# Patient Record
Sex: MALE | Race: WHITE | Employment: OTHER | URBAN - METROPOLITAN AREA
[De-identification: names, ages, dates, MRNs, and addresses within clinical notes are randomized per-mention and may not be internally consistent; named-entity substitution may affect disease eponyms.]

---

## 2017-04-03 ENCOUNTER — HOSPITAL ENCOUNTER (OUTPATIENT)
Dept: PREADMISSION TESTING | Age: 72
Discharge: HOME | End: 2017-04-03
Attending: UROLOGY | Admitting: UROLOGY

## 2017-04-03 VITALS
WEIGHT: 133 LBS | BODY MASS INDEX: 20.16 KG/M2 | OXYGEN SATURATION: 99 % | SYSTOLIC BLOOD PRESSURE: 178 MMHG | DIASTOLIC BLOOD PRESSURE: 84 MMHG | TEMPERATURE: 97.6 F | HEART RATE: 70 BPM | HEIGHT: 68 IN | RESPIRATION RATE: 16 BRPM

## 2017-04-03 LAB
ANION GAP SERPL CALCULATED.3IONS-SCNC: 18 MMOL/L (ref 4–16)
BUN BLDV-MCNC: 70 MG/DL (ref 6–23)
CALCIUM SERPL-MCNC: 9 MG/DL (ref 8.3–10.6)
CHLORIDE BLD-SCNC: 99 MMOL/L (ref 99–110)
CO2: 21 MMOL/L (ref 21–32)
CREAT SERPL-MCNC: 7.3 MG/DL (ref 0.9–1.3)
EKG ATRIAL RATE: 63 BPM
EKG DIAGNOSIS: NORMAL
EKG P AXIS: 68 DEGREES
EKG P-R INTERVAL: 164 MS
EKG Q-T INTERVAL: 458 MS
EKG QRS DURATION: 128 MS
EKG QTC CALCULATION (BAZETT): 468 MS
EKG R AXIS: -58 DEGREES
EKG T AXIS: 56 DEGREES
EKG VENTRICULAR RATE: 63 BPM
GFR AFRICAN AMERICAN: 9 ML/MIN/1.73M2
GFR NON-AFRICAN AMERICAN: 7 ML/MIN/1.73M2
GLUCOSE BLD-MCNC: 92 MG/DL (ref 70–140)
HCT VFR BLD CALC: 35.4 % (ref 42–52)
HEMOGLOBIN: 11.5 GM/DL (ref 13.5–18)
MCH RBC QN AUTO: 31.2 PG (ref 27–31)
MCHC RBC AUTO-ENTMCNC: 32.5 % (ref 32–36)
MCV RBC AUTO: 95.9 FL (ref 78–100)
PDW BLD-RTO: 13.4 % (ref 11.7–14.9)
PLATELET # BLD: 220 K/CU MM (ref 140–440)
PMV BLD AUTO: 9.4 FL (ref 7.5–11.1)
POTASSIUM SERPL-SCNC: 5.2 MMOL/L (ref 3.5–5.1)
RBC # BLD: 3.69 M/CU MM (ref 4.6–6.2)
SODIUM BLD-SCNC: 138 MMOL/L (ref 135–145)
WBC # BLD: 10.1 K/CU MM (ref 4–10.5)

## 2017-04-03 RX ORDER — FOLIC ACID/VIT B COMPLEX AND C 0.8 MG
1 TABLET ORAL DAILY
COMMUNITY

## 2017-04-03 RX ORDER — CIPROFLOXACIN 2 MG/ML
400 INJECTION, SOLUTION INTRAVENOUS ONCE
Status: CANCELLED | OUTPATIENT
Start: 2017-04-13

## 2017-04-03 ASSESSMENT — PAIN SCALES - GENERAL: PAINLEVEL_OUTOF10: 0

## 2017-04-07 LAB
CULTURE: NORMAL
ORGANISM: NORMAL
ORGANISM: NORMAL
REPORT STATUS: NORMAL
REQUEST PROBLEM: NORMAL
SPECIMEN: NORMAL
TOTAL COLONY COUNT: NORMAL

## 2017-04-13 ENCOUNTER — HOSPITAL ENCOUNTER (OUTPATIENT)
Dept: SURGERY | Age: 72
Discharge: HOME | End: 2017-05-12
Attending: UROLOGY | Admitting: UROLOGY

## 2017-04-24 VITALS — BODY MASS INDEX: 20.16 KG/M2 | WEIGHT: 133 LBS | HEIGHT: 68 IN

## 2017-04-24 RX ORDER — CIPROFLOXACIN 2 MG/ML
400 INJECTION, SOLUTION INTRAVENOUS ONCE
Status: CANCELLED | OUTPATIENT
Start: 2017-04-25

## 2017-04-25 ENCOUNTER — HOSPITAL ENCOUNTER (OUTPATIENT)
Dept: SURGERY | Age: 72
Discharge: HOME | End: 2017-05-24
Attending: UROLOGY | Admitting: UROLOGY

## 2017-05-22 ENCOUNTER — HOSPITAL ENCOUNTER (OUTPATIENT)
Dept: OTHER | Age: 72
Discharge: HOME | End: 2017-05-22
Attending: ANESTHESIOLOGY | Admitting: ANESTHESIOLOGY

## 2017-05-22 LAB
ANION GAP SERPL CALCULATED.3IONS-SCNC: 16 MMOL/L (ref 4–16)
BUN BLDV-MCNC: 13 MG/DL (ref 6–23)
CALCIUM SERPL-MCNC: 8.4 MG/DL (ref 8.3–10.6)
CHLORIDE BLD-SCNC: 92 MMOL/L (ref 99–110)
CO2: 28 MMOL/L (ref 21–32)
CREAT SERPL-MCNC: 1.7 MG/DL (ref 0.9–1.3)
GFR AFRICAN AMERICAN: 49 ML/MIN/1.73M2
GFR NON-AFRICAN AMERICAN: 40 ML/MIN/1.73M2
GLUCOSE BLD-MCNC: 162 MG/DL (ref 70–140)
HEMOGLOBIN: 10.6 GM/DL (ref 13.5–18)
POTASSIUM SERPL-SCNC: 2.9 MMOL/L (ref 3.5–5.1)
SODIUM BLD-SCNC: 136 MMOL/L (ref 135–145)

## 2017-10-09 ENCOUNTER — HOSPITAL ENCOUNTER (OUTPATIENT)
Dept: OTHER | Age: 72
Discharge: HOME | End: 2017-10-09
Attending: UROLOGY | Admitting: UROLOGY

## 2017-10-09 LAB — PSA ULTRASENSITIVE: 2.33 NG/ML (ref 0–4)

## 2018-09-14 ENCOUNTER — HOSPITAL ENCOUNTER (OUTPATIENT)
Dept: OTHER | Age: 73
Discharge: HOME | End: 2018-09-14
Attending: UROLOGY | Admitting: UROLOGY

## 2018-09-14 LAB — PROSTATE SPECIFIC ANTIGEN: 6.16 NG/ML (ref 0–4)

## 2019-02-05 ENCOUNTER — HOSPITAL ENCOUNTER (OUTPATIENT)
Dept: GENERAL RADIOLOGY | Age: 74
Discharge: HOME | End: 2019-02-05
Payer: MEDICARE

## 2019-02-05 DIAGNOSIS — R10.9 STOMACH ACHE: ICD-10-CM

## 2019-02-05 DIAGNOSIS — K57.31 DIVERTICULAR HEMORRHAGE: ICD-10-CM

## 2019-02-05 PROCEDURE — 74280 X-RAY XM COLON 2CNTRST STD: CPT

## 2019-02-15 ENCOUNTER — HOSPITAL ENCOUNTER (OUTPATIENT)
Age: 74
Setting detail: SPECIMEN
Discharge: HOME | End: 2019-02-15
Payer: MEDICARE

## 2019-02-15 PROCEDURE — 84153 ASSAY OF PSA TOTAL: CPT

## 2019-02-15 PROCEDURE — 84154 ASSAY OF PSA FREE: CPT

## 2019-02-18 LAB
PROSTATE SPECIFIC ANTIGEN FREE: 2.2
PROSTATE SPECIFIC ANTIGEN PERCENT FREE: 34
PROSTATE SPECIFIC ANTIGEN: 6.5

## 2019-05-01 ENCOUNTER — HOSPITAL ENCOUNTER (OUTPATIENT)
Age: 74
Setting detail: SPECIMEN
Discharge: HOME | End: 2019-05-01
Payer: MEDICARE

## 2019-05-01 LAB
ANION GAP SERPL CALCULATED.3IONS-SCNC: 15 MMOL/L (ref 4–16)
BUN BLDV-MCNC: 12 MG/DL (ref 6–23)
CALCIUM SERPL-MCNC: 8.4 MG/DL (ref 8.3–10.6)
CHLORIDE BLD-SCNC: 94 MMOL/L (ref 99–110)
CO2: 28 MMOL/L (ref 21–32)
CREAT SERPL-MCNC: 1.7 MG/DL (ref 0.9–1.3)
GFR AFRICAN AMERICAN: 48 ML/MIN/1.73M2
GFR NON-AFRICAN AMERICAN: 40 ML/MIN/1.73M2
GLUCOSE BLD-MCNC: 48 MG/DL (ref 70–99)
HEMOGLOBIN: 12.3 GM/DL (ref 13.5–18)
POTASSIUM SERPL-SCNC: 3.6 MMOL/L (ref 3.5–5.1)
SODIUM BLD-SCNC: 137 MMOL/L (ref 135–145)

## 2019-05-01 PROCEDURE — 85018 HEMOGLOBIN: CPT

## 2019-05-01 PROCEDURE — 80048 BASIC METABOLIC PNL TOTAL CA: CPT

## 2019-05-15 ENCOUNTER — HOSPITAL ENCOUNTER (OUTPATIENT)
Age: 74
Setting detail: SPECIMEN
Discharge: HOME | End: 2019-05-15

## 2019-05-15 LAB
ANION GAP SERPL CALCULATED.3IONS-SCNC: 13 MMOL/L (ref 4–16)
BUN BLDV-MCNC: 9 MG/DL (ref 6–23)
CALCIUM SERPL-MCNC: 8.2 MG/DL (ref 8.3–10.6)
CHLORIDE BLD-SCNC: 97 MMOL/L (ref 99–110)
CO2: 29 MMOL/L (ref 21–32)
CREAT SERPL-MCNC: 1.7 MG/DL (ref 0.9–1.3)
GFR AFRICAN AMERICAN: 48 ML/MIN/1.73M2
GFR NON-AFRICAN AMERICAN: 40 ML/MIN/1.73M2
GLUCOSE BLD-MCNC: 80 MG/DL (ref 70–99)
HEMOGLOBIN: 11.3 GM/DL (ref 13.5–18)
POTASSIUM SERPL-SCNC: 3.8 MMOL/L (ref 3.5–5.1)
SODIUM BLD-SCNC: 139 MMOL/L (ref 135–145)

## 2019-05-15 PROCEDURE — 85018 HEMOGLOBIN: CPT

## 2019-05-15 PROCEDURE — 80048 BASIC METABOLIC PNL TOTAL CA: CPT

## 2019-06-05 ENCOUNTER — HOSPITAL ENCOUNTER (OUTPATIENT)
Age: 74
Setting detail: SPECIMEN
Discharge: HOME | End: 2019-06-05
Payer: MEDICARE

## 2019-06-05 LAB
ANION GAP SERPL CALCULATED.3IONS-SCNC: 10 MMOL/L (ref 4–16)
BUN BLDV-MCNC: 9 MG/DL (ref 6–23)
CALCIUM SERPL-MCNC: 8.3 MG/DL (ref 8.3–10.6)
CHLORIDE BLD-SCNC: 99 MMOL/L (ref 99–110)
CO2: 31 MMOL/L (ref 21–32)
CREAT SERPL-MCNC: 1.6 MG/DL (ref 0.9–1.3)
GFR AFRICAN AMERICAN: 52 ML/MIN/1.73M2
GFR NON-AFRICAN AMERICAN: 43 ML/MIN/1.73M2
GLUCOSE BLD-MCNC: 68 MG/DL (ref 70–99)
HEMOGLOBIN: 11.8 GM/DL (ref 13.5–18)
POTASSIUM SERPL-SCNC: 4.3 MMOL/L (ref 3.5–5.1)
SODIUM BLD-SCNC: 140 MMOL/L (ref 135–145)

## 2019-06-05 PROCEDURE — 85018 HEMOGLOBIN: CPT

## 2019-06-05 PROCEDURE — 80048 BASIC METABOLIC PNL TOTAL CA: CPT

## 2019-07-03 ENCOUNTER — OFFICE VISIT (OUTPATIENT)
Dept: INFECTIOUS DISEASES | Age: 74
End: 2019-07-03
Payer: MEDICARE

## 2019-07-03 VITALS
BODY MASS INDEX: 21.01 KG/M2 | DIASTOLIC BLOOD PRESSURE: 67 MMHG | SYSTOLIC BLOOD PRESSURE: 137 MMHG | TEMPERATURE: 98.3 F | WEIGHT: 138.2 LBS | RESPIRATION RATE: 14 BRPM | HEART RATE: 79 BPM

## 2019-07-03 DIAGNOSIS — B96.29 URINARY TRACT INFECTION DUE TO EXTENDED-SPECTRUM BETA LACTAMASE (ESBL) PRODUCING ESCHERICHIA COLI: ICD-10-CM

## 2019-07-03 DIAGNOSIS — N39.0 URINARY TRACT INFECTION DUE TO EXTENDED-SPECTRUM BETA LACTAMASE (ESBL) PRODUCING ESCHERICHIA COLI: ICD-10-CM

## 2019-07-03 DIAGNOSIS — Z99.2 HEMODIALYSIS ACCESS SITE WITH MATURE FISTULA (HCC): ICD-10-CM

## 2019-07-03 DIAGNOSIS — Z16.12 URINARY TRACT INFECTION DUE TO EXTENDED-SPECTRUM BETA LACTAMASE (ESBL) PRODUCING ESCHERICHIA COLI: ICD-10-CM

## 2019-07-03 PROBLEM — N18.6 ESRD ON HEMODIALYSIS (HCC): Status: ACTIVE | Noted: 2019-07-03

## 2019-07-03 PROCEDURE — 99205 OFFICE O/P NEW HI 60 MIN: CPT | Performed by: INTERNAL MEDICINE

## 2019-07-03 RX ORDER — GRANULES FOR ORAL 3 G/1
3 POWDER ORAL
Qty: 3 EACH | Refills: 0 | Status: SHIPPED | OUTPATIENT
Start: 2019-07-03 | End: 2019-07-10

## 2019-07-03 RX ORDER — GRANULES FOR ORAL 3 G/1
3 POWDER ORAL
Qty: 3 EACH | Refills: 0 | Status: SHIPPED | OUTPATIENT
Start: 2019-07-03 | End: 2019-07-03

## 2019-07-07 PROBLEM — Z99.2 ESRD ON HEMODIALYSIS (HCC): Status: RESOLVED | Noted: 2019-07-03 | Resolved: 2019-07-07

## 2019-07-07 PROBLEM — N18.6 ESRD ON HEMODIALYSIS (HCC): Status: RESOLVED | Noted: 2019-07-03 | Resolved: 2019-07-07

## 2019-07-07 PROBLEM — Z99.2 HEMODIALYSIS ACCESS SITE WITH MATURE FISTULA (HCC): Status: ACTIVE | Noted: 2019-07-07

## 2019-07-07 ASSESSMENT — ENCOUNTER SYMPTOMS
ALLERGIC/IMMUNOLOGIC NEGATIVE: 1
RESPIRATORY NEGATIVE: 1
GASTROINTESTINAL NEGATIVE: 1
EYES NEGATIVE: 1

## 2019-07-07 NOTE — PROGRESS NOTES
soft without significant tenderness, masses, organomegaly or guarding. EXT:left upper arm AV fistula peripheral pulses normal, no pedal edema, no clubbing or cyanosis  NEURO: alert, oriented, normal speech, no focal findings or movement disorder noted  Skin: well hydrated, no lesions, surgical site examined  Wounds: none  Labs:   WBC   Date Value Ref Range Status   08/31/2017 9.1 4.0 - 10.5 K/CU MM Final   04/03/2017 10.1 4.0 - 10.5 K/CU MM Final   02/14/2017 8.6 4.0 - 10.5 K/CU MM Final     CREATININE   Date Value Ref Range Status   06/05/2019 1.6 (H) 0.9 - 1.3 MG/DL Final   05/15/2019 1.7 (H) 0.9 - 1.3 MG/DL Final   05/01/2019 1.7 (H) 0.9 - 1.3 MG/DL Final       Cultures:  Culture   Date Value Ref Range Status   04/03/2017 PROVIDENCIA (PROTEUS) RETTGERI HEAVY GROWTH  Final   04/03/2017       ESCHERICHIA COLI (ESBL) HEAVY GROWTH, RESIST DUE TO EXTENDED SPECTRUM BETA-LACTAMASE (ESBL), THIS ISOLATE DEMONSTRATES MULTI DRUG RESISTANCE TO AT LEAST 3 DRUG CLASSES.   04/03/2017 (NOTE)     04/03/2017       CALLED TO  OFFICE-[facility] ON 04/07/2017 @1039 BY   04/03/2017 D. RA MLT         Imaging Studies:   none      Electronicallysigned by Zahida Patterson MD on 7/7/19 at 7:31 PM

## 2019-07-12 ENCOUNTER — TELEPHONE (OUTPATIENT)
Dept: INFECTIOUS DISEASES | Age: 74
End: 2019-07-12

## 2019-07-12 DIAGNOSIS — N39.0 URINARY TRACT INFECTION DUE TO EXTENDED-SPECTRUM BETA LACTAMASE (ESBL) PRODUCING ESCHERICHIA COLI: Primary | ICD-10-CM

## 2019-07-12 DIAGNOSIS — B96.29 URINARY TRACT INFECTION DUE TO EXTENDED-SPECTRUM BETA LACTAMASE (ESBL) PRODUCING ESCHERICHIA COLI: Primary | ICD-10-CM

## 2019-07-12 DIAGNOSIS — Z16.12 URINARY TRACT INFECTION DUE TO EXTENDED-SPECTRUM BETA LACTAMASE (ESBL) PRODUCING ESCHERICHIA COLI: Primary | ICD-10-CM

## 2019-07-12 NOTE — TELEPHONE ENCOUNTER
He could use Augmentin orally for 2 weeks if this fails then he will need a tunneled PICC line for IV ertapenem once a day for 2 weeks. I put in a prescription. Unclear if [facility] preferred [facility]. Could you find out?

## 2019-07-16 RX ORDER — AMOXICILLIN AND CLAVULANATE POTASSIUM 875; 125 MG/1; MG/1
1 TABLET, FILM COATED ORAL 2 TIMES DAILY
Qty: 28 TABLET | Refills: 0 | Status: SHIPPED | OUTPATIENT
Start: 2019-07-16 | End: 2019-07-30

## 2019-08-07 ENCOUNTER — OFFICE VISIT (OUTPATIENT)
Dept: INFECTIOUS DISEASES | Age: 74
End: 2019-08-07
Payer: MEDICARE

## 2019-08-07 VITALS
OXYGEN SATURATION: 99 % | BODY MASS INDEX: 20.83 KG/M2 | HEART RATE: 71 BPM | DIASTOLIC BLOOD PRESSURE: 70 MMHG | SYSTOLIC BLOOD PRESSURE: 128 MMHG | WEIGHT: 137 LBS

## 2019-08-07 DIAGNOSIS — B96.29 URINARY TRACT INFECTION DUE TO EXTENDED-SPECTRUM BETA LACTAMASE (ESBL) PRODUCING ESCHERICHIA COLI: Primary | ICD-10-CM

## 2019-08-07 DIAGNOSIS — N39.0 URINARY TRACT INFECTION DUE TO EXTENDED-SPECTRUM BETA LACTAMASE (ESBL) PRODUCING ESCHERICHIA COLI: Primary | ICD-10-CM

## 2019-08-07 DIAGNOSIS — Z16.12 URINARY TRACT INFECTION DUE TO EXTENDED-SPECTRUM BETA LACTAMASE (ESBL) PRODUCING ESCHERICHIA COLI: Primary | ICD-10-CM

## 2019-08-07 DIAGNOSIS — N18.4 CHRONIC KIDNEY DISEASE (CKD), STAGE IV (SEVERE) (HCC): ICD-10-CM

## 2019-08-07 PROCEDURE — 99214 OFFICE O/P EST MOD 30 MIN: CPT | Performed by: INTERNAL MEDICINE

## 2019-08-13 ASSESSMENT — ENCOUNTER SYMPTOMS
RESPIRATORY NEGATIVE: 1
EYES NEGATIVE: 1
GASTROINTESTINAL NEGATIVE: 1
ALLERGIC/IMMUNOLOGIC NEGATIVE: 1

## 2019-08-13 NOTE — PROGRESS NOTES
Last attempt to quit: 4/3/1992     Years since quittin.3    Smokeless tobacco: Never Used   Substance and Sexual Activity    Alcohol use: No     Comment: quit 2.5yrs ago--used to drink 3-4 beers daily    Drug use: No    Sexual activity: Not on file   Lifestyle    Physical activity:     Days per week: Not on file     Minutes per session: Not on file    Stress: Not on file   Relationships    Social connections:     Talks on phone: Not on file     Gets together: Not on file     Attends [employer] service: Not on file     Active member of club or organization: Not on file     Attends meetings of clubs or organizations: Not on file     Relationship status: Not on file    Intimate partner violence:     Fear of current or ex partner: Not on file     Emotionally abused: Not on file     Physically abused: Not on file     Forced sexual activity: Not on file   Other Topics Concern    Not on file   Social History Narrative    Not on file       Family History   Problem Relation Age of Onset    Diabetes Mother     Diabetes Brother     Heart Disease Brother        Vital Signs:  Vitals:    19 1429   BP: 128/70   Site: Right Upper Arm   Position: Sitting   Cuff Size: Large Adult   Pulse: 71   SpO2: 99%   Weight: 137 lb (62.1 kg)        Wt Readings from Last 3 Encounters:   19 137 lb (62.1 kg)   19 138 lb 3.2 oz (62.7 kg)   17 133 lb (60.3 kg)        Physical Exam:   Gen: alert and NAD  HEENT: sclera clear, pupils equal and reactive, extra ocular muscles intact, oropharynx clear, mucus membranes moist, tympanic membranes clear bilaterally, no cervical lymphadenopathy noted and neck supple  Neck: supple, no significant adenopathy  Chest: clear to auscultation, no wheezes, rales or rhonchi, symmetric air entry  Heart: regular rate and rhythm, no murmurs  ABD: abdomen is soft without significant tenderness, masses, organomegaly or guarding.   EXT:left upper arm AV fistula peripheral pulses normal,

## 2019-08-19 ENCOUNTER — TELEPHONE (OUTPATIENT)
Dept: INFECTIOUS DISEASES | Age: 74
End: 2019-08-19

## 2019-08-21 ENCOUNTER — OFFICE VISIT (OUTPATIENT)
Dept: INFECTIOUS DISEASES | Age: 74
End: 2019-08-21
Payer: MEDICARE

## 2019-08-21 VITALS
OXYGEN SATURATION: 99 % | DIASTOLIC BLOOD PRESSURE: 68 MMHG | HEART RATE: 76 BPM | WEIGHT: 136 LBS | BODY MASS INDEX: 20.68 KG/M2 | SYSTOLIC BLOOD PRESSURE: 140 MMHG

## 2019-08-21 DIAGNOSIS — B96.29 URINARY TRACT INFECTION DUE TO EXTENDED-SPECTRUM BETA LACTAMASE (ESBL) PRODUCING ESCHERICHIA COLI: Primary | ICD-10-CM

## 2019-08-21 DIAGNOSIS — Z16.12 URINARY TRACT INFECTION DUE TO EXTENDED-SPECTRUM BETA LACTAMASE (ESBL) PRODUCING ESCHERICHIA COLI: Primary | ICD-10-CM

## 2019-08-21 DIAGNOSIS — Z79.2 RECEIVING INTRAVENOUS ANTIBIOTIC TREATMENT AS OUTPATIENT: ICD-10-CM

## 2019-08-21 DIAGNOSIS — Z22.39 ESBL E. COLI CARRIER: ICD-10-CM

## 2019-08-21 DIAGNOSIS — N39.0 URINARY TRACT INFECTION DUE TO EXTENDED-SPECTRUM BETA LACTAMASE (ESBL) PRODUCING ESCHERICHIA COLI: Primary | ICD-10-CM

## 2019-08-21 PROCEDURE — 99214 OFFICE O/P EST MOD 30 MIN: CPT | Performed by: INTERNAL MEDICINE

## 2019-08-21 RX ORDER — NITROFURANTOIN 25; 75 MG/1; MG/1
CAPSULE ORAL
Refills: 0 | COMMUNITY
Start: 2019-08-09 | End: 2021-06-17

## 2019-08-21 ASSESSMENT — ENCOUNTER SYMPTOMS
GASTROINTESTINAL NEGATIVE: 1
EYES NEGATIVE: 1
RESPIRATORY NEGATIVE: 1
ALLERGIC/IMMUNOLOGIC NEGATIVE: 1

## 2019-08-21 NOTE — PROGRESS NOTES
8/21/2019         Referring Physician: No ref. provider found  Primary Care Physician: Austin Martinez MD    Impression/Plan:   Diagnosis Orders   1. Urinary tract infection due to extended-spectrum beta lactamase (ESBL) producing Escherichia coli  ertapenem (INVANZ) infusion   2. ESBL E. coli carrier  ertapenem (INVANZ) infusion    URINALYSIS WITH MICROSCOPIC    URINE CULTURE   3. Receiving intravenous antibiotic treatment as outpatient  ertapenem (INVANZ) infusion    CBC Auto Differential    COMPREHENSIVE METABOLIC PANEL       Discussion:  Urinary tract infection due to extended-spectrum beta lactamase (ESBL) producing Escherichia coli  Ertapenem for 10 days. UA and urine culture at the completion of treatment   See in 2 weeks      No follow-ups on file. History: Jay López is a 72 y.o.  male with CKD 4 on HD presenting today for follow-up asymptomatic ESBL E coli bacteriuria. He is being planned for TURP and the urology service would like this urine to test negative prior to the procedure. Cost of fosfomycin was prohibitive hence he was treated with coamoxiclav. He denies dysuria, abdominal pain, fever, chills, or flank pain. Repeat urine culture remains positive for ESBL E coli. Review of Systems   Constitutional: Negative. HENT: Negative. Eyes: Negative. Respiratory: Negative. Cardiovascular: Negative. Gastrointestinal: Negative. Endocrine: Negative. Musculoskeletal: Negative. Skin: Negative. Allergic/Immunologic: Negative. Neurological: Negative. Hematological: Negative. Psychiatric/Behavioral: Negative.         No Known Allergies    Patient Active Problem List   Diagnosis    Chronic kidney disease (CKD), stage IV (severe) ([facility] [address]    Right inguinal hernia    Urinary tract infection due to extended-spectrum beta lactamase (ESBL) producing Escherichia coli    Hemodialysis access site with mature fistula [facility])       Current Outpatient Medications Medication Sig Dispense Refill    nitrofurantoin, macrocrystal-monohydrate, (MACROBID) 100 MG capsule TK 1 C PO Q 12 H WF  0    ertapenem (INVANZ) infusion Infuse 500 mg intravenously every 24 hours for 10 days Compound per protocol. 5000 mg 0    B Complex-C-Folic Acid (SHEA-ABHISHEK) TABS Take 1 tablet by mouth daily      RENVELA 800 MG tablet 2 Tabs  PO TID WITH MEALS  2    carvedilol (COREG) 3.125 MG tablet Take 3.125 mg by mouth daily   0    finasteride (PROSCAR) 5 MG tablet Take 5 mg by mouth daily   0    nitroGLYCERIN (NITRODUR) 0.2 MG/HR Place 1 patch onto the skin every other day   0    isosorbide mononitrate (IMDUR) 30 MG extended release tablet 30 mg daily   0     No current facility-administered medications for this visit.         Past Medical History:   Diagnosis Date    Abnormal urine     abn urine culture 4/3/2017- OR for 4/13/2017 cancelled-rescheduled for 4/25/2017- ( on 4/24/2017 pt states finishes antibiotic today and had repeat urine culture at office last week)    Chronic kidney disease, stage 4, severely decreased GFR ([facility] [address] 09/2016    Dialysis on Mon-Wed-Fri    follows with Dr Dottie Pro Nunez catheter in place     Has had in place since 9/29/16    Hearing deficit     both ears --no hearing aids    Hemodialysis patient [facility])     Sees Dr Chen Setting    Dialysis on Mon-Wed-Fri    History of blood transfusion     Hypertension     Inguinal hernia     Missing teeth, acquired     upper and lower  some broken off now    Wears glasses     for reading       Past Surgical History:   Procedure Laterality Date    DIALYSIS FISTULA CREATION Left 11/29/2016    Left upper arm    HERNIA REPAIR Right 11/29/2016    INGUINAL HERNIA    PROSTATE SURGERY  04/2017       Social History     Socioeconomic History    Marital status: Single     Spouse name: Not on file    Number of children: Not on file    Years of education: Not on file    Highest education level: Not on file   Occupational History    Not

## 2019-08-21 NOTE — ASSESSMENT & PLAN NOTE
Ertapenem for 10 days.  UA and urine culture at the completion of treatment   See in 2 weeks   Discussed with Dr. Jaye Wilburn, doesn't want PICC rather wants IJ CVC

## 2019-08-26 ENCOUNTER — TELEPHONE (OUTPATIENT)
Dept: INFECTIOUS DISEASES | Age: 74
End: 2019-08-26

## 2019-08-26 DIAGNOSIS — Z79.2 RECEIVING INTRAVENOUS ANTIBIOTIC TREATMENT AT HOME: Primary | ICD-10-CM

## 2019-09-05 ENCOUNTER — HOSPITAL ENCOUNTER (OUTPATIENT)
Age: 74
Setting detail: SPECIMEN
Discharge: HOME | End: 2019-09-05
Payer: MEDICARE

## 2019-09-05 LAB
ALBUMIN SERPL-MCNC: 4.2 GM/DL (ref 3.4–5)
ALP BLD-CCNC: 102 IU/L (ref 40–128)
ALT SERPL-CCNC: 10 U/L (ref 10–40)
ANION GAP SERPL CALCULATED.3IONS-SCNC: 14 MMOL/L (ref 4–16)
AST SERPL-CCNC: 17 IU/L (ref 15–37)
BACTERIA: NEGATIVE /HPF
BASOPHILS ABSOLUTE: 0.1 K/CU MM
BASOPHILS RELATIVE PERCENT: 0.9 % (ref 0–1)
BILIRUB SERPL-MCNC: 0.3 MG/DL (ref 0–1)
BILIRUBIN URINE: NEGATIVE MG/DL
BLOOD, URINE: NEGATIVE
BUN BLDV-MCNC: 32 MG/DL (ref 6–23)
CALCIUM SERPL-MCNC: 9.8 MG/DL (ref 8.3–10.6)
CHLORIDE BLD-SCNC: 95 MMOL/L (ref 99–110)
CLARITY: CLEAR
CO2: 31 MMOL/L (ref 21–32)
COLOR: YELLOW
CREAT SERPL-MCNC: 5 MG/DL (ref 0.9–1.3)
DIFFERENTIAL TYPE: ABNORMAL
EOSINOPHILS ABSOLUTE: 0.3 K/CU MM
EOSINOPHILS RELATIVE PERCENT: 3.1 % (ref 0–3)
GFR AFRICAN AMERICAN: 14 ML/MIN/1.73M2
GFR NON-AFRICAN AMERICAN: 11 ML/MIN/1.73M2
GLUCOSE BLD-MCNC: 98 MG/DL (ref 70–99)
GLUCOSE, URINE: NEGATIVE MG/DL
HCT VFR BLD CALC: 37 % (ref 42–52)
HEMOGLOBIN: 12.1 GM/DL (ref 13.5–18)
IMMATURE NEUTROPHIL %: 0.3 % (ref 0–0.43)
KETONES, URINE: NEGATIVE MG/DL
LEUKOCYTE ESTERASE, URINE: NEGATIVE
LYMPHOCYTES ABSOLUTE: 2.3 K/CU MM
LYMPHOCYTES RELATIVE PERCENT: 26.9 % (ref 24–44)
MCH RBC QN AUTO: 30.3 PG (ref 27–31)
MCHC RBC AUTO-ENTMCNC: 32.7 % (ref 32–36)
MCV RBC AUTO: 92.5 FL (ref 78–100)
MONOCYTES ABSOLUTE: 0.6 K/CU MM
MONOCYTES RELATIVE PERCENT: 7.2 % (ref 0–4)
NITRITE URINE, QUANTITATIVE: NEGATIVE
NUCLEATED RBC %: 0 %
PDW BLD-RTO: 13.2 % (ref 11.7–14.9)
PH, URINE: 9 (ref 5–8)
PLATELET # BLD: 240 K/CU MM (ref 140–440)
PMV BLD AUTO: 10.2 FL (ref 7.5–11.1)
POTASSIUM SERPL-SCNC: 5.1 MMOL/L (ref 3.5–5.1)
PROTEIN UA: 100 MG/DL
RBC # BLD: 4 M/CU MM (ref 4.6–6.2)
RBC URINE: <1 /HPF (ref 0–3)
SEGMENTED NEUTROPHILS ABSOLUTE COUNT: 5.3 K/CU MM
SEGMENTED NEUTROPHILS RELATIVE PERCENT: 61.6 % (ref 36–66)
SODIUM BLD-SCNC: 140 MMOL/L (ref 135–145)
SPECIFIC GRAVITY UA: 1.01 (ref 1–1.03)
SQUAMOUS EPITHELIAL: 1 /HPF
TOTAL IMMATURE NEUTOROPHIL: 0.03 K/CU MM
TOTAL NUCLEATED RBC: 0 K/CU MM
TOTAL PROTEIN: 6.8 GM/DL (ref 6.4–8.2)
TRICHOMONAS: ABNORMAL /HPF
UROBILINOGEN, URINE: NORMAL MG/DL (ref 0.2–1)
WBC # BLD: 8.7 K/CU MM (ref 4–10.5)
WBC UA: 1 /HPF (ref 0–2)

## 2019-09-05 PROCEDURE — 80053 COMPREHEN METABOLIC PANEL: CPT

## 2019-09-05 PROCEDURE — 81001 URINALYSIS AUTO W/SCOPE: CPT

## 2019-09-05 PROCEDURE — 85025 COMPLETE CBC W/AUTO DIFF WBC: CPT

## 2019-09-10 ENCOUNTER — HOSPITAL ENCOUNTER (OUTPATIENT)
Dept: INTERVENTIONAL RADIOLOGY/VASCULAR | Age: 74
Discharge: HOME | End: 2019-09-10

## 2019-09-11 ENCOUNTER — OFFICE VISIT (OUTPATIENT)
Dept: INFECTIOUS DISEASES | Age: 74
End: 2019-09-11
Payer: MEDICARE

## 2019-09-11 VITALS
HEART RATE: 73 BPM | DIASTOLIC BLOOD PRESSURE: 66 MMHG | TEMPERATURE: 98.3 F | SYSTOLIC BLOOD PRESSURE: 152 MMHG | BODY MASS INDEX: 20.92 KG/M2 | RESPIRATION RATE: 12 BRPM | WEIGHT: 137.6 LBS

## 2019-09-11 DIAGNOSIS — B96.29 URINARY TRACT INFECTION DUE TO EXTENDED-SPECTRUM BETA LACTAMASE (ESBL) PRODUCING ESCHERICHIA COLI: ICD-10-CM

## 2019-09-11 DIAGNOSIS — N39.0 URINARY TRACT INFECTION DUE TO EXTENDED-SPECTRUM BETA LACTAMASE (ESBL) PRODUCING ESCHERICHIA COLI: ICD-10-CM

## 2019-09-11 DIAGNOSIS — Z22.39 ESBL E. COLI CARRIER: Primary | ICD-10-CM

## 2019-09-11 DIAGNOSIS — Z16.12 URINARY TRACT INFECTION DUE TO EXTENDED-SPECTRUM BETA LACTAMASE (ESBL) PRODUCING ESCHERICHIA COLI: ICD-10-CM

## 2019-09-11 PROCEDURE — 1036F TOBACCO NON-USER: CPT | Performed by: INTERNAL MEDICINE

## 2019-09-11 PROCEDURE — 1123F ACP DISCUSS/DSCN MKR DOCD: CPT | Performed by: INTERNAL MEDICINE

## 2019-09-11 PROCEDURE — G8427 DOCREV CUR MEDS BY ELIG CLIN: HCPCS | Performed by: INTERNAL MEDICINE

## 2019-09-11 PROCEDURE — 99214 OFFICE O/P EST MOD 30 MIN: CPT | Performed by: INTERNAL MEDICINE

## 2019-09-11 PROCEDURE — G8420 CALC BMI NORM PARAMETERS: HCPCS | Performed by: INTERNAL MEDICINE

## 2019-09-11 PROCEDURE — 3017F COLORECTAL CA SCREEN DOC REV: CPT | Performed by: INTERNAL MEDICINE

## 2019-09-11 PROCEDURE — 4040F PNEUMOC VAC/ADMIN/RCVD: CPT | Performed by: INTERNAL MEDICINE

## 2019-09-11 ASSESSMENT — ENCOUNTER SYMPTOMS
GASTROINTESTINAL NEGATIVE: 1
ALLERGIC/IMMUNOLOGIC NEGATIVE: 1
RESPIRATORY NEGATIVE: 1
EYES NEGATIVE: 1

## 2019-09-11 NOTE — PROGRESS NOTES
 carvedilol (COREG) 3.125 MG tablet Take 3.125 mg by mouth daily   0    finasteride (PROSCAR) 5 MG tablet Take 5 mg by mouth daily   0    nitroGLYCERIN (NITRODUR) 0.2 MG/HR Place 1 patch onto the skin every other day   0    isosorbide mononitrate (IMDUR) 30 MG extended release tablet 30 mg daily   0     No current facility-administered medications for this visit.         Past Medical History:   Diagnosis Date    Abnormal urine     abn urine culture 4/3/2017- OR for 2017 cancelled-rescheduled for 2017- ( on 2017 pt states finishes antibiotic today and had repeat urine culture at office last week)    Chronic kidney disease, stage 4, severely decreased GFR ([facility] [address] 2016    Dialysis on Mon-    follows with Dr Georgina Navarro Nunez catheter in place     Has had in place since 16    Hearing deficit     both ears --no hearing aids    Hemodialysis patient [facility])     Sees Dr Jaime Stewart    Dialysis on     History of blood transfusion     Hypertension     Inguinal hernia     Missing teeth, acquired     upper and lower  some broken off now    Wears glasses     for reading       Past Surgical History:   Procedure Laterality Date    DIALYSIS FISTULA CREATION Left 2016    Left upper arm    HERNIA REPAIR Right 2016    INGUINAL HERNIA    PROSTATE SURGERY  2017       Social History     Socioeconomic History    Marital status: Single     Spouse name: Not on file    Number of children: Not on file    Years of education: Not on file    Highest education level: Not on file   Occupational History    Not on file   Social Needs    Financial resource strain: Not on file    Food insecurity:     Worry: Not on file     Inability: Not on file    Transportation needs:     Medical: Not on file     Non-medical: Not on file   Tobacco Use    Smoking status: Former Smoker     Years: 30.00     Types: Cigarettes     Last attempt to quit: 4/3/1992     Years since quittin.4   

## 2021-05-27 NOTE — PROGRESS NOTES
Patient Name:  Puma Fowler  Patient :  1945  Patient MRN:  [id number]     Primary Oncologist: Trenton Morel MD  Referring Provider: Caesar Fine MD     Date of Service: 2021      Reason for Consult:  Melanoma     Chief Complaint:    Chief Complaint   Patient presents with    New Patient      Patient Active Problem List:     Chronic kidney disease (CKD), stage IV (severe)      Right inguinal hernia     Urinary tract infection due to extended-spectrum beta lactamase (ESBL) producing Escherichia coli     Hemodialysis access site with mature fistula      HPI:   Davida Park is a pleasant 73-year-old male patient who was referred for evaluation of melanoma. He went to Dr Rebecca Ortega for evaluation of mole to right back and had biopsy on May 19, 2021       He has red hair. He was referred to Dr Mikael Diego for wide excision and sentinel lymph node biopsy. We went over NCCN guideline. He has pathological stage IIA. We discussed about observation vs clinical trial. I will discuss with clinical research nurse. I discussed about surveillance. No routine labs or imaging study were recommended. I recommend to follow up with me after surgery. We discussed about using of sunscreen. He has ESRD and is on HD TIW.     Past Medical History:  Past Medical History:   Diagnosis Date    Abnormal urine     abn urine culture 4/3/2017- OR for 2017 cancelled-rescheduled for 2017- ( on 2017 pt states finishes antibiotic today and had repeat urine culture at office last week)    Chronic kidney disease, stage 4, severely decreased GFR ([address] 2016    Dialysis on Mon-Wed-Fri    follows with Dr Issa Flores Nunez catheter in place     Has had in place since 16    Hearing deficit     both ears --no hearing aids    Hemodialysis patient [facility])     Sees Dr Mu Matos    Dialysis on Mon-Wed-Fri    History of blood transfusion     Hypertension     Inguinal hernia     Missing teeth, acquired     upper and lower some broken off now    Wears glasses     for reading      Past Surgery History:     DIALYSIS FISTULA CREATION 11/29/2016 Left upper arm   HERNIA REPAIR Right 11/29/2016 INGUINAL HERNIA   PROSTATE SURGERY 04/2017 for BPH  Colonoscopy in 2019. Social History:  He smoked 2 PPD for 19 years and quit in 1995. Denied EtOH or illicit drug use. He has 3 children. Family History  No cancer in the family. No Known Allergies    Current Outpatient Medications on File Prior to Visit   Medication Sig Dispense Refill    tamsulosin (FLOMAX) 0.4 MG capsule TAKE 1 CAPSULE BY MOUTH EVERY DAY      polyethylene glycol (GLYCOLAX) 17 GM/SCOOP powder MIX 1 CAPFUL IN 8 OZ OF LIQUID AND DRINK BY MOUTH ONCE DAILY AS DIRECTED      pantoprazole (PROTONIX) 40 MG tablet TAKE 1 TABLET BY MOUTH EVERY DAY      carvedilol (COREG) 3.125 MG tablet Take 1 tablet by mouth daily 180 tablet 3    nitrofurantoin, macrocrystal-monohydrate, (MACROBID) 100 MG capsule TK 1 C PO Q 12 H WF  0    B Complex-C-Folic Acid (SHEA-ABHISHEK) TABS Take 1 tablet by mouth daily      RENVELA 800 MG tablet 2 Tabs  PO TID WITH MEALS  2    finasteride (PROSCAR) 5 MG tablet Take 5 mg by mouth daily   0    nitroGLYCERIN (NITRODUR) 0.2 MG/HR Place 1 patch onto the skin every other day   0    isosorbide mononitrate (IMDUR) 30 MG extended release tablet 30 mg daily   0     No current facility-administered medications on file prior to visit. Review of Systems:    Constitutional:  No weight loss, No fever, No chills, No night sweats. Energy level good. Eyes:  No diplopia, No transient or permanent loss of vision, No scotomata. ENT / Mouth:  No epistaxis, No dysphagia, No hoarseness, No oral ulcers, No gingival bleeding. No sore throat, No postnasal drip, No nasal drip, No mouth pain, No sinus pain, No tinnitus, Normal hearing.   Cardiovascular:  No chest pain, No palpitations, No syncope, No upper extremity edema, No lower extremity edema, No calf discomfort. Respiratory:  No cough. No hemoptysis, No pleurisy, No wheezing, No dyspnea. Gastrointestinal:  No abdominal pain, No abdominal cramping, No nausea, No vomiting, No constipation, No diarrhea, No hematochezia, No melena, No jaundice, No dyspepsia, No dysphagia. Urinary:  No dysuria, No hematuria, No urinary incontinence. Musculoskeletal:  No muscle pain, No swollen joints, No joint redness, No bone pain, No spine tenderness. Skin:  No rash, No nodules, No pruritus, s/p biopsy of skin of right back. Neurologic:  No confusion, No seizures, No syncope, No tremor, No speech change, No headache, No hiccups, No abnormal gait, No sensory changes, No weakness. Psychiatric:  No depression, No anxiety, Concentration normal.  Endocrine:  No polyuria, No polydipsia, No hot flashes, No thyroid symptoms. Hematologic:  No epistaxis, No gingival bleeding, No petechiae, No ecchymosis. Lymphatic:  No lymphadenopathy, No lymphedema. Allergy / Immunologic:  No eczema, No frequent mucous infections, No frequent respiratory infections, No recurrent urticarial, No frequent skin infections.      Vital Signs: BP (!) 152/73 (Site: Right Upper Arm, Position: Sitting, Cuff Size: Large Adult)   Pulse 72   Temp 97.1 °F (36.2 °C) (Infrared)   Resp 16   Ht 5' 8\" (1.727 m)   Wt 140 lb 3.2 oz (63.6 kg)   SpO2 97%   BMI 21.32 kg/m²      Physical Exam:  CONSTITUTIONAL: awake, alert, cooperative, no apparent distress   EYES: pupils equal, round and reactive to light, sclera clear and conjunctiva normal  ENT: Normocephalic, without obvious abnormality, atraumatic  NECK: supple, symmetrical, no jugular venous distension and no carotid bruits   HEMATOLOGIC/LYMPHATIC: no cervical, supraclavicular or axillary lymphadenopathy   LUNGS: no increased work of breathing and clear to auscultation   CARDIOVASCULAR: regular rate and rhythm, normal S1 and S2, no murmur noted  ABDOMEN: normal bowel sounds x 4, soft, non-distended, non-tender, no masses palpated, no hepatosplenomegaly   MUSCULOSKELETAL: full range of motion noted, tone is normal  NEUROLOGIC: awake, alert, oriented to name, place and time. Motor skills grossly intact. SKIN: No jaundice. Healing scar from recent biopsy to right back  EXTREMITIES: no LE edema     Labs:  Hematology:  Lab Results   Component Value Date    WBC 8.7 09/05/2019    RBC 4.00 (L) 09/05/2019    HGB 12.1 (L) 09/05/2019    HCT 37.0 (L) 09/05/2019    MCV 92.5 09/05/2019    MCH 30.3 09/05/2019    MCHC 32.7 09/05/2019    RDW 13.2 09/05/2019     09/05/2019    MPV 10.2 09/05/2019    SEGSPCT 61.6 09/05/2019    EOSRELPCT 3.1 (H) 09/05/2019    BASOPCT 0.9 09/05/2019    LYMPHOPCT 26.9 09/05/2019    MONOPCT 7.2 (H) 09/05/2019    SEGSABS 5.3 09/05/2019    EOSABS 0.3 09/05/2019    BASOSABS 0.1 09/05/2019    LYMPHSABS 2.3 09/05/2019    MONOSABS 0.6 09/05/2019    DIFFTYPE AUTOMATED DIFFERENTIAL 09/05/2019     Chemistry:  Lab Results   Component Value Date     09/05/2019    K 5.1 09/05/2019    CL 95 (L) 09/05/2019    CO2 31 09/05/2019    BUN 32 (H) 09/05/2019    CREATININE 5.0 (H) 09/05/2019    GLUCOSE 98 09/05/2019    CALCIUM 9.8 09/05/2019    PROT 6.8 09/05/2019    LABALBU 4.2 09/05/2019    BILITOT 0.3 09/05/2019    ALKPHOS 102 09/05/2019    AST 17 09/05/2019    ALT 10 09/05/2019    LABGLOM 11 (L) 09/05/2019    GFRAA 14 (L) 09/05/2019     Immunology:  Lab Results   Component Value Date    PROT 6.8 09/05/2019     Coagulation Panel:  Lab Results   Component Value Date    PROTIME 10.8 08/31/2017    INR 0.95 08/31/2017    APTT 29.5 08/31/2017    DDIMER 223 04/15/2011     Tumor Markers:  Lab Results   Component Value Date    PSA 6.5 (H) 02/15/2019        Observations:  PHQ-9 Total Score: 0 (6/4/2021  1:53 PM)       Assessment & Plan:  1. 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 He is scheduled for wide excision and sentinel lymph node sampling.   We discussed about

## 2021-06-04 ENCOUNTER — HOSPITAL ENCOUNTER (OUTPATIENT)
Dept: INFUSION THERAPY | Age: 76
Discharge: HOME | End: 2021-06-04
Payer: MEDICARE

## 2021-06-04 ENCOUNTER — INITIAL CONSULT (OUTPATIENT)
Dept: ONCOLOGY | Age: 76
End: 2021-06-04
Payer: MEDICARE

## 2021-06-04 VITALS
HEART RATE: 72 BPM | OXYGEN SATURATION: 97 % | BODY MASS INDEX: 21.25 KG/M2 | RESPIRATION RATE: 16 BRPM | HEIGHT: 68 IN | TEMPERATURE: 97.1 F | DIASTOLIC BLOOD PRESSURE: 73 MMHG | WEIGHT: 140.2 LBS | SYSTOLIC BLOOD PRESSURE: 152 MMHG

## 2021-06-04 DIAGNOSIS — C43.59 MALIGNANT MELANOMA OF TORSO EXCLUDING BREAST (HCC): Primary | ICD-10-CM

## 2021-06-04 PROCEDURE — 99205 OFFICE O/P NEW HI 60 MIN: CPT | Performed by: INTERNAL MEDICINE

## 2021-06-04 PROCEDURE — 1123F ACP DISCUSS/DSCN MKR DOCD: CPT | Performed by: INTERNAL MEDICINE

## 2021-06-04 PROCEDURE — 99202 OFFICE O/P NEW SF 15 MIN: CPT

## 2021-06-04 PROCEDURE — 1036F TOBACCO NON-USER: CPT | Performed by: INTERNAL MEDICINE

## 2021-06-04 PROCEDURE — 3017F COLORECTAL CA SCREEN DOC REV: CPT | Performed by: INTERNAL MEDICINE

## 2021-06-04 PROCEDURE — 4040F PNEUMOC VAC/ADMIN/RCVD: CPT | Performed by: INTERNAL MEDICINE

## 2021-06-04 PROCEDURE — G8420 CALC BMI NORM PARAMETERS: HCPCS | Performed by: INTERNAL MEDICINE

## 2021-06-04 PROCEDURE — G8427 DOCREV CUR MEDS BY ELIG CLIN: HCPCS | Performed by: INTERNAL MEDICINE

## 2021-06-04 ASSESSMENT — PATIENT HEALTH QUESTIONNAIRE - PHQ9
SUM OF ALL RESPONSES TO PHQ QUESTIONS 1-9: 0
2. FEELING DOWN, DEPRESSED OR HOPELESS: 0
1. LITTLE INTEREST OR PLEASURE IN DOING THINGS: 0
SUM OF ALL RESPONSES TO PHQ9 QUESTIONS 1 & 2: 0

## 2021-06-04 NOTE — PROGRESS NOTES
MA Rooming Questions  Patient: Serafin Stewart  MRN: [id number]    Date: 6/4/2021        New patient        PHQ-9 Total Score: 0 (6/4/2021  1:53 PM)       PHQ-9 Given to (if applicable):               PHQ-9 Score (if applicable):                     [] Positive     []  Negative              Does question #9 need addressed (if applicable)                     [] Yes    []  No               Shirin Chimera, CMA

## 2021-06-06 NOTE — PROGRESS NOTES
Patient Name:  José Orellana  Patient :  1945  Patient MRN:  [id number]     Primary Oncologist: Sonam Villanueva MD  Referring Provider: Wilian Holloway MD     Date of Service: 2021         Chief Complaint:    No chief complaint on file. He came in for follow up visit. Patient Active Problem List:     Chronic kidney disease (CKD), stage IV (severe)      Right inguinal hernia     Urinary tract infection due to extended-spectrum beta lactamase (ESBL) producing Escherichia coli     Hemodialysis access site with mature fistula      HPI:   Cammy Bonilla is a pleasant 51-year-old male patient who was referred for evaluation of melanoma. He went to Dr Alyson Bay for evaluation of mole to right back and had biopsy on May 19, 2021       He has red hair. He was referred to Dr Brianne Smith for wide excision and sentinel lymph node biopsy. We went over NCCN guideline. He has pathological stage IIA. We discussed about observation vs clinical trial. I will discuss with clinical research nurse. I discussed about surveillance. No routine labs or imaging study were recommended. I recommend to follow up with me after surgery. We discussed about using of sunscreen. He has ESRD and is on HD TIW.     Past Medical History:  Past Medical History:   Diagnosis Date    Abnormal urine     abn urine culture 4/3/2017- OR for 2017 cancelled-rescheduled for 2017- ( on 2017 pt states finishes antibiotic today and had repeat urine culture at office last week)    Chronic kidney disease, stage 4, severely decreased GFR ([address] 2016    Dialysis on Mon-Wed-Fri    follows with Dr Lesa Campo Nunez catheter in place     Has had in place since 16    Hearing deficit     both ears --no hearing aids    Hemod[facility] patient [facility])     Sees Dr Derek Avelar    Dialysis on Mon-Wed-Fri    History of blood transfusion     Hypertension     Inguinal hernia     Missing teeth, acquired     upper and lower  some broken off now   Ifeanyi Loser Wears glasses     for reading      Past Surgery History:     DIALYSIS FISTULA CREATION 11/29/2016 Left upper arm   HERNIA REPAIR Right 11/29/2016 INGUINAL HERNIA   PROSTATE SURGERY 04/2017 for BPH  Colonoscopy in 2019. Social History:  He smoked 2 PPD for 19 years and quit in 1995. Denied EtOH or illicit drug use. He has 3 children. Family History  No cancer in the family. Review of Systems: The remainder of ROS is unremarkable. Vital Signs: There were no vitals taken for this visit. Physical Exam:  CONSTITUTIONAL: awake, alert, cooperative, no apparent distress   EYES: pupils equal, round and reactive to light, sclera clear and conjunctiva normal  ENT: Normocephalic, without obvious abnormality, atraumatic  NECK: supple, symmetrical, no jugular venous distension and no carotid bruits   HEMATOLOGIC/LYMPHATIC: no cervical, supraclavicular or axillary lymphadenopathy   LUNGS: no increased work of breathing and clear to auscultation   CARDIOVASCULAR: regular rate and rhythm, normal S1 and S2, no murmur noted  ABDOMEN: normal bowel sounds x 4, soft, non-distended, non-tender, no masses palpated, no hepatosplenomegaly   MUSCULOSKELETAL: full range of motion noted, tone is normal  NEUROLOGIC: awake, alert, oriented to name, place and time. Motor skills grossly intact. SKIN: No jaundice. Healing scar from recent biopsy to right back  EXTREMITIES: no LE edema     Labs:  Hematology:  Lab Results   Component Value Date    WBC 8.7 09/05/2019    RBC 4.00 (L) 09/05/2019    HGB 12.1 (L) 09/05/2019    HCT 37.0 (L) 09/05/2019    MCV 92.5 09/05/2019    MCH 30.3 09/05/2019    MCHC 32.7 09/05/2019    RDW 13.2 09/05/2019     09/05/2019    MPV 10.2 09/05/2019    SEGSPCT 61.6 09/05/2019    EOSRELPCT 3.1 (H) 09/05/2019    BASOPCT 0.9 09/05/2019    LYMPHOPCT 26.9 09/05/2019    MONOPCT 7.2 (H) 09/05/2019    SEGSABS 5.3 09/05/2019    EOSABS 0.3 09/05/2019    BASOSABS 0.1 09/05/2019    LYMPHSABS 2.3 09/05/2019 MONOSABS 0.6 09/05/2019    DIFFTYPE AUTOMATED DIFFERENTIAL 09/05/2019     Chemistry:  Lab Results   Component Value Date     09/05/2019    K 5.1 09/05/2019    CL 95 (L) 09/05/2019    CO2 31 09/05/2019    BUN 32 (H) 09/05/2019    CREATININE 5.0 (H) 09/05/2019    GLUCOSE 98 09/05/2019    CALCIUM 9.8 09/05/2019    PROT 6.8 09/05/2019    LABALBU 4.2 09/05/2019    BILITOT 0.3 09/05/2019    ALKPHOS 102 09/05/2019    AST 17 09/05/2019    ALT 10 09/05/2019    LABGLOM 11 (L) 09/05/2019    GFRAA 14 (L) 09/05/2019     Immunology:  Lab Results   Component Value Date    PROT 6.8 09/05/2019     Coagulation Panel:  Lab Results   Component Value Date    PROTIME 10.8 08/31/2017    INR 0.95 08/31/2017    APTT 29.5 08/31/2017    DDIMER 223 04/15/2011     Tumor Markers:  Lab Results   Component Value Date    PSA 6.5 (H) 02/15/2019        Observations:  PHQ-9 Total Score: 0 (6/4/2021  1:53 PM)       Assessment & Plan:  1. 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 He is scheduled for wide excision and sentinel lymph node sampling. We discussed about observation and clinical trial.  We went over NCCN guideline and discuss about surveillance. 2. I recommend to use sunscreen. I advise to keep age appropriate cancer screening up-to-date. RTC after surgery. All of his questions have been answered for today.     Electronically signed by Morgan Garza MD on 5/27/21 at 7:28 AM EDT

## 2021-06-17 NOTE — PROGRESS NOTES
.Surgery 6/22/21 you will be called 6/21/21 with times               1. Do not eat or drink anything after midnight - unless instructed by your doctor prior to surgery. This includes                   no water, chewing gum or mints. 2. Follow your directions as prescribed by the doctor for your procedure and medications. Take Carvedilol, Isosorbide                  And Pantoprazole in am with a sip. 3. Check with your Doctor regarding stopping Plavix, Coumadin, Lovenox,Effient,Pradaxa,Xarelto, Fragmin or other blood thinners and                   follow their instructions. 4. Do not smoke, and do not drink any alcoholic beverages 24 hours prior to surgery. This includes NA Beer. 5. You may brush your teeth and gargle the morning of surgery. DO NOT SWALLOW WATER   6. You MUST make arrangements for a responsible adult to take you home after your surgery and be able to check on you every couple                   hours for the day. You will not be allowed to leave alone or drive yourself home. It is strongly suggested someone stay with you the first 24                   hrs. Your surgery will be cancelled if you do not have a ride home. 7. Please wear simple, loose fitting clothing to the hospital.  Ricke Cheese not bring valuables (money, credit cards, checkbooks, etc.) Do not wear any                   makeup (including no eye makeup) or nail polish on your fingers or toes. 8. DO NOT wear any jewelry or piercings on day of surgery. All body piercing jewelry must be removed. 9. If you have dentures, they will be removed before going to the OR; we will provide you a container. If you wear contact lenses or glasses,                  they will be removed; please bring a case for them. 10. If you  have a Living Will and Durable Power of  for Healthcare, please bring in a copy.            11. Please bring picture ID,  insurance card, paperwork from the doctors office    (H & P, Consent, & card for implantable devices). 12. Take a shower the night before or morning of your procedure, do not apply any lotion, oil or powder. 13. Wear a mask covering your nose & mouth when entering the hospital. Vaccinated as of 4/2021 - to bring in vaccine card. Quarantine yourself after the test until after your surgery.

## 2021-06-20 ENCOUNTER — ANESTHESIA EVENT (OUTPATIENT)
Dept: OPERATING ROOM | Age: 76
End: 2021-06-20
Payer: MEDICARE

## 2021-06-21 NOTE — ANESTHESIA PRE PROCEDURE
Department of Anesthesiology  Preprocedure Note       Name:  Geovani Martinez   Age:  70 y.o.  :  1945                                          MRN:  [id number]         Date:  2021      Surgeon: Inocente Peraza):  MD Kenia Mueller MD    Procedure: Procedure(s):  SENTINEL LYMPHNODE BIOPSY POSS AXILLA RIGHT OR LEFT POSS GROIN RIGHT OR LEFT  RIGHT BACK EXCISION 5.8 CM MELANOMA (3.1 MM DEPTH) WITH LOCAL FLAP    Medications prior to admission:   Prior to Admission medications    Medication Sig Start Date End Date Taking? Authorizing Provider   tamsulosin (FLOMAX) 0.4 MG capsule TAKE 1 CAPSULE BY MOUTH EVERY DAY 21   Historical Provider, MD   polyethylene glycol (GLYCOLAX) 17 GM/SCOOP powder MIX 1 CAPFUL IN 8 OZ OF LIQUID AND DRINK BY MOUTH ONCE DAILY AS DIRECTED 21   Historical Provider, MD   pantoprazole (PROTONIX) 40 MG tablet TAKE 1 TABLET BY MOUTH EVERY DAY 21   Historical Provider, MD   carvedilol (COREG) 3.125 MG tablet Take 1 tablet by mouth daily 21   Esteban Garcia MD   B Complex-C-Folic Acid (SHEA-ABHISHEK) TABS Take 1 tablet by mouth daily    Historical Provider, MD   RENVELA 800 MG tablet 2 Tabs  PO TID WITH MEALS 17   Historical Provider, MD   finasteride (PROSCAR) 5 MG tablet Take 5 mg by mouth daily  16   Historical Provider, MD   nitroGLYCERIN (NITRODUR) 0.2 MG/HR Place 1 patch onto the skin every other day  16   Historical Provider, MD   isosorbide mononitrate (IMDUR) 30 MG extended release tablet 30 mg daily  16   Historical Provider, MD       Current medications:    No current facility-administered medications for this encounter.      Current Outpatient Medications   Medication Sig Dispense Refill    tamsulosin (FLOMAX) 0.4 MG capsule TAKE 1 CAPSULE BY MOUTH EVERY DAY      polyethylene glycol (GLYCOLAX) 17 GM/SCOOP powder MIX 1 CAPFUL IN 8 OZ OF LIQUID AND DRINK BY MOUTH ONCE DAILY AS DIRECTED      pantoprazole (PROTONIX) 40 MG tablet TAKE 1 TABLET BY MOUTH EVERY DAY      carvedilol (COREG) 3.125 MG tablet Take 1 tablet by mouth daily 180 tablet 3    B Complex-C-Folic Acid (SHEA-ABHISHEK) TABS Take 1 tablet by mouth daily      RENVELA 800 MG tablet 2 Tabs  PO TID WITH MEALS  2    finasteride (PROSCAR) 5 MG tablet Take 5 mg by mouth daily   0    nitroGLYCERIN (NITRODUR) 0.2 MG/HR Place 1 patch onto the skin every other day   0    isosorbide mononitrate (IMDUR) 30 MG extended release tablet 30 mg daily   0       Allergies:  No Known Allergies    Problem List:    Patient Active Problem List   Diagnosis Code    Chronic kidney disease (CKD), stage IV (severe) ([facility]) N18.4    Right inguinal hernia K40.90    Urinary tract infection due to extended-spectrum beta lactamase (ESBL) producing Escherichia coli N39.0, B96.29, Z16.12    Hemodialysis access site with mature fistula ([facility]) Z99.2       Past Medical History:        Diagnosis Date    Abnormal urine     abn urine culture 4/3/2017- OR for 4/13/2017 cancelled-rescheduled for 4/25/2017- ( on 4/24/2017 pt states finishes antibiotic today and had repeat urine culture at office last week)    Chronic kidney disease, stage 4, severely decreased GFR ([facility] [address] 09/2016    Dialysis on Mon-Wed-Fri    follows with Dr Sanju Montejo Nunez catheter in place     Has had in place since 9/29/16    Hearing deficit     both ears --no hearing aids    Hemodialysis patient [facility])     Sees Dr Peters [facility]    Dialysis on Mon-Wed-Fri    History of blood transfusion     Hypertension     Follows with Dr. Sanju Montejo Inguinal hernia     Missing teeth, acquired     upper and lower  some broken off now    Wears glasses     for reading       Past Surgical History:        Procedure Laterality Date    DIALYSIS FISTULA CREATION Left 11/29/2016    Left upper arm    HERNIA REPAIR Right 11/29/2016    INGUINAL HERNIA    PROSTATE SURGERY  04/2017       Social History:    Social History     Tobacco Use    Smoking status: Former Smoker Packs/day: 2.00     Years: 30.00     Pack years: 60.00     Types: Cigarettes     Start date: 48     Quit date: 4/3/1992     Years since quittin.2    Smokeless tobacco: Never Used   Substance Use Topics    Alcohol use: No     Comment: quit 2.5yrs ago--used to drink 3-4 beers daily                                Counseling given: Not Answered      Vital Signs (Current): There were no vitals filed for this visit. BP Readings from Last 3 Encounters:   21 (!) 152/73   19 (!) 152/66   19 (!) 140/68       NPO Status:                                                                                 BMI:   Wt Readings from Last 3 Encounters:   21 140 lb 3.2 oz (63.6 kg)   21 140 lb (63.5 kg)   19 137 lb 9.6 oz (62.4 kg)     There is no height or weight on file to calculate BMI.    CBC:   Lab Results   Component Value Date    WBC 8.7 2019    RBC 4.00 2019    HGB 12.1 2019    HCT 37.0 2019    MCV 92.5 2019    RDW 13.2 2019     2019       CMP:   Lab Results   Component Value Date     2019    K 5.1 2019    CL 95 2019    CO2 31 2019    BUN 32 2019    CREATININE 5.0 2019    GFRAA 14 2019    LABGLOM 11 2019    GLUCOSE 98 2019    PROT 6.8 2019    PROT 7.7 04/15/2011    CALCIUM 9.8 2019    BILITOT 0.3 2019    ALKPHOS 102 2019    AST 17 2019    ALT 10 2019       POC Tests: No results for input(s): POCGLU, POCNA, POCK, POCCL, POCBUN, POCHEMO, POCHCT in the last 72 hours.     Coags:   Lab Results   Component Value Date    PROTIME 10.8 2017    INR 0.95 2017    APTT 29.5 2017       HCG (If Applicable): No results found for: PREGTESTUR, PREGSERUM, HCG, HCGQUANT     ABGs: No results found for: PHART, PO2ART, TXV8TDP, ZNE5XNH, BEART, Q6AGHHOP     Type & Screen (If Applicable):  No results found for:

## 2021-06-21 NOTE — PROGRESS NOTES
6/21/21 - Notified surgery @ [facility], [place] @ 0800, arrival 0600. You may bring 1 person with you for surgery. Understanding verbalized.

## 2021-06-22 ENCOUNTER — HOSPITAL ENCOUNTER (OUTPATIENT)
Dept: NUCLEAR MEDICINE | Age: 76
Discharge: HOME | End: 2021-06-22
Payer: MEDICARE

## 2021-06-22 ENCOUNTER — ANESTHESIA (OUTPATIENT)
Dept: OPERATING ROOM | Age: 76
End: 2021-06-22
Payer: MEDICARE

## 2021-06-22 ENCOUNTER — HOSPITAL ENCOUNTER (OUTPATIENT)
Age: 76
Setting detail: OUTPATIENT SURGERY
Discharge: HOME | End: 2021-06-22
Attending: SURGERY | Admitting: SURGERY
Payer: MEDICARE

## 2021-06-22 VITALS
SYSTOLIC BLOOD PRESSURE: 145 MMHG | HEIGHT: 68 IN | BODY MASS INDEX: 21.22 KG/M2 | WEIGHT: 140 LBS | HEART RATE: 63 BPM | DIASTOLIC BLOOD PRESSURE: 71 MMHG | RESPIRATION RATE: 16 BRPM | OXYGEN SATURATION: 99 % | TEMPERATURE: 97.6 F

## 2021-06-22 VITALS
RESPIRATION RATE: 7 BRPM | SYSTOLIC BLOOD PRESSURE: 139 MMHG | DIASTOLIC BLOOD PRESSURE: 71 MMHG | OXYGEN SATURATION: 96 % | TEMPERATURE: 98.6 F

## 2021-06-22 DIAGNOSIS — C43.59 MALIGNANT MELANOMA OF BACK (HCC): ICD-10-CM

## 2021-06-22 DIAGNOSIS — G89.18 POST-OP PAIN: Primary | ICD-10-CM

## 2021-06-22 PROBLEM — C43.61 MALIGNANT MELANOMA OF RIGHT UPPER EXTREMITY INCLUDING SHOULDER (HCC): Status: ACTIVE | Noted: 2021-06-22

## 2021-06-22 LAB
ANION GAP SERPL CALCULATED.3IONS-SCNC: 11 MMOL/L (ref 4–16)
BASOPHILS ABSOLUTE: 0.1 K/CU MM
BASOPHILS RELATIVE PERCENT: 0.5 % (ref 0–1)
BUN BLDV-MCNC: 42 MG/DL (ref 6–23)
CALCIUM SERPL-MCNC: 9.3 MG/DL (ref 8.3–10.6)
CHLORIDE BLD-SCNC: 100 MMOL/L (ref 99–110)
CO2: 28 MMOL/L (ref 21–32)
CREAT SERPL-MCNC: 5.5 MG/DL (ref 0.9–1.3)
DIFFERENTIAL TYPE: ABNORMAL
EOSINOPHILS ABSOLUTE: 0.2 K/CU MM
EOSINOPHILS RELATIVE PERCENT: 1.8 % (ref 0–3)
GFR AFRICAN AMERICAN: 12 ML/MIN/1.73M2
GFR NON-AFRICAN AMERICAN: 10 ML/MIN/1.73M2
GLUCOSE BLD-MCNC: 90 MG/DL (ref 70–99)
HCT VFR BLD CALC: 39 % (ref 42–52)
HEMOGLOBIN: 12.5 GM/DL (ref 13.5–18)
IMMATURE NEUTROPHIL %: 0.3 % (ref 0–0.43)
LYMPHOCYTES ABSOLUTE: 2 K/CU MM
LYMPHOCYTES RELATIVE PERCENT: 21.6 % (ref 24–44)
MCH RBC QN AUTO: 29.2 PG (ref 27–31)
MCHC RBC AUTO-ENTMCNC: 32.1 % (ref 32–36)
MCV RBC AUTO: 91.1 FL (ref 78–100)
MONOCYTES ABSOLUTE: 0.7 K/CU MM
MONOCYTES RELATIVE PERCENT: 6.9 % (ref 0–4)
NUCLEATED RBC %: 0 %
PDW BLD-RTO: 13 % (ref 11.7–14.9)
PLATELET # BLD: 187 K/CU MM (ref 140–440)
PMV BLD AUTO: 10 FL (ref 7.5–11.1)
POTASSIUM SERPL-SCNC: 4.7 MMOL/L (ref 3.5–5.1)
RBC # BLD: 4.28 M/CU MM (ref 4.6–6.2)
SEGMENTED NEUTROPHILS ABSOLUTE COUNT: 6.5 K/CU MM
SEGMENTED NEUTROPHILS RELATIVE PERCENT: 68.9 % (ref 36–66)
SODIUM BLD-SCNC: 139 MMOL/L (ref 135–145)
TOTAL IMMATURE NEUTOROPHIL: 0.03 K/CU MM
TOTAL NUCLEATED RBC: 0 K/CU MM
WBC # BLD: 9.5 K/CU MM (ref 4–10.5)

## 2021-06-22 PROCEDURE — 2580000003 HC RX 258: Performed by: ANESTHESIOLOGY

## 2021-06-22 PROCEDURE — 2500000003 HC RX 250 WO HCPCS: Performed by: SURGERY

## 2021-06-22 PROCEDURE — 2580000003 HC RX 258: Performed by: SURGERY

## 2021-06-22 PROCEDURE — 88341 IMHCHEM/IMCYTCHM EA ADD ANTB: CPT | Performed by: PATHOLOGY

## 2021-06-22 PROCEDURE — 6360000002 HC RX W HCPCS: Performed by: NURSE ANESTHETIST, CERTIFIED REGISTERED

## 2021-06-22 PROCEDURE — 7100000000 HC PACU RECOVERY - FIRST 15 MIN: Performed by: SURGERY

## 2021-06-22 PROCEDURE — A9520 TC99 TILMANOCEPT DIAG 0.5MCI: HCPCS | Performed by: SURGERY

## 2021-06-22 PROCEDURE — 2500000003 HC RX 250 WO HCPCS: Performed by: PLASTIC SURGERY

## 2021-06-22 PROCEDURE — 3600000013 HC SURGERY LEVEL 3 ADDTL 15MIN: Performed by: SURGERY

## 2021-06-22 PROCEDURE — 6360000002 HC RX W HCPCS: Performed by: SURGERY

## 2021-06-22 PROCEDURE — 88305 TISSUE EXAM BY PATHOLOGIST: CPT | Performed by: PATHOLOGY

## 2021-06-22 PROCEDURE — 3700000001 HC ADD 15 MINUTES (ANESTHESIA): Performed by: SURGERY

## 2021-06-22 PROCEDURE — 80048 BASIC METABOLIC PNL TOTAL CA: CPT

## 2021-06-22 PROCEDURE — 7100000011 HC PHASE II RECOVERY - ADDTL 15 MIN: Performed by: SURGERY

## 2021-06-22 PROCEDURE — 78195 LYMPH SYSTEM IMAGING: CPT

## 2021-06-22 PROCEDURE — 85025 COMPLETE CBC W/AUTO DIFF WBC: CPT

## 2021-06-22 PROCEDURE — 7100000010 HC PHASE II RECOVERY - FIRST 15 MIN: Performed by: SURGERY

## 2021-06-22 PROCEDURE — 2500000003 HC RX 250 WO HCPCS: Performed by: NURSE ANESTHETIST, CERTIFIED REGISTERED

## 2021-06-22 PROCEDURE — 3430000000 HC RX DIAGNOSTIC RADIOPHARMACEUTICAL: Performed by: SURGERY

## 2021-06-22 PROCEDURE — 2709999900 HC NON-CHARGEABLE SUPPLY: Performed by: SURGERY

## 2021-06-22 PROCEDURE — 88342 IMHCHEM/IMCYTCHM 1ST ANTB: CPT | Performed by: PATHOLOGY

## 2021-06-22 PROCEDURE — 3600000003 HC SURGERY LEVEL 3 BASE: Performed by: SURGERY

## 2021-06-22 PROCEDURE — 3700000000 HC ANESTHESIA ATTENDED CARE: Performed by: SURGERY

## 2021-06-22 PROCEDURE — 3430000000 HC RX DIAGNOSTIC RADIOPHARMACEUTICAL

## 2021-06-22 PROCEDURE — A9520 TC99 TILMANOCEPT DIAG 0.5MCI: HCPCS

## 2021-06-22 PROCEDURE — 7100000001 HC PACU RECOVERY - ADDTL 15 MIN: Performed by: SURGERY

## 2021-06-22 RX ORDER — HYDROMORPHONE HCL 110MG/55ML
0.5 PATIENT CONTROLLED ANALGESIA SYRINGE INTRAVENOUS EVERY 5 MIN PRN
Status: DISCONTINUED | OUTPATIENT
Start: 2021-06-22 | End: 2021-06-22 | Stop reason: HOSPADM

## 2021-06-22 RX ORDER — HYDRALAZINE HYDROCHLORIDE 20 MG/ML
5 INJECTION INTRAMUSCULAR; INTRAVENOUS EVERY 10 MIN PRN
Status: DISCONTINUED | OUTPATIENT
Start: 2021-06-22 | End: 2021-06-22 | Stop reason: HOSPADM

## 2021-06-22 RX ORDER — HYDROCODONE BITARTRATE AND ACETAMINOPHEN 5; 325 MG/1; MG/1
2 TABLET ORAL PRN
Status: DISCONTINUED | OUTPATIENT
Start: 2021-06-22 | End: 2021-06-22 | Stop reason: HOSPADM

## 2021-06-22 RX ORDER — DIPHENHYDRAMINE HYDROCHLORIDE 50 MG/ML
12.5 INJECTION INTRAMUSCULAR; INTRAVENOUS
Status: DISCONTINUED | OUTPATIENT
Start: 2021-06-22 | End: 2021-06-22 | Stop reason: HOSPADM

## 2021-06-22 RX ORDER — FENTANYL CITRATE 50 UG/ML
INJECTION, SOLUTION INTRAMUSCULAR; INTRAVENOUS PRN
Status: DISCONTINUED | OUTPATIENT
Start: 2021-06-22 | End: 2021-06-22 | Stop reason: SDUPTHER

## 2021-06-22 RX ORDER — MEPERIDINE HYDROCHLORIDE 25 MG/ML
12.5 INJECTION INTRAMUSCULAR; INTRAVENOUS; SUBCUTANEOUS EVERY 5 MIN PRN
Status: DISCONTINUED | OUTPATIENT
Start: 2021-06-22 | End: 2021-06-22 | Stop reason: HOSPADM

## 2021-06-22 RX ORDER — ONDANSETRON 2 MG/ML
INJECTION INTRAMUSCULAR; INTRAVENOUS PRN
Status: DISCONTINUED | OUTPATIENT
Start: 2021-06-22 | End: 2021-06-22 | Stop reason: SDUPTHER

## 2021-06-22 RX ORDER — PROPOFOL 10 MG/ML
INJECTION, EMULSION INTRAVENOUS PRN
Status: DISCONTINUED | OUTPATIENT
Start: 2021-06-22 | End: 2021-06-22 | Stop reason: SDUPTHER

## 2021-06-22 RX ORDER — ROCURONIUM BROMIDE 10 MG/ML
INJECTION, SOLUTION INTRAVENOUS PRN
Status: DISCONTINUED | OUTPATIENT
Start: 2021-06-22 | End: 2021-06-22 | Stop reason: SDUPTHER

## 2021-06-22 RX ORDER — DEXAMETHASONE SODIUM PHOSPHATE 4 MG/ML
INJECTION, SOLUTION INTRA-ARTICULAR; INTRALESIONAL; INTRAMUSCULAR; INTRAVENOUS; SOFT TISSUE PRN
Status: DISCONTINUED | OUTPATIENT
Start: 2021-06-22 | End: 2021-06-22 | Stop reason: SDUPTHER

## 2021-06-22 RX ORDER — FENTANYL CITRATE 50 UG/ML
50 INJECTION, SOLUTION INTRAMUSCULAR; INTRAVENOUS EVERY 5 MIN PRN
Status: DISCONTINUED | OUTPATIENT
Start: 2021-06-22 | End: 2021-06-22 | Stop reason: HOSPADM

## 2021-06-22 RX ORDER — LABETALOL HYDROCHLORIDE 5 MG/ML
5 INJECTION, SOLUTION INTRAVENOUS EVERY 10 MIN PRN
Status: DISCONTINUED | OUTPATIENT
Start: 2021-06-22 | End: 2021-06-22 | Stop reason: HOSPADM

## 2021-06-22 RX ORDER — PROMETHAZINE HYDROCHLORIDE 25 MG/ML
6.25 INJECTION, SOLUTION INTRAMUSCULAR; INTRAVENOUS
Status: DISCONTINUED | OUTPATIENT
Start: 2021-06-22 | End: 2021-06-22 | Stop reason: HOSPADM

## 2021-06-22 RX ORDER — OXYCODONE HYDROCHLORIDE AND ACETAMINOPHEN 5; 325 MG/1; MG/1
1 TABLET ORAL EVERY 6 HOURS PRN
Qty: 28 TABLET | Refills: 0 | Status: SHIPPED | OUTPATIENT
Start: 2021-06-22 | End: 2021-06-29

## 2021-06-22 RX ORDER — BUPIVACAINE HYDROCHLORIDE AND EPINEPHRINE 2.5; 5 MG/ML; UG/ML
INJECTION, SOLUTION EPIDURAL; INFILTRATION; INTRACAUDAL; PERINEURAL
Status: COMPLETED | OUTPATIENT
Start: 2021-06-22 | End: 2021-06-22

## 2021-06-22 RX ORDER — BUPIVACAINE HYDROCHLORIDE 5 MG/ML
INJECTION, SOLUTION EPIDURAL; INTRACAUDAL
Status: COMPLETED | OUTPATIENT
Start: 2021-06-22 | End: 2021-06-22

## 2021-06-22 RX ORDER — LIDOCAINE HYDROCHLORIDE 20 MG/ML
INJECTION, SOLUTION INTRAVENOUS PRN
Status: DISCONTINUED | OUTPATIENT
Start: 2021-06-22 | End: 2021-06-22 | Stop reason: SDUPTHER

## 2021-06-22 RX ORDER — SODIUM CHLORIDE, SODIUM LACTATE, POTASSIUM CHLORIDE, CALCIUM CHLORIDE 600; 310; 30; 20 MG/100ML; MG/100ML; MG/100ML; MG/100ML
INJECTION, SOLUTION INTRAVENOUS CONTINUOUS
Status: DISCONTINUED | OUTPATIENT
Start: 2021-06-22 | End: 2021-06-22 | Stop reason: HOSPADM

## 2021-06-22 RX ORDER — HYDROCODONE BITARTRATE AND ACETAMINOPHEN 5; 325 MG/1; MG/1
1 TABLET ORAL PRN
Status: DISCONTINUED | OUTPATIENT
Start: 2021-06-22 | End: 2021-06-22 | Stop reason: HOSPADM

## 2021-06-22 RX ORDER — METOCLOPRAMIDE HYDROCHLORIDE 5 MG/ML
10 INJECTION INTRAMUSCULAR; INTRAVENOUS
Status: DISCONTINUED | OUTPATIENT
Start: 2021-06-22 | End: 2021-06-22 | Stop reason: HOSPADM

## 2021-06-22 RX ORDER — ISOSULFAN BLUE 50 MG/5ML
INJECTION, SOLUTION SUBCUTANEOUS
Status: COMPLETED | OUTPATIENT
Start: 2021-06-22 | End: 2021-06-22

## 2021-06-22 RX ORDER — METOPROLOL TARTRATE 5 MG/5ML
INJECTION INTRAVENOUS PRN
Status: DISCONTINUED | OUTPATIENT
Start: 2021-06-22 | End: 2021-06-22 | Stop reason: SDUPTHER

## 2021-06-22 RX ADMIN — METOPROLOL TARTRATE 2 MG: 5 INJECTION, SOLUTION INTRAVENOUS at 14:13

## 2021-06-22 RX ADMIN — ONDANSETRON 4 MG: 2 INJECTION INTRAMUSCULAR; INTRAVENOUS at 14:52

## 2021-06-22 RX ADMIN — DEXAMETHASONE SODIUM PHOSPHATE 4 MG: 4 INJECTION, SOLUTION INTRAMUSCULAR; INTRAVENOUS at 13:36

## 2021-06-22 RX ADMIN — SUGAMMADEX 200 MG: 100 INJECTION, SOLUTION INTRAVENOUS at 14:55

## 2021-06-22 RX ADMIN — ROCURONIUM BROMIDE 50 MG: 10 INJECTION INTRAVENOUS at 13:22

## 2021-06-22 RX ADMIN — LIDOCAINE HYDROCHLORIDE 80 MG: 20 INJECTION, SOLUTION INTRAVENOUS at 13:22

## 2021-06-22 RX ADMIN — TILMANOCEPT 0.55 MILLICURIE: KIT at 09:16

## 2021-06-22 RX ADMIN — METOPROLOL TARTRATE 2 MG: 5 INJECTION, SOLUTION INTRAVENOUS at 14:04

## 2021-06-22 RX ADMIN — PROPOFOL 100 MG: 10 INJECTION, EMULSION INTRAVENOUS at 13:22

## 2021-06-22 RX ADMIN — CEFAZOLIN 1000 MG: 1 INJECTION, POWDER, FOR SOLUTION INTRAMUSCULAR; INTRAVENOUS; PARENTERAL at 13:15

## 2021-06-22 RX ADMIN — SODIUM CHLORIDE, POTASSIUM CHLORIDE, SODIUM LACTATE AND CALCIUM CHLORIDE: 600; 310; 30; 20 INJECTION, SOLUTION INTRAVENOUS at 10:25

## 2021-06-22 RX ADMIN — FENTANYL CITRATE 100 MCG: 50 INJECTION, SOLUTION INTRAMUSCULAR; INTRAVENOUS at 13:22

## 2021-06-22 ASSESSMENT — PULMONARY FUNCTION TESTS
PIF_VALUE: 14
PIF_VALUE: 24
PIF_VALUE: 3
PIF_VALUE: 20
PIF_VALUE: 19
PIF_VALUE: 11
PIF_VALUE: 20
PIF_VALUE: 20
PIF_VALUE: 18
PIF_VALUE: 20
PIF_VALUE: 18
PIF_VALUE: 19
PIF_VALUE: 20
PIF_VALUE: 1
PIF_VALUE: 20
PIF_VALUE: 20
PIF_VALUE: 19
PIF_VALUE: 20
PIF_VALUE: 18
PIF_VALUE: 17
PIF_VALUE: 21
PIF_VALUE: 20
PIF_VALUE: 20
PIF_VALUE: 21
PIF_VALUE: 20
PIF_VALUE: 4
PIF_VALUE: 16
PIF_VALUE: 20
PIF_VALUE: 19
PIF_VALUE: 19
PIF_VALUE: 3
PIF_VALUE: 18
PIF_VALUE: 15
PIF_VALUE: 20
PIF_VALUE: 21
PIF_VALUE: 18
PIF_VALUE: 22
PIF_VALUE: 20
PIF_VALUE: 21
PIF_VALUE: 20
PIF_VALUE: 10
PIF_VALUE: 19
PIF_VALUE: 21
PIF_VALUE: 18
PIF_VALUE: 21
PIF_VALUE: 19
PIF_VALUE: 21
PIF_VALUE: 11
PIF_VALUE: 21
PIF_VALUE: 19
PIF_VALUE: 21
PIF_VALUE: 19
PIF_VALUE: 20
PIF_VALUE: 20
PIF_VALUE: 8
PIF_VALUE: 20
PIF_VALUE: 18
PIF_VALUE: 19
PIF_VALUE: 8
PIF_VALUE: 1
PIF_VALUE: 20
PIF_VALUE: 1
PIF_VALUE: 18
PIF_VALUE: 18
PIF_VALUE: 21
PIF_VALUE: 19
PIF_VALUE: 18
PIF_VALUE: 19
PIF_VALUE: 1
PIF_VALUE: 17
PIF_VALUE: 19
PIF_VALUE: 18
PIF_VALUE: 0
PIF_VALUE: 14
PIF_VALUE: 17
PIF_VALUE: 20
PIF_VALUE: 16
PIF_VALUE: 20
PIF_VALUE: 11
PIF_VALUE: 19
PIF_VALUE: 19
PIF_VALUE: 14
PIF_VALUE: 17
PIF_VALUE: 18
PIF_VALUE: 19
PIF_VALUE: 20
PIF_VALUE: 19
PIF_VALUE: 20

## 2021-06-22 ASSESSMENT — PAIN SCALES - GENERAL
PAINLEVEL_OUTOF10: 0
PAINLEVEL_OUTOF10: 2
PAINLEVEL_OUTOF10: 3
PAINLEVEL_OUTOF10: 1

## 2021-06-22 ASSESSMENT — PAIN DESCRIPTION - DESCRIPTORS
DESCRIPTORS: ACHING

## 2021-06-22 ASSESSMENT — PAIN DESCRIPTION - LOCATION
LOCATION: BACK

## 2021-06-22 ASSESSMENT — PAIN DESCRIPTION - ORIENTATION
ORIENTATION: UPPER;RIGHT
ORIENTATION: UPPER
ORIENTATION: RIGHT;UPPER

## 2021-06-22 ASSESSMENT — PAIN DESCRIPTION - PAIN TYPE
TYPE: SURGICAL PAIN

## 2021-06-22 ASSESSMENT — PAIN - FUNCTIONAL ASSESSMENT: PAIN_FUNCTIONAL_ASSESSMENT: 0-10

## 2021-06-22 ASSESSMENT — PAIN DESCRIPTION - FREQUENCY
FREQUENCY: INTERMITTENT
FREQUENCY: CONTINUOUS
FREQUENCY: CONTINUOUS

## 2021-06-22 NOTE — BRIEF OP NOTE
Brief Postoperative Note      Patient: Puma Fowler  YOB: 1945  MRN: [id number]    Date of Procedure: 6/22/2021    Pre-Op Diagnosis: Mk Pap ON BACK    Post-Op Diagnosis: Same       Procedure:  [place] lymph node biopsy right axilla      Injection lymphazurin blue    Surgeon:  Tyshawn Alvarez MD    Assistant:  * No surgical staff found *    Anesthesia: General with 0.5 % marcaine plain    Estimated Blood Loss (mL): Minimal    Complications: None    Specimens:   ID Type Source Tests Collected by Time Destination   A : Melanoma Right Upper Back-stitch @ 10 o'clock Specimen Back SURGICAL PATHOLOGY Demarco Elkins MD 6/22/2021 1407    B : sentinel lymph node #1 Tissue Lymph Node SURGICAL PATHOLOGY Raquel Dacosta MD 6/22/2021 1503    C : sentinel lymph node #2 Tissue Lymph Node SURGICAL PATHOLOGY Raquel Dacosta MD 6/22/2021 1504        Implants:  * No implants in log *      Drains: * No LDAs found *    Findings: 2 sentinel lymph nodes were identified    Electronically signed by Raquel Dacosta MD on 6/22/2021 at 3:29 PM   Dictated #[id number]

## 2021-06-22 NOTE — PROGRESS NOTES
1503: Arrived to PACU from OR. Monitors applied, alarms on. Report obtained from Jose Carranza and SRIDHAR Moreira CRNA. 1520: Turned and repositioned in bed, warm blankets on. Denies need for pain med at present. Ice chips given. 1548: Transported to [facility].

## 2021-06-22 NOTE — H&P
History and Physical    Patient Name: Johann Wilburn                              YOB: 1945  Exam Date: 6/2/2021     PCP:  Dinah Murillo MD                       Referring Physician:  Dr. Ras Bernal, plastic surgery     Chief Complaint:  New diagnosis of melanoma     History of Present Illness: The patient is a 70 y.o. male who states he had a skin lesion on his back for decades. Recently however this started to change and he noticed some bleeding after he scratched the area. He has been seen by Dr. Ras Bernal from plastic surgery and has had a biopsy which is consistent with the melanoma. The patient is planning to have a wide local excision of the area and is in need of a sentinel lymph node biopsy. His past medical history is significant for chronic renal failure and he does receive dialysis every Monday, Wednesday and Friday.  He states he does get dialysis at 5:30 in the morning each of those days.     Past Medical History        Past Medical History:   Diagnosis Date    Abnormal urine       abn urine culture 4/3/2017- OR for 4/13/2017 cancelled-rescheduled for 4/25/2017- ( on 4/24/2017 pt states finishes antibiotic today and had repeat urine culture at office last week)    Chronic kidney disease, stage 4, severely decreased GFR ([facility] [address] 09/2016     Dialysis on Mon-Wed-Fri    follows with Dr Pamela Mota Nunez catheter in place       Has had in place since 9/29/16    Hearing deficit       both ears --no hearing aids    Hemodialysis patient [facility])       Sees Dr Kj Parker    Dialysis on Mon-Wed-Fri    History of blood transfusion      Hypertension      Inguinal hernia      Missing teeth, acquired       upper and lower  some broken off now    Wears glasses       for reading         Past Surgical History         Past Surgical History:   Procedure Laterality Date    DIALYSIS FISTULA CREATION Left 11/29/2016     Left upper arm    HERNIA REPAIR Right 11/29/2016     INGUINAL HERNIA    PROSTATE SURGERY   2017         Home Medications           Prior to Admission medications    Medication Sig Start Date End Date Taking? Authorizing Provider   tamsulosin (FLOMAX) 0.4 MG capsule TAKE 1 CAPSULE BY MOUTH EVERY DAY 21     Historical Provider, MD   polyethylene glycol (GLYCOLAX) 17 GM/SCOOP powder MIX 1 CAPFUL IN 8 OZ OF LIQUID AND DRINK BY MOUTH ONCE DAILY AS DIRECTED 21     Historical Provider, MD   pantoprazole (PROTONIX) 40 MG tablet TAKE 1 TABLET BY MOUTH EVERY DAY 21     Historical Provider, MD   carvedilol (COREG) 3.125 MG tablet Take 1 tablet by mouth daily 21     Hira Sierra MD   nitrofurantoin, macrocrystal-monohydrate, (MACROBID) 100 MG capsule TK 1 C PO Q 12 H WF 19     Historical Provider, MD SOSA Complex-C-Folic Acid (SHEA-ABHISHEK) TABS Take 1 tablet by mouth daily       Historical Provider, MD   RENVELA 800 MG tablet 2 Tabs  PO TID WITH MEALS 17     Historical Provider, MD   finasteride (PROSCAR) 5 MG tablet Take 5 mg by mouth daily  16     Historical Provider, MD   nitroGLYCERIN (NITRODUR) 0.2 MG/HR Place 1 patch onto the skin every other day  16     Historical Provider, MD   isosorbide mononitrate (IMDUR) 30 MG extended release tablet 30 mg daily  16     Historical Provider, MD         No Known Allergies      Social History            Tobacco Use    Smoking status: Former Smoker       Packs/day: 2.00       Years: 30.00       Pack years: 60.00       Types: Cigarettes       Start date: 48       Quit date: 4/3/1992       Years since quittin.1    Smokeless tobacco: Never Used   Substance Use Topics    Alcohol use:  No       Comment: quit 2.5yrs ago--used to drink 3-4 beers daily      Family History         Family History   Problem Relation Age of Onset    Diabetes Mother      Diabetes Brother      Heart Disease Brother              REVIEW OF SYSTEMS: (POSITIVES WILL BE UNDERLINED)  CONSTITUTIONAL:    Weight change,fatigue, fever, chills  EYES:  Diplopia, change in vision  EARS:  hearing loss, tinnitus, vertigo  NOSE:   epistaxis  MOUTH/THROAT:     hoarseness, sore throat. RESPIRATORY:  SOB,  cough, sputum, hemoptysis  CARDIOVASCULAR : chest pain,palpitations, dyspnea exertion, orthopnea, paroxysmal nocturnal dyspnea, pedal edema. GASTROINTESTINAL:  See HPI  GENITOURINARY:   dysuria, hematuria, . HEMATOLOGIC/LYMPHATIC:   Anemia, bleeding tendencies. MUSCULOSKELETAL:    myalgias,  joint pain  NEUROLOGICAL:   Loss of Consciousness, paresthesias, anesthesias, focal weakness  SKIN :   History of dermatitis, rashes  PSYCHIATRIC:  depression, , anxiety past psychosis  ENDOCRINE:  History of diabetes, thyroid disease  ALL/IMM : allergies, rashes     Physical Exam:  Temp 97.2 °F (36.2 °C)   Ht 5' 8\" (1.727 m)   Wt 140 lb (63.5 kg)   BMI 21.29 kg/m²   Pt is awake, alert, oriented to person, place and time, appropriate with exam  HEENT:  Has non-icteric sclerae, no JVD  CTA bilaterally, with good excursion, new supraclavicular adenopathy palpable  RRR, no murmurs appreciated  ABDOMEN:  Soft, liver edge and spleen edge not palpable, NT/ND  Back: right of midline lower half of back, scabbed area that is dry, non-pigmented, no palpable nodularity  Ext:  Warm, no axillary adenopathy palpable     Biopsy:  [facility], dermatopathology report.  5/19/2021  Skin of the right back  Melanoma, the lesion extends to the peripheral and deep tissue edges  Nodular type  3.1 mm thickness, Breslow  Benjamin level IV  Ulceration: absent  Dermal mitotic rate:  4  microsatellittosis:  Not identified  Vertical growth phase:  Present  Regression:  Absent  Angiolymphatic invasion: not identified  Neurotropism:  Not identified  Tumor infiltrating lymphocytes:  Absent  Precursor lesion:  Not identified  Pathologic stage:  PT3a, pNx, pMx     -I have personally reviewed the patients pathology lab results and discussed pertinent findings with the patient.     Assessment and Plan:  The patient presents with signs and symptoms consistent with a nodular melanoma of his back. This is a 3.1 mm thickness a Benjamin level IV. The natural history, prognosis and treatment of melanoma were discussed. The discussion included description of Benjamin's levels, Breslow thickness, melanoma staging and the difference between staging and Benjamin's levels. The discussion of the treatment included the techniques necessary for resection or wide local excision and sentinel node biopsy as well as the appropriate applications. I discussed the plan for injection of a radio nucleotide done by the radiologist and then a subsequent lymphoscintigraphy imaging to identify the gene basin for surgery. I discussed the possible use of a blue dye to help with localization as well. I discussed that the area for the sentinel lymph node biopsy will be determined prior to entering the operating room and the patient will be informed of the surgical site for the sentinel lymph node biopsy. Given the location of the melanoma it is possible that the sentinel lymph node may be in either the right axilla, left axilla, right inguinal region/groin or left inguinal region/groin or any combination of those four locations. We discussed that the sentinel lymph node will be sent to pathology and will likely take approximately one week to receive those results. I discussed that this may lead to further surgery and a possible dissection of that lymph node basin. We will coordinate our surgery with Dr. Pelayo Bring and around the patient's dialysis schedule. I have discussed with the patient the risks and benefits of surgery including but not limited to risk of bleeding, risk of infection, risk of injury to surrounding tissue and the possibility of failure of treatment. I discussed with the patient that they may need future procedures or surgeries. I discussed the limitations and restrictions in the post-operative period and the pre-operative preparation.  The patient's questions were answered and He is agreeable to proceeding with surgery. Consent form was signed and surgery will be scheduled in the near future.     Statement of medical necessity. Surgical intervention required to alleviate patients symptoms/disease as described above. Addendum:  6/22/2021, 1300  The H&P was reviewed, the patient was examined and No Change has occurred in the patient's condition since the H&P was completed. I did review the lymphoscintigraphy report and it shows the right axilla has update consistent with a sentinel node. I discussed this with the patient and discussed the risks and benefits of surgery and he signed the consent form. His right axilla was marked with an ink marker and plan fr surgery this pm. He states he would like me to call his daughter Bryon Savage after surgery.

## 2021-06-23 NOTE — PROGRESS NOTES
1550 Pt received from PACU and report received from [facility]. Pt denies c/o. Dressing right axilla dry and intact. Large Tegaderm to mid upper back small red drainage and Celi Hernandez RN stated no change. Pt given apple juice. Call light in reach. 1620 Pt up to sie of bed with assist. Pt stated that he fels a little dizzy and would like to lie back down for a while. Daughter at bedside. Call light in reach. 1710 Pt up to side of bed again with assist and states that he feels better. Assisted pt to dress. 1725 Pt up to restroom with assist. Pt voided without difficulty and back to room. Daughter to get car. Call light in reach. Dressing right axilla dry and intact. Large Tegaderm to mid upper back small red drainage, no change. [phone] Pt discharged to home per wheelchair to [employer] vehicle. Ice bags given to pt.

## 2021-06-23 NOTE — OP NOTE
[address], [address] [place] [address]                                OPERATIVE REPORT    PATIENT NAME: [place] H[place]                   :        1945  MED REC NO:   [id number]                          ROOM:  ACCOUNT NO:   [de-identified]                           ADMIT DATE: 2021  PROVIDER:     Atul Rudolph MD    DATE OF PROCEDURE:  2021    PRE-PROCEDURE DIAGNOSIS:  Malignant melanoma of the right upper back. POSTOPERATIVE DIAGNOSIS:  Malignant melanoma of the right upper back. PROCEDURE PERFORMED:  1. Injection of isosulfan blue to the right back. 2.  [place] lymph node biopsy x2, right axilla, deep lymph nodes. ESTIMATED BLOOD LOSS:  Minimal.    DRAINS/LINES:  None. COMPLICATIONS:  None. ANESTHESIA:  General endotracheal anesthesia with 0.5% Marcaine plain. FINDINGS:  The patient had two lymph nodes that were consistent with  sentinel nodes that were removed and sent permanent to Pathology. PERTINENT HISTORY:  This is a 26-year-old gentleman who presented with a  lesion on his back. He noticed it is starting to change and he had some  bleeding from the area. He saw Dr. Waldemar Vásquez from Plastic Surgery and had a  biopsy which was consistent with a malignant melanoma, nodular type  3.1-mm thickness, Benjamin level IV, pathologic stage T3A. The patient was  then referred for evaluation for sentinel lymph node biopsy. I  discussed with him and his daughter, the risks and benefits of surgery  including but not limited to risk of bleeding, risk of infection, risk  of scarring, risk of seroma fluid collection, risk of failure to  treatment, risk of needing to have additional surgeries or procedures. I discussed with him the injection of blue medication around the area of  the melanoma and then an incision for the sentinel node.   He did have  injection of radionuclide in the morning of surgery and  lymphoscintigraphy showed that the sentinel node was located in the  right axilla. I did discuss that with the patient preoperatively and  consent was signed. I did discuss with him that if the sentinel lymph  node was positive, he may need to return to the operating room for an  axillary dissection. He stated understanding. DESCRIPTION OF PROCEDURE:  The patient was seen in the preoperative  holding area. His right axilla was marked with an ink marker, he was  then brought back to the operating suite. Unilateral lower extremity  compression boot was placed and then general endotracheal anesthesia was  provided per Anesthesiology. The patient was then placed prone on the  operating table. His melena was located to the right of the midline in  the upper back and this area was prepped with alcohol solution and 5 mL  of isosulfan blue were injected in a deep dermal injection. The area  was massaged for 5 minutes. Next, Dr. Elvira Oviedo did his portion of the  surgery which will be dictated separately. Once he was done with his  portion of the surgery, the patient was then placed supine on the  [facility] and admitted back on to the operating table in a supine  position. His right upper extremity and chest wall were prepped and  draped in a sterile fashion. A 0.5% Marcaine plain was infiltrated into  the skin and subcutaneous tissue of the right axilla. Using the probe,  an area of radioactivity was identified. A 3-cm incision was made and  carried into subcutaneous tissue. One lymph node was readily identified  and consistent with sentinel node, and this was excised. Surgical clips  were placed on the lymphatic channels surrounding this and this was  completely removed. Posterior to this was a second lymph node that had  higher count consistent with sentinel node and this was also removed  with lymphatic channels being clipped with surgical clips.   This node  was then sent and labeled as sentinel lymph node #1 and the other lymph  node was sentinel lymph node #2. These were sent for permanent,  hemostasis was achieved using Bovie electrocautery as well as surgical  clips. A 3-0 Vicryl suture running stitch was used in the deep  subcutaneous tissues, 3-0 Vicryl suture in a running stitch was used in  superficial subcutaneous tissue, and a 4-0 Monocryl suture in  subcuticular stitch was used to approximate the skin edges. Steri-Strips and sterile dressing were applied. The patient was then  extubated and transferred to post anesthesia care unit in stable  condition with plans for discharge home. I did speak with his daughter,  Emmanuel Espitia at [phone]. I discussed with her the intraoperative  findings, the postoperative wound care and the followup plan.         Abiodun Anaya MD    D: 06/22/2021 15:04:15       T: 06/22/2021 15:09:14     [email or web address]  Job#: [id number]     Doc#: [id number]    CC:  MD Allen Mazariegos MD

## 2021-06-23 NOTE — OP NOTE
[address] [place] [address], [address] [place] [address]                                OPERATIVE REPORT    PATIENT NAME: Gaby Molina                   :        1945  MED REC NO:   [id number]                          ROOM:  ACCOUNT NO:   [de-identified]                           ADMIT DATE: 2021  PROVIDER:     Daniela Acosta MD    DATE OF PROCEDURE:  2021    SURGEON:  Daniela Acosta MD    ANESTHESIA:  General endotracheal.    INDICATION FOR SURGERY:  A 79-year-old white male with a biopsy-proven  malignant melanoma of the right upper back. The patient is now here for  wide excision and reconstruction of the melanoma of the back in  conjunction with a sentinel node biopsy per Dr. Ronald Cole. PREOPERATIVE DIAGNOSIS:  Malignant melanoma, right upper back. POSTOPERATIVE DIAGNOSIS:  Malignant melanoma, right upper back. PROCEDURE:  Excision of malignant melanoma, right upper back, 5.8 cm  with local flap reconstruction 40 cm2. TECHNIQUE:  The 2 cm margins were drawn around the borders of the  biopsy. The area was then converted into a rhomboid shape with an  adjacent Limberg flap. The patient was then brought to the operating  room and placed on the operative table in the prone position. The area  was then infiltrated with 0.25% Marcaine with epinephrine. The right  upper back area was then prepped and draped in usual sterile fashion. The lesion was excised in the rhomboid shape incorporating the minimum  2-cm margins and down to including the muscle fascia. This specimen was  carefully identified and suture placed for orientation. This specimen  was sent for permanent section pathology. The adjacent Limberg flap was  likewise incised and raised at the level of the muscle fascia and  rotated and transposed into the defect. Meticulous hemostasis was  achieved using the cautery.   The subcutaneous and dermal edges were  reapproximated using 2-0 Vicryl sutures in an interrupted buried  fashion. The skin edges were approximated using skin staples. The  wound was then cleaned and dried and Dermabond advanced, tissue adhesive  applied followed by an occlusive dressing. The patient was then  transferred into the prone position for Dr. Vic Valero to complete the  sentinel node biopsy. SPECIMENS REMOVED:  Melanoma, right back. APPLIANCES LEFT IN PLACE:  None. ESTIMATED BLOOD LOSS:  Minimal.    COMPLICATIONS:  None. FOLLOWUP:  In my office in one week.       Marlen Colbert MD    D: 06/22/2021 14:25:20       T: 06/22/2021 14:30:17     [email or web address]  Job#: [id number]     Doc#: [id number]    CC:   _____

## 2021-06-28 ENCOUNTER — OFFICE VISIT (OUTPATIENT)
Dept: ONCOLOGY | Age: 76
End: 2021-06-28

## 2021-06-28 ENCOUNTER — HOSPITAL ENCOUNTER (OUTPATIENT)
Dept: INFUSION THERAPY | Age: 76
End: 2021-06-28
Payer: MEDICARE

## 2021-06-28 VITALS
HEART RATE: 106 BPM | OXYGEN SATURATION: 94 % | BODY MASS INDEX: 21.37 KG/M2 | HEIGHT: 68 IN | WEIGHT: 141 LBS | RESPIRATION RATE: 16 BRPM | DIASTOLIC BLOOD PRESSURE: 65 MMHG | SYSTOLIC BLOOD PRESSURE: 111 MMHG | TEMPERATURE: 97.4 F

## 2021-06-28 DIAGNOSIS — C43.59 MALIGNANT MELANOMA OF TORSO EXCLUDING BREAST (HCC): Primary | ICD-10-CM

## 2021-06-28 PROCEDURE — 99999 PR OFFICE/OUTPT VISIT,PROCEDURE ONLY: CPT | Performed by: INTERNAL MEDICINE

## 2021-06-28 ASSESSMENT — PATIENT HEALTH QUESTIONNAIRE - PHQ9
SUM OF ALL RESPONSES TO PHQ QUESTIONS 1-9: 0
SUM OF ALL RESPONSES TO PHQ QUESTIONS 1-9: 0
1. LITTLE INTEREST OR PLEASURE IN DOING THINGS: 0
2. FEELING DOWN, DEPRESSED OR HOPELESS: 0
SUM OF ALL RESPONSES TO PHQ9 QUESTIONS 1 & 2: 0
SUM OF ALL RESPONSES TO PHQ QUESTIONS 1-9: 0

## 2021-06-28 NOTE — PROGRESS NOTES
Patient Name:  Puma Fowler  Patient :  1945  Patient MRN:  [id number]     Primary Oncologist: Trenton Morel MD  Referring Provider: Caesar Fine MD     Date of Service: 2021         Chief Complaint:    No chief complaint on file. He came in for follow up visit. Patient Active Problem List:     Chronic kidney disease (CKD), stage IV (severe)      Right inguinal hernia     Urinary tract infection due to extended-spectrum beta lactamase (ESBL) producing Escherichia coli     Hemodialysis access site with mature fistula      HPI:   Davida Park is a pleasant 60-year-old male patient who was referred for evaluation of melanoma. He went to Dr Rebecca Ortega for evaluation of mole to right back and had biopsy on May 19, 2021       He has red hair. He was referred to Dr Mikael Diego for wide excision and sentinel lymph node biopsy. We went over NCCN guideline. He has pathological stage IIA. We discussed about observation vs clinical trial. I will discuss with clinical research nurse. I discussed about surveillance. No routine labs or imaging study were recommended. I recommend to follow up with me after surgery. We discussed about using of sunscreen. He has ESRD and is on HD TIW.     Past Medical History:  Past Medical History:   Diagnosis Date    Abnormal urine     abn urine culture 4/3/2017- OR for 2017 cancelled-rescheduled for 2017- ( on 2017 pt states finishes antibiotic today and had repeat urine culture at office last week)    Chronic kidney disease, stage 4, severely decreased GFR ([address] 2016    Dialysis on Mon-Wed-Fri    follows with Dr Issa Flores Nunez catheter in place     Has had in place since 16    Hearing deficit     both ears --no hearing aids    Hemod[facility] patient [facility])     Sees Dr Mu Matos    Dialysis on Mon-Wed-Fri    History of blood transfusion     Hypertension     Follows with Dr. Issa Flores Inguinal hernia     Missing teeth, acquired     upper and lower some broken off now    Wears glasses     for reading      Past Surgery History:     DIALYSIS FISTULA CREATION 11/29/2016 Left upper arm   HERNIA REPAIR Right 11/29/2016 INGUINAL HERNIA   PROSTATE SURGERY 04/2017 for BPH  Colonoscopy in 2019. Social History:  He smoked 2 PPD for 19 years and quit in 1995. Denied EtOH or illicit drug use. He has 3 children. Family History  No cancer in the family. Review of Systems: The remainder of ROS is unremarkable. Vital Signs: There were no vitals taken for this visit. Physical Exam:  CONSTITUTIONAL: awake, alert, cooperative, no apparent distress   EYES: pupils equal, round and reactive to light, sclera clear and conjunctiva normal  ENT: Normocephalic, without obvious abnormality, atraumatic  NECK: supple, symmetrical, no jugular venous distension and no carotid bruits   HEMATOLOGIC/LYMPHATIC: no cervical, supraclavicular or axillary lymphadenopathy   LUNGS: no increased work of breathing and clear to auscultation   CARDIOVASCULAR: regular rate and rhythm, normal S1 and S2, no murmur noted  ABDOMEN: normal bowel sounds x 4, soft, non-distended, non-tender, no masses palpated, no hepatosplenomegaly   MUSCULOSKELETAL: full range of motion noted, tone is normal  NEUROLOGIC: awake, alert, oriented to name, place and time. Motor skills grossly intact. SKIN: No jaundice. Healing scar from recent biopsy to right back  EXTREMITIES: no LE edema     Labs:  Hematology:  Lab Results   Component Value Date    WBC 9.5 06/22/2021    RBC 4.28 (L) 06/22/2021    HGB 12.5 (L) 06/22/2021    HCT 39.0 (L) 06/22/2021    MCV 91.1 06/22/2021    MCH 29.2 06/22/2021    MCHC 32.1 06/22/2021    RDW 13.0 06/22/2021     06/22/2021    MPV 10.0 06/22/2021    SEGSPCT 68.9 (H) 06/22/2021    EOSRELPCT 1.8 06/22/2021    BASOPCT 0.5 06/22/2021    LYMPHOPCT 21.6 (L) 06/22/2021    MONOPCT 6.9 (H) 06/22/2021    SEGSABS 6.5 06/22/2021    EOSABS 0.2 06/22/2021    BASOSABS 0.1 06/22/2021 LYMPHSABS 2.0 06/22/2021    MONOSABS 0.7 06/22/2021    DIFFTYPE AUTOMATED DIFFERENTIAL 06/22/2021     Chemistry:  Lab Results   Component Value Date     06/22/2021    K 4.7 06/22/2021     06/22/2021    CO2 28 06/22/2021    BUN 42 (H) 06/22/2021    CREATININE 5.5 (H) 06/22/2021    GLUCOSE 90 06/22/2021    CALCIUM 9.3 06/22/2021    PROT 6.8 09/05/2019    LABALBU 4.2 09/05/2019    BILITOT 0.3 09/05/2019    ALKPHOS 102 09/05/2019    AST 17 09/05/2019    ALT 10 09/05/2019    LABGLOM 10 (L) 06/22/2021    GFRAA 12 (L) 06/22/2021     Immunology:  Lab Results   Component Value Date    PROT 6.8 09/05/2019     Coagulation Panel:  Lab Results   Component Value Date    PROTIME 10.8 08/31/2017    INR 0.95 08/31/2017    APTT 29.5 08/31/2017    DDIMER 223 04/15/2011     Tumor Markers:  Lab Results   Component Value Date    PSA 6.5 (H) 02/15/2019        Observations:  PHQ-9 Total Score: 0 (6/28/2021 11:14 AM)       Assessment & Plan:  1. 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 He is scheduled for wide excision and sentinel lymph node sampling. We discussed about observation and clinical trial.  We went over NCCN guideline and discuss about surveillance. 2. I recommend to use sunscreen. I advise to keep age appropriate cancer screening up-to-date. RTC after surgery. All of his questions have been answered for today.     Electronically signed by Abdifatah Arias MD on 5/27/21 at 7:28 AM EDT

## 2021-06-28 NOTE — PROGRESS NOTES
MA Rooming Questions  Patient: Author Preston  MRN: [id number]    Date: 6/28/2021        1. Do you have any new issues?   no         2. Do you need any refills on medications?    no    3. Have you had any imaging done since your last visit?   no    4. Have you been hospitalized or seen in the emergency room since your last visit here?   no    5. Did the patient have a depression screening completed today?  Yes    PHQ-9 Total Score: 0 (6/28/2021 11:14 AM)       PHQ-9 Given to (if applicable):               PHQ-9 Score (if applicable):                     [] Positive     []  Negative              Does question #9 need addressed (if applicable)                     [] Yes    []  No               Rashard Fatima CMA

## 2021-06-30 ENCOUNTER — HOSPITAL ENCOUNTER (OUTPATIENT)
Dept: INFUSION THERAPY | Age: 76
Discharge: HOME | End: 2021-06-30
Payer: MEDICARE

## 2021-06-30 ENCOUNTER — OFFICE VISIT (OUTPATIENT)
Dept: ONCOLOGY | Age: 76
End: 2021-06-30
Payer: MEDICARE

## 2021-06-30 VITALS
SYSTOLIC BLOOD PRESSURE: 130 MMHG | DIASTOLIC BLOOD PRESSURE: 69 MMHG | HEIGHT: 68 IN | HEART RATE: 74 BPM | BODY MASS INDEX: 21.64 KG/M2 | WEIGHT: 142.8 LBS | RESPIRATION RATE: 16 BRPM | OXYGEN SATURATION: 97 % | TEMPERATURE: 97.2 F

## 2021-06-30 DIAGNOSIS — C43.59 MALIGNANT MELANOMA OF TORSO EXCLUDING BREAST (HCC): Primary | ICD-10-CM

## 2021-06-30 DIAGNOSIS — N18.4 CHRONIC KIDNEY DISEASE (CKD), STAGE IV (SEVERE) (HCC): ICD-10-CM

## 2021-06-30 DIAGNOSIS — Z99.2 HEMODIALYSIS ACCESS SITE WITH MATURE FISTULA (HCC): ICD-10-CM

## 2021-06-30 PROCEDURE — 1036F TOBACCO NON-USER: CPT | Performed by: NURSE PRACTITIONER

## 2021-06-30 PROCEDURE — G8427 DOCREV CUR MEDS BY ELIG CLIN: HCPCS | Performed by: NURSE PRACTITIONER

## 2021-06-30 PROCEDURE — 1123F ACP DISCUSS/DSCN MKR DOCD: CPT | Performed by: NURSE PRACTITIONER

## 2021-06-30 PROCEDURE — 3017F COLORECTAL CA SCREEN DOC REV: CPT | Performed by: NURSE PRACTITIONER

## 2021-06-30 PROCEDURE — 99214 OFFICE O/P EST MOD 30 MIN: CPT | Performed by: NURSE PRACTITIONER

## 2021-06-30 PROCEDURE — 99211 OFF/OP EST MAY X REQ PHY/QHP: CPT

## 2021-06-30 PROCEDURE — 4040F PNEUMOC VAC/ADMIN/RCVD: CPT | Performed by: NURSE PRACTITIONER

## 2021-06-30 PROCEDURE — G8420 CALC BMI NORM PARAMETERS: HCPCS | Performed by: NURSE PRACTITIONER

## 2021-06-30 ASSESSMENT — PATIENT HEALTH QUESTIONNAIRE - PHQ9
SUM OF ALL RESPONSES TO PHQ9 QUESTIONS 1 & 2: 0
SUM OF ALL RESPONSES TO PHQ QUESTIONS 1-9: 0
SUM OF ALL RESPONSES TO PHQ QUESTIONS 1-9: 0
2. FEELING DOWN, DEPRESSED OR HOPELESS: 0
1. LITTLE INTEREST OR PLEASURE IN DOING THINGS: 0
SUM OF ALL RESPONSES TO PHQ QUESTIONS 1-9: 0

## 2021-06-30 NOTE — PROGRESS NOTES
MA Rooming Questions  Patient: Keren Altamirano  MRN: [id number]    Date: 6/30/2021        1. Do you have any new issues?   no         2. Do you need any refills on medications?    no    3. Have you had any imaging done since your last visit?   no    4. Have you been hospitalized or seen in the emergency room since your last visit here?   no    5. Did the patient have a depression screening completed today?  Yes    PHQ-9 Total Score: 0 (6/30/2021 11:53 AM)       PHQ-9 Given to (if applicable):               PHQ-9 Score (if applicable):                     [] Positive     []  Negative              Does question #9 need addressed (if applicable)                     [] Yes    []  No               Erling Sicard, CMA

## 2021-06-30 NOTE — PROGRESS NOTES
Patient Name:  Lior Weldon  Patient :  1945  Patient MRN:  [id number]     Primary Oncologist: Asiya Guerrero MD  Referring Provider: Landy Cabrales MD     Date of Service: 2021         Chief Complaint:    Chief Complaint   Patient presents with   [address]      He came in for follow up visit. Patient Active Problem List:     Chronic kidney disease (CKD), stage IV (severe)      Right inguinal hernia     Urinary tract infection due to extended-spectrum beta lactamase (ESBL) producing Escherichia coli     Hemodialysis access site with mature fistula      HPI:   Luz Tee is a pleasant 32-year-old male patient who was referred for evaluation of melanoma. He went to Dr Ming Serna for evaluation of mole to right back and had biopsy on May 19, 2021       He has red hair. He was referred to Dr Alexandra Tay for wide excision and sentinel lymph node biopsy. We went over NCCN guideline. He has pathological stage IIA. Presented on 2021 for scheduled follow up. He had wide excision 21 by Dr. Ming Serna site has staples in place and appears to be healing well. Sentinal lymph node biopsy by Dr. Alexandra Tay. He has follow up with Dr. Ming Serna this Friday and Dr. Alexandra Tay next Wednesday. We reviewed pathology-    Reviewed guidelines for follow-up including   H&P every 6 to 12 months for 5 years including emphasis on nodes and skin, then annually as indicated   no routine blood tests  routine imaging on screen for asymptomatic recurrence or metastatic disease is not recommended  imaging is indicated to investigate specific signs and symptoms    Encouraged to be diligent about sunscreen, we reviewed signs and symptoms of recurrence. Continues on hemodialysis 3 times a week.         Past Medical History:  Past Medical History:   Diagnosis Date    Abnormal urine     abn urine culture 4/3/2017- OR for 2017 cancelled-rescheduled for 2017- ( on 2017 pt states finishes antibiotic today and had repeat urine culture at office last week)    Chronic kidney disease, stage 4, severely decreased GFR ([address] 09/2016    Dialysis on Mon-Wed-Fri    follows with Dr Yosef Lopez Nunez catheter in place     Has had in place since 9/29/16    Hearing deficit     both ears --no hearing aids    [facility]od[facility])     Sees Dr Lacy Alva    Dialysis on Mon-Wed-Fri    History of blood transfusion     Hypertension     Follows with Dr. Yosef Lopez Inguinal hernia     Missing teeth, acquired     upper and lower  some broken off now    Wears glasses     for reading      Past Surgery History:     DIALYSIS FISTULA CREATION 11/29/2016 Left upper arm   HERNIA REPAIR Right 11/29/2016 INGUINAL HERNIA   PROSTATE SURGERY 04/2017 for BPH  Colonoscopy in 2019. Social History:  He smoked 2 PPD for 19 years and quit in 1995. Denied EtOH or illicit drug use. He has 3 children. Family History  No cancer in the family. Review of Systems: The remainder of ROS is unremarkable.      Vital Signs: /69 (Site: Right Upper Arm, Position: Sitting, Cuff Size: Medium Adult)   Pulse 74   Temp 97.2 °F (36.2 °C) (Infrared)   Resp 16   Ht 5' 8\" (1.727 m)   Wt 142 lb 12.8 oz (64.8 kg)   SpO2 97%   BMI 21.71 kg/m²      Physical Exam:  CONSTITUTIONAL: awake, alert, cooperative, no apparent distress   EYES: pupils equal, round and reactive to light, sclera clear and conjunctiva normal  ENT: Normocephalic, without obvious abnormality, atraumatic  NECK: supple, symmetrical, no jugular venous distension and no carotid bruits   HEMATOLOGIC/LYMPHATIC: no cervical, supraclavicular or axillary lymphadenopathy   LUNGS: no increased work of breathing and clear to auscultation   CARDIOVASCULAR: regular rate and rhythm, normal S1 and S2, + murmur noted  ABDOMEN: normal bowel sounds x 4, soft, non-distended, non-tender, no masses palpated, no hepatosplenomegaly   MUSCULOSKELETAL: full range of motion noted, tone is normal  NEUROLOGIC: awake, alert, oriented to name, place and time. Motor skills grossly intact. SKIN: No jaundice. Healing scar from recent biopsy to right back  EXTREMITIES: no LE edema; LUE fistula w/bruit/thrill    Labs:  Hematology:  Lab Results   Component Value Date    WBC 9.5 06/22/2021    RBC 4.28 (L) 06/22/2021    HGB 12.5 (L) 06/22/2021    HCT 39.0 (L) 06/22/2021    MCV 91.1 06/22/2021    MCH 29.2 06/22/2021    MCHC 32.1 06/22/2021    RDW 13.0 06/22/2021     06/22/2021    MPV 10.0 06/22/2021    SEGSPCT 68.9 (H) 06/22/2021    EOSRELPCT 1.8 06/22/2021    BASOPCT 0.5 06/22/2021    LYMPHOPCT 21.6 (L) 06/22/2021    MONOPCT 6.9 (H) 06/22/2021    SEGSABS 6.5 06/22/2021    EOSABS 0.2 06/22/2021    BASOSABS 0.1 06/22/2021    LYMPHSABS 2.0 06/22/2021    MONOSABS 0.7 06/22/2021    DIFFTYPE AUTOMATED DIFFERENTIAL 06/22/2021     Chemistry:  Lab Results   Component Value Date     06/22/2021    K 4.7 06/22/2021     06/22/2021    CO2 28 06/22/2021    BUN 42 (H) 06/22/2021    CREATININE 5.5 (H) 06/22/2021    GLUCOSE 90 06/22/2021    CALCIUM 9.3 06/22/2021    PROT 6.8 09/05/2019    LABALBU 4.2 09/05/2019    BILITOT 0.3 09/05/2019    ALKPHOS 102 09/05/2019    AST 17 09/05/2019    ALT 10 09/05/2019    LABGLOM 10 (L) 06/22/2021    GFRAA 12 (L) 06/22/2021     Immunology:  Lab Results   Component Value Date    PROT 6.8 09/05/2019     Coagulation Panel:  Lab Results   Component Value Date    PROTIME 10.8 08/31/2017    INR 0.95 08/31/2017    APTT 29.5 08/31/2017    DDIMER 223 04/15/2011     Tumor Markers:  Lab Results   Component Value Date    PSA 6.5 (H) 02/15/2019        Observations:  PHQ-9 Total Score: 0 (6/30/2021 11:53 AM)       Assessment & Plan:  1. 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 He is scheduled for wide excision and sentinel lymph node sampling. We discussed about observation and clinical trial.  We went over NCCN guideline and discuss about surveillance.     2. I recommend to use sunscreen. I advise to keep age appropriate cancer screening up-to-date. RTC after 6 months. All of his questions have been answered for today.

## 2021-11-30 NOTE — PROGRESS NOTES
Patient Name:  Dayan Ayoub  Patient :  1945  Patient MRN:  [id number]     Primary Oncologist: Marco Antonio Laird MD  Referring Provider: Liz Bustamante MD     Date of Service: 2021         Chief Complaint:    Chief Complaint   Patient presents with    Follow-up      He came in for follow up visit. Patient Active Problem List:     Chronic kidney disease (CKD), stage IV (severe)      Right inguinal hernia     Urinary tract infection due to extended-spectrum beta lactamase (ESBL) producing Escherichia coli     Hemodialysis access site with mature fistula      HPI:   Patrica Estrella is a pleasant 68-year-old male patient who was referred for evaluation of melanoma. He went to Dr Faby Hawk for evaluation of mole to right back and had biopsy on May 19, 2021       He has red hair. He was referred to Dr Cleo Duenas for wide excision and sentinel lymph node biopsy. We went over NCCN guideline. He has pathological stage IIA. He had wide excision 21 by Dr. Faby Hawk site has staples in place and appears to be healing well. Sentinal lymph node biopsy by Dr. Cleo Duenas. Reviewed guidelines for follow-up including :  H&P every 6 to 12 months for 5 years including emphasis on nodes and skin, then annually as indicated no routine blood tests. routine imaging on screen for asymptomatic recurrence or metastatic disease is not recommended  imaging is indicated to investigate specific signs and symptoms    On 2021 she came in for follow-up visit. On hemodialysis 3 times a week. He has history of elevated PSA and prostate biopsy by Dr. Supriay Pham. I encouraged him to follow-up with urologist.  He continues to follow-up with Dr. Faby Hawk. No acute pain. Denied any nausea, vomiting or diarrhea. No fever or chills. No chest pain, shortness of breath or palpitation. No headache or dizzy spell. No specific bone pain. No melena or hematochezia. Denied any dysuria or hematuria.     Past Medical History:  Past Medical History:   Diagnosis Date    Abnormal urine     abn urine culture 4/3/2017- OR for 4/13/2017 cancelled-rescheduled for 4/25/2017- ( on 4/24/2017 pt states finishes antibiotic today and had repeat urine culture at office last week)    Chronic kidney disease, stage 4, severely decreased GFR ([address] 09/2016    Dialysis on Mon-Wed-Fri    follows with Dr Wesly Bertrand Nunez catheter in place     Has had in place since 9/29/16    Hearing deficit     both ears --no hearing aids    [facility]od[facility])     Sees Dr Gibson Conley    Dialysis on Mon-Wed-Fri    History of blood transfusion     Hypertension     Follows with Dr. Wesly Bertrand Inguinal hernia     Missing teeth, acquired     upper and lower  some broken off now    Wears glasses     for reading      Past Surgery History:     DIALYSIS FISTULA CREATION 11/29/2016 Left upper arm   HERNIA REPAIR Right 11/29/2016 INGUINAL HERNIA   PROSTATE SURGERY 04/2017 for BPH  Colonoscopy in 2019. Social History:  He smoked 2 PPD for 19 years and quit in 1995. Denied EtOH or illicit drug use. He has 3 children. Family History  No cancer in the family. Review of Systems: The remainder of ROS is unremarkable.      Vital Signs: BP (!) 166/75 (Site: Right Upper Arm, Position: Sitting, Cuff Size: Medium Adult)   Pulse 78   Temp 97.5 °F (36.4 °C) (Temporal)   Resp 16   Ht 5' 8\" (1.727 m)   Wt 141 lb 6.4 oz (64.1 kg)   SpO2 97%   BMI 21.50 kg/m²      Physical Exam:  CONSTITUTIONAL: awake, alert, cooperative, no apparent distress   EYES: pupils equal, round and reactive to light, sclera clear and conjunctiva normal  ENT: Normocephalic, without obvious abnormality, atraumatic  NECK: supple, symmetrical, no jugular venous distension and no carotid bruits   HEMATOLOGIC/LYMPHATIC: no cervical, supraclavicular or axillary lymphadenopathy   LUNGS: no increased work of breathing and clear to auscultation   CARDIOVASCULAR: regular rate and rhythm, normal S1 and S2, + murmur  ABDOMEN: normal bowel sound, soft, non-distended, non-tender, no masses palpated, no hepatosplenomegaly   MUSCULOSKELETAL: full range of motion noted, tone is normal  NEUROLOGIC: awake, alert, oriented to name, place and time. Motor is grossly intact. CN II through XII are grossly intact. SKIN: No jaundice. Healing scar from biopsy to right back  EXTREMITIES: no LE edema. LUE fistula w/bruit/thrill    Labs:  Hematology:  Lab Results   Component Value Date    WBC 9.5 06/22/2021    RBC 4.28 (L) 06/22/2021    HGB 12.5 (L) 06/22/2021    HCT 39.0 (L) 06/22/2021    MCV 91.1 06/22/2021    MCH 29.2 06/22/2021    MCHC 32.1 06/22/2021    RDW 13.0 06/22/2021     06/22/2021    MPV 10.0 06/22/2021    SEGSPCT 68.9 (H) 06/22/2021    EOSRELPCT 1.8 06/22/2021    BASOPCT 0.5 06/22/2021    LYMPHOPCT 21.6 (L) 06/22/2021    MONOPCT 6.9 (H) 06/22/2021    SEGSABS 6.5 06/22/2021    EOSABS 0.2 06/22/2021    BASOSABS 0.1 06/22/2021    LYMPHSABS 2.0 06/22/2021    MONOSABS 0.7 06/22/2021    DIFFTYPE AUTOMATED DIFFERENTIAL 06/22/2021     Chemistry:  Lab Results   Component Value Date     06/22/2021    K 4.7 06/22/2021     06/22/2021    CO2 28 06/22/2021    BUN 42 (H) 06/22/2021    CREATININE 5.5 (H) 06/22/2021    GLUCOSE 90 06/22/2021    CALCIUM 9.3 06/22/2021    PROT 6.8 09/05/2019    LABALBU 4.2 09/05/2019    BILITOT 0.3 09/05/2019    ALKPHOS 102 09/05/2019    AST 17 09/05/2019    ALT 10 09/05/2019    LABGLOM 10 (L) 06/22/2021    GFRAA 12 (L) 06/22/2021     Immunology:  Lab Results   Component Value Date    PROT 6.8 09/05/2019     Coagulation Panel:  Lab Results   Component Value Date    PROTIME 10.8 08/31/2017    INR 0.95 08/31/2017    APTT 29.5 08/31/2017    DDIMER 223 04/15/2011     Tumor Markers:  Lab Results   Component Value Date    PSA 6.5 (H) 02/15/2019      Observations:  No data recorded    Assessment & Plan:  1. 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   He had wide excision with sentinel lymph node biopsy in June 2021. He will continue with active surveillance. He continues to follow-up with dermatologist.    2. I recommend to use sunscreen. 3.  Encouraged him to follow-up with urologist for history of elevated PSA and prostate biopsy. Reportedly there was no prostate cancer. He denied any symptoms to suggest prostate cancer. I advise to keep age appropriate cancer screening up-to-date. RTC after 6 months. All of his questions have been answered for today.

## 2021-12-30 ENCOUNTER — OFFICE VISIT (OUTPATIENT)
Dept: ONCOLOGY | Age: 76
End: 2021-12-30
Payer: MEDICARE

## 2021-12-30 ENCOUNTER — HOSPITAL ENCOUNTER (OUTPATIENT)
Dept: INFUSION THERAPY | Age: 76
Discharge: HOME | End: 2021-12-30
Payer: MEDICARE

## 2021-12-30 VITALS
HEART RATE: 78 BPM | BODY MASS INDEX: 21.43 KG/M2 | DIASTOLIC BLOOD PRESSURE: 75 MMHG | WEIGHT: 141.4 LBS | SYSTOLIC BLOOD PRESSURE: 166 MMHG | RESPIRATION RATE: 16 BRPM | OXYGEN SATURATION: 97 % | HEIGHT: 68 IN | TEMPERATURE: 97.5 F

## 2021-12-30 DIAGNOSIS — C43.59 MALIGNANT MELANOMA OF TORSO EXCLUDING BREAST (HCC): Primary | ICD-10-CM

## 2021-12-30 PROCEDURE — 99213 OFFICE O/P EST LOW 20 MIN: CPT | Performed by: INTERNAL MEDICINE

## 2021-12-30 PROCEDURE — 1123F ACP DISCUSS/DSCN MKR DOCD: CPT | Performed by: INTERNAL MEDICINE

## 2021-12-30 PROCEDURE — 4040F PNEUMOC VAC/ADMIN/RCVD: CPT | Performed by: INTERNAL MEDICINE

## 2021-12-30 PROCEDURE — 99211 OFF/OP EST MAY X REQ PHY/QHP: CPT

## 2021-12-30 PROCEDURE — 3017F COLORECTAL CA SCREEN DOC REV: CPT | Performed by: INTERNAL MEDICINE

## 2021-12-30 PROCEDURE — G8420 CALC BMI NORM PARAMETERS: HCPCS | Performed by: INTERNAL MEDICINE

## 2021-12-30 PROCEDURE — G8427 DOCREV CUR MEDS BY ELIG CLIN: HCPCS | Performed by: INTERNAL MEDICINE

## 2021-12-30 PROCEDURE — 1036F TOBACCO NON-USER: CPT | Performed by: INTERNAL MEDICINE

## 2021-12-30 PROCEDURE — G8484 FLU IMMUNIZE NO ADMIN: HCPCS | Performed by: INTERNAL MEDICINE

## 2021-12-30 NOTE — PROGRESS NOTES
MA Rooming Questions  Patient: Rosario James  MRN: [id number]    Date: 12/30/2021        1. Do you have any new issues?   no         2. Do you need any refills on medications?    no    3. Have you had any imaging done since your last visit?   no    4. Have you been hospitalized or seen in the emergency room since your last visit here?   no    5. Did the patient have a depression screening completed today?  No    No data recorded     PHQ-9 Given to (if applicable):               PHQ-9 Score (if applicable):                     [] Positive     []  Negative              Does question #9 need addressed (if applicable)                     [] Yes    []  No               [place] YOSELYN Paiz

## 2022-07-14 NOTE — PROGRESS NOTES
.IR Procedure at [facility]:  Left patient a message to arrive at 0930 on 7/19/2022 for his procedure at 1130. Also went over below instructions and for patient to check with his Dr with any questions concerning medications. NPO at [address] [place] [address] Avenue       2. Follow your directions as prescribed by the doctor for your procedure and medications. 3.   Consult your provider as to when to stop blood thinner  4. Do not take any pain medication within 6 hours of your procedure  5. Do not drink any alcoholic beverages or use any street drugs 24 hours before procedure. 6.   Please wear simple, loose fitting clothing to the hospital.  Do not bring valuables (money,             credit cards, checkbooks, etc.)     7. If you  have a Living Will and Durable Power of  for Healthcare, please bring in a copy. 8.   Please bring picture ID,  insurance card, paperwork from the doctors office            (H & P, Consent,  & card for implantable devices). 9.   Report to the information desk on the ground floor. 10. Take a shower the night before or morning of your procedure, do not apply any lotion, oil or powder. 11. If you are going to be sedated for the procedure, you will need a responsible adult to drive you home. Spoke with patient and he will arrive at 0930 at [facility] on 7/19/2022 for his procedure at 1130, also went over instructions and patient voiced understanding.

## 2022-07-19 ENCOUNTER — HOSPITAL ENCOUNTER (OUTPATIENT)
Dept: INTERVENTIONAL RADIOLOGY/VASCULAR | Age: 77
Discharge: HOME | End: 2022-07-19
Payer: MEDICARE

## 2022-07-19 VITALS
RESPIRATION RATE: 16 BRPM | OXYGEN SATURATION: 100 % | WEIGHT: 142 LBS | SYSTOLIC BLOOD PRESSURE: 162 MMHG | DIASTOLIC BLOOD PRESSURE: 76 MMHG | BODY MASS INDEX: 21.52 KG/M2 | TEMPERATURE: 97.5 F | HEIGHT: 68 IN | HEART RATE: 71 BPM

## 2022-07-19 DIAGNOSIS — N18.6 ESRD (END STAGE RENAL DISEASE) (HCC): ICD-10-CM

## 2022-07-19 LAB
APTT: 30.9 SECONDS (ref 25.1–37.1)
HCT VFR BLD CALC: 37.3 % (ref 42–52)
HEMOGLOBIN: 12.5 GM/DL (ref 13.5–18)
INR BLD: 0.88 INDEX
MCH RBC QN AUTO: 30 PG (ref 27–31)
MCHC RBC AUTO-ENTMCNC: 33.5 % (ref 32–36)
MCV RBC AUTO: 89.4 FL (ref 78–100)
PDW BLD-RTO: 12.8 % (ref 11.7–14.9)
PLATELET # BLD: 199 K/CU MM (ref 140–440)
PMV BLD AUTO: 10 FL (ref 7.5–11.1)
POTASSIUM SERPL-SCNC: 4.5 MMOL/L (ref 3.5–5.1)
PROTHROMBIN TIME: 11.4 SECONDS (ref 11.7–14.5)
RBC # BLD: 4.17 M/CU MM (ref 4.6–6.2)
WBC # BLD: 10.1 K/CU MM (ref 4–10.5)

## 2022-07-19 PROCEDURE — 85610 PROTHROMBIN TIME: CPT

## 2022-07-19 PROCEDURE — 36902 INTRO CATH DIALYSIS CIRCUIT: CPT

## 2022-07-19 PROCEDURE — 6360000002 HC RX W HCPCS: Performed by: RADIOLOGY

## 2022-07-19 PROCEDURE — 36907 BALO ANGIOP CTR DIALYSIS SEG: CPT

## 2022-07-19 PROCEDURE — 85027 COMPLETE CBC AUTOMATED: CPT

## 2022-07-19 PROCEDURE — 2580000003 HC RX 258: Performed by: RADIOLOGY

## 2022-07-19 PROCEDURE — 84132 ASSAY OF SERUM POTASSIUM: CPT

## 2022-07-19 PROCEDURE — 7100000010 HC PHASE II RECOVERY - FIRST 15 MIN

## 2022-07-19 PROCEDURE — 85730 THROMBOPLASTIN TIME PARTIAL: CPT

## 2022-07-19 RX ORDER — FENTANYL CITRATE 50 UG/ML
25 INJECTION, SOLUTION INTRAMUSCULAR; INTRAVENOUS ONCE
Status: COMPLETED | OUTPATIENT
Start: 2022-07-19 | End: 2022-07-19

## 2022-07-19 RX ORDER — SODIUM CHLORIDE 0.9 % (FLUSH) 0.9 %
10 SYRINGE (ML) INJECTION PRN
Status: DISCONTINUED | OUTPATIENT
Start: 2022-07-19 | End: 2022-07-20 | Stop reason: HOSPADM

## 2022-07-19 RX ORDER — MIDAZOLAM HYDROCHLORIDE 2 MG/2ML
0.5 INJECTION, SOLUTION INTRAMUSCULAR; INTRAVENOUS ONCE
Status: COMPLETED | OUTPATIENT
Start: 2022-07-19 | End: 2022-07-19

## 2022-07-19 RX ORDER — FENTANYL CITRATE 50 UG/ML
INJECTION, SOLUTION INTRAMUSCULAR; INTRAVENOUS
Status: COMPLETED | OUTPATIENT
Start: 2022-07-19 | End: 2022-07-19

## 2022-07-19 RX ORDER — MIDAZOLAM HYDROCHLORIDE 2 MG/2ML
INJECTION, SOLUTION INTRAMUSCULAR; INTRAVENOUS
Status: COMPLETED | OUTPATIENT
Start: 2022-07-19 | End: 2022-07-19

## 2022-07-19 RX ADMIN — MIDAZOLAM HYDROCHLORIDE 0.5 MG: 1 INJECTION, SOLUTION INTRAMUSCULAR; INTRAVENOUS at 12:28

## 2022-07-19 RX ADMIN — MIDAZOLAM HYDROCHLORIDE 0.5 MG: 1 INJECTION, SOLUTION INTRAMUSCULAR; INTRAVENOUS at 12:25

## 2022-07-19 RX ADMIN — FENTANYL CITRATE 25 MCG: 50 INJECTION, SOLUTION INTRAMUSCULAR; INTRAVENOUS at 12:27

## 2022-07-19 RX ADMIN — FENTANYL CITRATE 25 MCG: 50 INJECTION, SOLUTION INTRAMUSCULAR; INTRAVENOUS at 12:26

## 2022-07-19 RX ADMIN — SODIUM CHLORIDE, PRESERVATIVE FREE 10 ML: 5 INJECTION INTRAVENOUS at 10:18

## 2022-07-19 ASSESSMENT — PAIN - FUNCTIONAL ASSESSMENT: PAIN_FUNCTIONAL_ASSESSMENT: 0-10

## 2022-07-19 ASSESSMENT — PAIN SCALES - GENERAL: PAINLEVEL_OUTOF10: 0

## 2022-07-19 NOTE — OR NURSING
Signed                                  PROCEDURE PERFORMED: Fistulagram by Dr. Lavona Duane     PRIMARY INDICATION FOR PROCEDURE: Blockage      INFORMED CONSENT:  Obtained prior to procedure. Consent placed in chart.     ROBERT SCORE PRE PROCEDURE: 9        PT TRANSPORTED FROM:[facility]                             TO THE IR ROOM:    Large                    ARRIVED TO ROOM: 1200     ASSESSMENT:WNL     STAFF PRESENT: Siddhartha Jackson RN     BARRIER PRECAUTIONS & STERILE TECHNIQUE:               Pt transferred to the table and positioned for comfort. Warm blankets given. Pt placed on Cardiac Monitor.  Pt prepped and draped in a sterile fashion with chlorhexadine.     TIME OUT:  3993     PROCEDURE STARTED: 1223     PAIN/LOCAL ANESTHESIA/SEDATION MANAGEMENT:          Local: Lidocaine 1% given by            Sedation: Given by Tatyana BOLDEN             Fentanyl:25mcg             Versed: 0.5mg     INTRAOPERATIVE:          ACCESS TIME: 1223          US/FLUORO: Both 1 US Image          WIRE USED: 0.035 stiff  180 glidewire          SHEATH USED: 7fr          CATHETER USED:          FINAL IMAGE TAKEN TO CONFIRM PLACEMENT OF:          CONTRAST/CC: Isovue 370: 35cc  Micropuncture introducer set  Kumpe 5fr x 40 cm  Dr. Nikolas Miles stringshea suture access site  Hemostasis at 1308     STERILE DRESSINGS: Guaze and Tegaderm placed by Brandan Nava RTR     SPECIMENS: N/A     EBL:  <1cc     FOLLOW- UP X-RAY: N/A     PROCEDURE ENDED: 0577     COMPLICATIONS:None     ROBERT SCORE POST PROCEDURE:   9       LEFT THE ROOM: 1317     REPORT CALLED TO: BANDAR BOLDEN [facility]

## 2022-07-19 NOTE — PRE SEDATION
Sedation Pre-Procedure Note    Patient Name: Daiana Cadet   YOB: 1945  Room/Bed: Room/bed info not found  Medical Record Number: [id number]  Date: 7/19/2022   Time: 3:29 PM       Indication:  Fistulagram    Consent: I have discussed with the patient and/or the patient representative the indication, alternatives, and the possible risks and/or complications of the planned procedure and the anesthesia methods. The patient and/or patient representative appear to understand and agree to proceed. Vital Signs:   Vitals:    07/19/22 1324   BP: (!) 162/76   Pulse: 71   Resp: 16   Temp:    SpO2: 100%       Past Medical History:   has a past medical history of Abnormal urine, Chronic kidney disease, stage 4, severely decreased GFR ([address] Nunez catheter in place, Hearing deficit, Hemodialysis patient ([address] History of blood transfusion, Hypertension, Inguinal hernia, Missing teeth, acquired, and Wears glasses. Past Surgical History:   has a past surgical history that includes Dialysis fistula creation (Left, 11/29/2016); hernia repair (Right, 11/29/2016); Prostate surgery (04/2017); Axillary Surgery (N/A, 6/22/2021); and Abdomen surgery (Right, 6/22/2021). Medications:   Scheduled Meds:   Continuous Infusions:   PRN Meds: sodium chloride flush, sodium chloride flush  Home Meds:   Prior to Admission medications    Medication Sig Start Date End Date Taking?  Authorizing Provider   tamsulosin (FLOMAX) 0.4 MG capsule TAKE 1 CAPSULE BY MOUTH EVERY DAY 5/11/21   Historical Provider, MD   polyethylene glycol (GLYCOLAX) 17 GM/SCOOP powder MIX 1 CAPFUL IN 8 OZ OF LIQUID AND DRINK BY MOUTH ONCE DAILY AS DIRECTED 5/14/21   Historical Provider, MD   pantoprazole (PROTONIX) 40 MG tablet TAKE 1 TABLET BY MOUTH EVERY DAY 5/11/21   Historical Provider, MD   carvedilol (COREG) 3.125 MG tablet Take 1 tablet by mouth daily 5/11/21   Maria Dolores Rodriguez MD   B Complex-C-Folic Acid (SHEA-ABHISHEK) TABS Take 1 tablet by mouth daily Historical Provider, MD   RENVELA 800 MG tablet 2 Tabs  PO TID WITH MEALS 1/7/17   Historical Provider, MD   finasteride (PROSCAR) 5 MG tablet Take 5 mg by mouth daily  11/2/16   Historical Provider, MD   nitroGLYCERIN (NITRODUR) 0.2 MG/HR Place 1 patch onto the skin every other day  11/2/16   Historical Provider, MD   isosorbide mononitrate (IMDUR) 30 MG extended release tablet 30 mg daily  11/2/16   Historical Provider, MD     Coumadin Use Last 7 Days:  no  Antiplatelet drug therapy use last 7 days: no  Other anticoagulant use last 7 days: no  Additional Medication Information:  none        Pre-Sedation Documentation and Exam:   I have personally completed a history, physical exam & review of systems for this patient (see notes).     Mallampati Airway Assessment:  normal    Prior History of Anesthesia Complications:   none    ASA Classification:  Class 3 - A patient with severe systemic disease that limits activity but is not incapacitating    Sedation/ Anesthesia Plan:   intravenous sedation    Medications Planned:   midazolam (Versed) intravenously and fentanyl intravenously    Patient is an appropriate candidate for plan of sedation: yes    Electronically signed by Kalina Moran MD on 7/19/2022 at 3:29 PM

## 2022-07-19 NOTE — PROGRESS NOTES
1334:  Discharge instructions discussed with patient and daughter, both verbalized an understanding. Pt discharged to home ambulatory with no c/o pain to left upper arm A/V fistula. Positive thrill palpable to left upper arm fistula. Gauze/opsite drsg in place with no bleeding noted.

## 2022-07-19 NOTE — DISCHARGE INSTRUCTIONS
Watch for signs of infection    Elevated temperature, redness or pain at site,   Drainage at incision,

## 2022-07-19 NOTE — PROCEDURES
PROCEDURE PERFORMED: Fistulagram by Dr. Anthony Jessie: Blockage     INFORMED CONSENT:  Obtained prior to procedure. Consent placed in chart. ROBERT SCORE PRE PROCEDURE: 9       PT TRANSPORTED FROM:[facility]                             TO THE IR ROOM:    Large                    ARRIVED TO ROOM: 1200    ASSESSMENT:WNL    STAFF PRESENT: [address] [facility],  [address] Tatyana Jackson RN    BARRIER PRECAUTIONS & STERILE TECHNIQUE:               Pt transferred to the table and positioned for comfort. Warm blankets given. Pt placed on Cardiac Monitor. Pt prepped and draped in a sterile fashion with chlorhexadine.     TIME OUT:  6108    PROCEDURE STARTED: 1223    PAIN/LOCAL ANESTHESIA/SEDATION MANAGEMENT:           Local: Lidocaine 1% given by            Sedation: Given by Tatyana BOLDEN             Fentanyl:25mcg             Versed: 0.5mg    INTRAOPERATIVE:           ACCESS TIME: 1223          US/FLUORO: Both 1 US Image          WIRE USED: 0.035 stiff  180 glidewire          SHEATH USED: 7fr          CATHETER USED:           FINAL IMAGE TAKEN TO CONFIRM PLACEMENT OF:           CONTRAST/CC: Isovue 370: 35cc  Micropuncture introducer set   Kumpe 5fr x 40 cm  Dr. Eliana Dial stringshea suture access site   Hemostasis at 1308    STERILE DRESSINGS: Guaze and Tegaderm placed by Syliva Primas RTR    SPECIMENS: N/A    EBL:  <1cc    FOLLOW- UP X-RAY: N/A    PROCEDURE ENDED: 6449    COMPLICATIONS:None    ROBERT SCORE POST PROCEDURE:   9       LEFT THE ROOM: 1317    REPORT CALLED TO: BANDAR BOLDEN [facility]

## 2022-07-19 NOTE — H&P
Date:2022  Name:Christiano Miller   :1945   #:[id number]    SEX:male   Referring Physician:  Seth Cesar  Primary Physician:  Seth Cesar  Chief Complaint:  elevated intrafistula pressure  History of Present Illness:   elevated intrafistula pressure    HISTORY AND PHYSICAL  ESRD (end stage renal disease) ([facility] [N18.6]    Past Medical History:  Past Medical History:   Diagnosis Date    Abnormal urine     abn urine culture 4/3/2017- OR for 2017 cancelled-rescheduled for 2017- ( on 2017 pt states finishes antibiotic today and had repeat urine culture at office last week)    Chronic kidney disease, stage 4, severely decreased GFR ([facility] 2016    Dialysis on Mon-    follows with Dr Seth Cesar    Nunez catheter in place     Has had in place since 16    Hearing deficit     both ears --no hearing aids    Hemodialysis patient [facility])     Sees Dr Seth Cesar    Dialysis on     History of blood transfusion     Hypertension     Follows with Dr. Seth Cesar    Inguinal hernia     Missing teeth, acquired     upper and lower  some broken off now    Wears glasses     for reading       Past Surgical History:  Past Surgical History:   Procedure Laterality Date    ABDOMEN SURGERY Right 2021    RIGHT BACK EXCISION 5.8 CM MELANOMA (3.1 MM DEPTH) WITH LOCAL FLAP performed by Eileen Hathaway MD at [address] 2021    SENTINEL LYMPHNODE [facility] performed by Chelsea Landry MD at [address] [place] [address] Left 2016    Left upper arm    HERNIA REPAIR Right 2016    INGUINAL HERNIA    PROSTATE SURGERY  2017       Social History:  Social History     Socioeconomic History    Marital status: Single     Spouse name: Not on file    Number of children: Not on file    Years of education: Not on file    Highest education level: Not on file   Occupational History    Not on file   Tobacco Use    Smoking status: Former     Packs/day: 2.00     Years: 30.00     Pack years: 60.00     Types: Cigarettes     Start date: 48     Quit date: 4/3/1992     Years since quittin.3    Smokeless tobacco: Never   Vaping Use    Vaping Use: Never used   Substance and Sexual Activity    Alcohol use: No     Comment: quit 2.5yrs ago--used to drink 3-4 beers daily    Drug use: No    Sexual activity: Not on file   Other Topics Concern    Not on file   Social History Narrative    Not on file     Social Determinants of Health     Financial Resource Strain: Not on file   Food Insecurity: Not on file   Transportation Needs: Not on file   Physical Activity: Not on file   Stress: Not on file   Social Connections: Not on file   Intimate Partner Violence: Not on file   Housing Stability: Not on file       Family History:  Family History   Problem Relation Age of Onset    Diabetes Mother     Diabetes Brother     Heart Disease Brother        Allergies:  No Known Allergies    Medications:  Current Outpatient Medications on File Prior to Encounter   Medication Sig Dispense Refill    tamsulosin (FLOMAX) 0.4 MG capsule TAKE 1 CAPSULE BY MOUTH EVERY DAY      polyethylene glycol (GLYCOLAX) 17 GM/SCOOP powder MIX 1 CAPFUL IN 8 OZ OF LIQUID AND DRINK BY MOUTH ONCE DAILY AS DIRECTED      pantoprazole (PROTONIX) 40 MG tablet TAKE 1 TABLET BY MOUTH EVERY DAY      carvedilol (COREG) 3.125 MG tablet Take 1 tablet by mouth daily 180 tablet 3    B Complex-C-Folic Acid (SHEA-ABHISHEK) TABS Take 1 tablet by mouth daily      RENVELA 800 MG tablet 2 Tabs  PO TID WITH MEALS  2    finasteride (PROSCAR) 5 MG tablet Take 5 mg by mouth daily   0    nitroGLYCERIN (NITRODUR) 0.2 MG/HR Place 1 patch onto the skin every other day   0    isosorbide mononitrate (IMDUR) 30 MG extended release tablet 30 mg daily   0     No current facility-administered medications on file prior to encounter. ROS: No fevers or chills    Vital Signs: Body mass index is 21.59 kg/m².     Laboratory:  Recent Labs     22  1017   WBC 10.1   INR 0.88     INR @LABR24(INR)@    Physical Exam:  GENERAL:Well developed, well nourished in NAD  NECK: Neck exam - No JVD,HJR or carotid bruit, no thyromegaly   RESPIRATORY:Clear to auscultation  HEART:RRR,no murmer, gallop or friction rub  ABDOMEN: Bowel sounds present, no tenderness to palpation. No organomegaly  EXTREMITIES: no edema or cyanosis. LUE cephalic vein fistula with pulsatility  VASCULAR:  no ischemic changes  SKIN: no erythema, rubor or lesions noted  NEURO/PSYCH:Alert and oriented    EKG:    Imaging:    Chest: CTA    Heart: S1, S1    Impression:  Active Problems:    * No active hospital problems. *  Resolved Problems:    * No resolved hospital problems.  *      elevated intrafistula pressure    Mallampati Score 2  ASA class 2    PLAN OF CARE/PLANNED PROCEDURE    IR FISTULAGRAM [08747]

## 2022-07-20 NOTE — PRE SEDATION
Sedation Pre-Procedure Note    Patient Name: Sanket Newsome   YOB: 1945  Room/Bed: Room/bed info not found  Medical Record Number: [id number]  Date: 7/20/2022   Time: 6:45 PM       Indication:  Fistulagram    Consent: I have discussed with the patient and/or the patient representative the indication, alternatives, and the possible risks and/or complications of the planned procedure and the anesthesia methods. The patient and/or patient representative appear to understand and agree to proceed. Vital Signs:   Vitals:    07/19/22 1324   BP: (!) 162/76   Pulse: 71   Resp: 16   Temp:    SpO2: 100%       Past Medical History:   has a past medical history of Abnormal urine, Chronic kidney disease, stage 4, severely decreased GFR ([address] Nunez catheter in place, Hearing deficit, Hemodialysis patient ([facility] History of blood transfusion, Hypertension, Inguinal hernia, Missing teeth, acquired, and Wears glasses. Past Surgical History:   has a past surgical history that includes Dialysis fistula creation (Left, 11/29/2016); hernia repair (Right, 11/29/2016); Prostate surgery (04/2017); Axillary Surgery (N/A, 6/22/2021); and Abdomen surgery (Right, 6/22/2021). Medications:   Scheduled Meds:   Continuous Infusions:   PRN Meds:   Home Meds:   Prior to Admission medications    Medication Sig Start Date End Date Taking?  Authorizing Provider   tamsulosin (FLOMAX) 0.4 MG capsule TAKE 1 CAPSULE BY MOUTH EVERY DAY 5/11/21   Historical Provider, MD   polyethylene glycol (GLYCOLAX) 17 GM/SCOOP powder MIX 1 CAPFUL IN 8 OZ OF LIQUID AND DRINK BY MOUTH ONCE DAILY AS DIRECTED 5/14/21   Historical Provider, MD   pantoprazole (PROTONIX) 40 MG tablet TAKE 1 TABLET BY MOUTH EVERY DAY 5/11/21   Historical Provider, MD   carvedilol (COREG) 3.125 MG tablet Take 1 tablet by mouth daily 5/11/21   Brianna Bunch MD   B Complex-C-Folic Acid (SHEA-ABHISHEK) TABS Take 1 tablet by mouth daily    Historical Provider, MD   RENVELA 800 MG tablet 2 Tabs  PO TID WITH MEALS 1/7/17   Historical Provider, MD   finasteride (PROSCAR) 5 MG tablet Take 5 mg by mouth daily  11/2/16   Historical Provider, MD   nitroGLYCERIN (NITRODUR) 0.2 MG/HR Place 1 patch onto the skin every other day  11/2/16   Historical Provider, MD   isosorbide mononitrate (IMDUR) 30 MG extended release tablet 30 mg daily  11/2/16   Historical Provider, MD     Coumadin Use Last 7 Days:  no  Antiplatelet drug therapy use last 7 days: no  Other anticoagulant use last 7 days: no  Additional Medication Information:  none        Pre-Sedation Documentation and Exam:   I have personally completed a history, physical exam & review of systems for this patient (see notes).     Mallampati Airway Assessment:  normal    Prior History of Anesthesia Complications:   none    ASA Classification:  Class 3 - A patient with severe systemic disease that limits activity but is not incapacitating    Sedation/ Anesthesia Plan:   intravenous sedation    Medications Planned:   midazolam (Versed) intravenously and fentanyl intravenously    Patient is an appropriate candidate for plan of sedation: yes    Electronically signed by Ceferino Velez MD on 7/20/2022 at 6:45 PM

## 2022-09-08 ENCOUNTER — HOSPITAL ENCOUNTER (OUTPATIENT)
Dept: GENERAL RADIOLOGY | Age: 77
Discharge: HOME | End: 2022-09-08
Payer: MEDICARE

## 2022-09-08 ENCOUNTER — HOSPITAL ENCOUNTER (OUTPATIENT)
Age: 77
Discharge: HOME | End: 2022-09-08
Payer: MEDICARE

## 2022-09-08 DIAGNOSIS — Z85.820 HISTORY OF MELANOMA: ICD-10-CM

## 2022-09-08 PROCEDURE — 71046 X-RAY EXAM CHEST 2 VIEWS: CPT

## 2022-09-30 ENCOUNTER — HOSPITAL ENCOUNTER (OUTPATIENT)
Dept: CT IMAGING | Age: 77
Discharge: HOME | End: 2022-09-30
Payer: MEDICARE

## 2022-09-30 DIAGNOSIS — D38.1 NEOPLASM OF UNCERTAIN BEHAVIOR OF TRACHEA, BRONCHUS, AND LUNG: ICD-10-CM

## 2022-09-30 LAB
GFR AFRICAN AMERICAN: 22 ML/MIN/1.73M2
GFR NON-AFRICAN AMERICAN: 18 ML/MIN/1.73M2
POC CREATININE: 3.3 MG/DL (ref 0.9–1.3)

## 2022-09-30 PROCEDURE — 6360000004 HC RX CONTRAST MEDICATION: Performed by: PLASTIC SURGERY

## 2022-09-30 PROCEDURE — 71260 CT THORAX DX C+: CPT

## 2022-09-30 RX ADMIN — IOPAMIDOL 75 ML: 755 INJECTION, SOLUTION INTRAVENOUS at 13:21

## 2022-10-06 NOTE — PROGRESS NOTES
Patient Name:  Radha Stapleton  Patient :  1945  Patient MRN:  [id number]     Primary Oncologist: Michael Altman MD  Referring Provider: Ania Sandra MD     Date of Service: 10/11/2022       Chief Complaint:    Chief Complaint   Patient presents with    Follow-up        He came in for follow up visit. Patient Active Problem List:     Chronic kidney disease (CKD), stage IV (severe)      Right inguinal hernia     Urinary tract infection due to extended-spectrum beta lactamase (ESBL) producing Escherichia coli     Hemodialysis access site with mature fistula      HPI:   Marii Melgoza is a pleasant 81-year-old male patient who was referred for evaluation of melanoma. He went to Dr Navya Segura for evaluation of mole to right back and had biopsy on May 19, 2021       He has red hair. He was referred to Dr Darian Triplett for wide excision and sentinel lymph node biopsy. We went over NCCN guideline. He has pathological stage IIA. He had wide excision 21 by Dr. Navya Segura site has staples in place and appears to be healing well. Sentinal lymph node biopsy by Dr. Darian Triplett. Reviewed guidelines for follow-up including :  H&P every 6 to 12 months for 5 years including emphasis on nodes and skin, then annually as indicated no routine blood tests. routine imaging on screen for asymptomatic recurrence or metastatic disease is not recommended  imaging is indicated to investigate specific signs and symptoms  On hemodialysis 3 times a week. He has history of elevated PSA and prostate biopsy by Dr. Zonia Hernández. I encouraged him to follow-up with urologist.    10/11/2022 he was referred back by Dr Navya Segura. CT chest 2022 ordered by Dr Navya Segura:  1. Pulmonary mass in the right upper lobe. Recommend follow-up PET-CT or tissue sampling. Otherwise, 3 month follow-up CT chest.  2. Centrilobular emphysema with granulomatous changes in the right chest.  No additional nodule. 3. Mediastinal lymphadenopathy.     He has cough since he has flu shot. I prescribed tessalon perle. He has appointment with Dr María Elena Dixon in AM.  I recommend to mention to Dr María Elena Dixon that he has seen me so I can discuss with her. He continues to follow-up with Dr. Navya Segura. No acute pain. Denied any nausea, vomiting or diarrhea. No fever or chills. No chest pain, shortness of breath or palpitation. No headache or dizzy spell. No specific bone pain. No melena or hematochezia. Denied any dysuria or hematuria. Past Medical History:   Diagnosis Date    Abnormal urine     abn urine culture 4/3/2017- OR for 4/13/2017 cancelled-rescheduled for 4/25/2017- ( on 4/24/2017 pt states finishes antibiotic today and had repeat urine culture at office last week)    Chronic kidney disease, stage 4, severely decreased GFR ([facility] [address] 09/2016    Dialysis on Mon-Wed-Fri    follows with Dr Yanique Knox    Nunez catheter in place     Has had in place since 9/29/16    Hearing deficit     both ears --no hearing aids    Hemodialysis patient [facility])     Sees Dr Yanique Knox    Dialysis on Mon-Wed-Fri    History of blood transfusion     Hypertension     Follows with Dr. Yanique Knox    Inguinal hernia     Missing teeth, acquired     upper and lower  some broken off now    Wears glasses     for reading      Past Surgery History:     DIALYSIS FISTULA CREATION 11/29/2016 Left upper arm   HERNIA REPAIR Right 11/29/2016 INGUINAL HERNIA   PROSTATE SURGERY 04/2017 for BPH  Colonoscopy in 2019. Social History:  He smoked 2 PPD for 19 years and quit in 1995. Denied EtOH or illicit drug use. He has 3 children. Family History  No cancer in the family. Review of Systems: The remainder of ROS is unremarkable.      Vital Signs: BP (!) 154/70 (Site: Right Upper Arm, Position: Sitting, Cuff Size: Medium Adult)   Pulse 70   Temp 97.4 °F (36.3 °C) (Temporal)   Resp 16   Ht 5' 8\" (1.727 m)   Wt 143 lb 6.4 oz (65 kg)   SpO2 98%   BMI 21.80 kg/m²      Physical Exam:  CONSTITUTIONAL: awake, alert, cooperative, no apparent distress   EYES: pupils equal, round and reactive to light. Sclera clear and conjunctiva normal  ENT: Normocephalic, without obvious abnormality, atraumatic  NECK: supple, symmetrical, no jugular venous distension and no carotid bruits   HEMATOLOGIC/LYMPHATIC: no cervical, supraclavicular or axillary lymphadenopathy   LUNGS: no increased work of breathing and clear to auscultation   CARDIOVASCULAR: regular rate and rhythm, normal S1 and S2, + murmur  ABDOMEN: normal bowel sound, soft, non-distended, non-tender, no masses palpated, no hepatosplenomegaly   MUSCULOSKELETAL: full range of motion noted, tone is normal  NEUROLOGIC: Motor skills grossly intact. CN II - XII grossly intact. SKIN: No jaundice. Healing scar from biopsy to right back  EXTREMITIES: no LE edema.  LUE fistula w/bruit/thrill    Labs:  Hematology:  Lab Results   Component Value Date    WBC 10.1 07/19/2022    RBC 4.17 (L) 07/19/2022    HGB 12.5 (L) 07/19/2022    HCT 37.3 (L) 07/19/2022    MCV 89.4 07/19/2022    MCH 30.0 07/19/2022    MCHC 33.5 07/19/2022    RDW 12.8 07/19/2022     07/19/2022    MPV 10.0 07/19/2022    SEGSPCT 68.9 (H) 06/22/2021    EOSRELPCT 1.8 06/22/2021    BASOPCT 0.5 06/22/2021    LYMPHOPCT 21.6 (L) 06/22/2021    MONOPCT 6.9 (H) 06/22/2021    SEGSABS 6.5 06/22/2021    EOSABS 0.2 06/22/2021    BASOSABS 0.1 06/22/2021    LYMPHSABS 2.0 06/22/2021    MONOSABS 0.7 06/22/2021    DIFFTYPE AUTOMATED DIFFERENTIAL 06/22/2021     Chemistry:  Lab Results   Component Value Date     06/22/2021    K 4.5 07/19/2022     06/22/2021    CO2 28 06/22/2021    BUN 42 (H) 06/22/2021    CREATININE 3.3 (H) 09/30/2022    GLUCOSE 90 06/22/2021    CALCIUM 9.3 06/22/2021    PROT 6.8 09/05/2019    LABALBU 4.2 09/05/2019    BILITOT 0.3 09/05/2019    ALKPHOS 102 09/05/2019    AST 17 09/05/2019    ALT 10 09/05/2019    LABGLOM 18 (L) 09/30/2022    GFRAA 22 (L) 09/30/2022     Immunology:  Lab Results   Component Value Date    PROT 6.8 09/05/2019     Coagulation Panel:  Lab Results   Component Value Date    PROTIME 11.4 (L) 07/19/2022    INR 0.88 07/19/2022    APTT 30.9 07/19/2022    DDIMER 223 04/15/2011     Tumor Markers:  Lab Results   Component Value Date    PSA 6.5 (H) 02/15/2019      Observations:  No data recorded    Assessment & Plan:  1. 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 He had wide excision with sentinel lymph node biopsy in June 2021. He has follow-up with Dr Kelly Peng I recommend to use sunscreen. 2.  Encouraged him to follow-up with urologist for history of elevated PSA and prostate biopsy. Reportedly there was no prostate cancer. He denied any symptoms to suggest prostate cancer. 3. CT chest in 9/2022 reviewed. He quit smoking in 1995. He will see Dr Mildred Mora and I will discuss with Dr Mildred Mora. Likely he will have EBUS. I prescribed tessalon perle for cough. I advise to keep age appropriate cancer screening up-to-date. RTC in 2 - 3 weeks. All of his questions have been answered for today.

## 2022-10-11 ENCOUNTER — OFFICE VISIT (OUTPATIENT)
Dept: ONCOLOGY | Age: 77
End: 2022-10-11
Payer: MEDICARE

## 2022-10-11 ENCOUNTER — HOSPITAL ENCOUNTER (OUTPATIENT)
Dept: INFUSION THERAPY | Age: 77
Discharge: HOME | End: 2022-10-11
Payer: MEDICARE

## 2022-10-11 VITALS
TEMPERATURE: 97.4 F | WEIGHT: 143.4 LBS | HEART RATE: 70 BPM | RESPIRATION RATE: 16 BRPM | SYSTOLIC BLOOD PRESSURE: 154 MMHG | DIASTOLIC BLOOD PRESSURE: 70 MMHG | BODY MASS INDEX: 21.73 KG/M2 | OXYGEN SATURATION: 98 % | HEIGHT: 68 IN

## 2022-10-11 DIAGNOSIS — C43.59 MALIGNANT MELANOMA OF TORSO EXCLUDING BREAST (HCC): ICD-10-CM

## 2022-10-11 DIAGNOSIS — R91.8 MASS OF RIGHT LUNG: Primary | ICD-10-CM

## 2022-10-11 PROCEDURE — 1123F ACP DISCUSS/DSCN MKR DOCD: CPT | Performed by: INTERNAL MEDICINE

## 2022-10-11 PROCEDURE — 99215 OFFICE O/P EST HI 40 MIN: CPT | Performed by: INTERNAL MEDICINE

## 2022-10-11 PROCEDURE — 99211 OFF/OP EST MAY X REQ PHY/QHP: CPT

## 2022-10-11 RX ORDER — BENZONATATE 100 MG/1
100 CAPSULE ORAL 3 TIMES DAILY PRN
Qty: 30 CAPSULE | Refills: 0 | Status: SHIPPED | OUTPATIENT
Start: 2022-10-11 | End: 2022-10-21

## 2022-10-11 NOTE — PROGRESS NOTES
MA Rooming Questions  Patient: Radha Stapleton  MRN: [id number]    Date: 10/11/2022        1. Do you have any new issues?   no         2. Do you need any refills on medications?    no    3. Have you had any imaging done since your last visit? yes - CT    4. Have you been hospitalized or seen in the emergency room since your last visit here?   no    5. Did the patient have a depression screening completed today?  No    No data recorded     PHQ-9 Given to (if applicable):               PHQ-9 Score (if applicable):                     [] Positive     []  Negative              Does question #9 need addressed (if applicable)                     [] Yes    []  No               [place] Heron, CMA

## 2022-10-12 PROBLEM — R91.8 LUNG MASS: Status: ACTIVE | Noted: 2022-10-12

## 2022-10-18 ENCOUNTER — HOSPITAL ENCOUNTER (OUTPATIENT)
Dept: PULMONOLOGY | Age: 77
Discharge: HOME | End: 2022-10-18

## 2022-10-18 NOTE — PROGRESS NOTES
Patient arrived for his pft test.  He said he didn't think he could do test, he has phlegm and cough. He could not breath just walking. Gave patient central schedulings number and ask him to call and reschedule when he was better. Called Dr. Aileen Kanner office left message for Pablo Elias.

## 2022-10-24 ENCOUNTER — HOSPITAL ENCOUNTER (OUTPATIENT)
Dept: PET IMAGING | Age: 77
Discharge: HOME | End: 2022-10-24
Payer: MEDICARE

## 2022-10-24 DIAGNOSIS — R91.8 LUNG MASS: ICD-10-CM

## 2022-10-24 DIAGNOSIS — C43.61 MALIGNANT MELANOMA OF RIGHT UPPER EXTREMITY INCLUDING SHOULDER (HCC): ICD-10-CM

## 2022-10-24 PROCEDURE — 3430000000 HC RX DIAGNOSTIC RADIOPHARMACEUTICAL: Performed by: INTERNAL MEDICINE

## 2022-10-24 PROCEDURE — A9552 F18 FDG: HCPCS | Performed by: INTERNAL MEDICINE

## 2022-10-24 PROCEDURE — 2580000003 HC RX 258: Performed by: INTERNAL MEDICINE

## 2022-10-24 PROCEDURE — 78816 PET IMAGE W/CT FULL BODY: CPT

## 2022-10-24 RX ORDER — SODIUM CHLORIDE 0.9 % (FLUSH) 0.9 %
10 SYRINGE (ML) INJECTION PRN
Status: COMPLETED | OUTPATIENT
Start: 2022-10-24 | End: 2022-10-24

## 2022-10-24 RX ORDER — FLUDEOXYGLUCOSE F 18 200 MCI/ML
16.52 INJECTION, SOLUTION INTRAVENOUS
Status: COMPLETED | OUTPATIENT
Start: 2022-10-24 | End: 2022-10-24

## 2022-10-24 RX ADMIN — FLUDEOXYGLUCOSE F 18 16.52 MILLICURIE: 200 INJECTION, SOLUTION INTRAVENOUS at 14:18

## 2022-10-24 RX ADMIN — SODIUM CHLORIDE, PRESERVATIVE FREE 10 ML: 5 INJECTION INTRAVENOUS at 14:18

## 2022-10-25 ENCOUNTER — HOSPITAL ENCOUNTER (OUTPATIENT)
Dept: PULMONOLOGY | Age: 77
Discharge: HOME | End: 2022-10-25
Payer: MEDICARE

## 2022-10-25 PROCEDURE — 94729 DIFFUSING CAPACITY: CPT

## 2022-10-25 PROCEDURE — 94060 EVALUATION OF WHEEZING: CPT

## 2022-10-25 PROCEDURE — 94727 GAS DIL/WSHOT DETER LNG VOL: CPT

## 2022-10-28 NOTE — PROGRESS NOTES
Patient Name:  Jenae Ward  Patient :  1945  Patient MRN:  [id number]     Primary Oncologist: Bola Bradley MD  Referring Provider: Zacarias Worley MD     Date of Service: 2022       Chief Complaint:    Chief Complaint   Patient presents with    Follow-up        He came in for follow up visit. Patient Active Problem List:     Chronic kidney disease (CKD), stage IV (severe)      Right inguinal hernia     Urinary tract infection due to extended-spectrum beta lactamase (ESBL) producing Escherichia coli     Hemodialysis access site with mature fistula      HPI:   Jonathon Mora is a pleasant 79-year-old male patient who was referred for evaluation of melanoma. He went to Dr Chirag Tan for evaluation of mole to right back and had biopsy on May 19, 2021       He has red hair. He was referred to Dr Dayron Cox for wide excision and sentinel lymph node biopsy. We went over NCCN guideline. He has pathological stage IIA. He had wide excision 21 by Dr. Chirag Tan site has staples in place and appears to be healing well. Sentinal lymph node biopsy by Dr. Dayron Cox. Reviewed guidelines for follow-up including :  H&P every 6 to 12 months for 5 years including emphasis on nodes and skin, then annually as indicated no routine blood tests. routine imaging on screen for asymptomatic recurrence or metastatic disease is not recommended  imaging is indicated to investigate specific signs and symptoms  On hemodialysis 3 times a week. He has history of elevated PSA and prostate biopsy by Dr. Abdias Mayorga. I encouraged him to follow-up with urologist.    10/11/2022 he was referred back by Dr Chirag Tan. CT chest 2022 ordered by Dr Chirag Tan:  1. Pulmonary mass in the right upper lobe. Recommend follow-up PET-CT or tissue sampling. Otherwise, 3 month follow-up CT chest.  2. Centrilobular emphysema with granulomatous changes in the right chest.  No additional nodule. 3. Mediastinal lymphadenopathy.     He has cough since he has flu shot. I prescribed tessalon perle. He has appointment with Dr Isidro Corona in AM.  I recommend to mention to Dr Isidro Corona that he has seen me so I can discuss with her. He continues to follow-up with Dr. Luis Angel Hall. 11/8/2022 he came in for follow up visit. He has EB[facility] at [facility] on 11/3/2022. Reportedly path showed metastatic melanoma. I ordered B-ethan study and offered immunotherapy. PET scan 10/24/2022:  1. Hypermetabolic right upper lobe pulmonary nodule compatible with  malignancy. While finding may represent metastatic disease from patient's  known melanoma, finding remains more suspicious for a 2nd primary malignancy. Recommend tissue sampling if not already performed. 2. Focal area of FDG uptake is identified in the proximal sigmoid colon with  under distension of the wall. Underlying mass cannot be entirely excluded. Consider colonoscopy if not recently performed. 3. Status post right axillary lymph node dissection. No hypermetabolic lymph  nodes identified. 4. Severe diverticulosis. 5. Massive prostatomegaly. I offered immunotherapy, pembro q3wk. We discussed about potential SE of immunotherapy. We discussed about prognosis. No acute pain. Denied any nausea, vomiting or diarrhea. No fever or chills. No chest pain, shortness of breath or palpitation. No headache or dizzy spell. No specific bone pain. No melena or hematochezia. Denied any dysuria or hematuria.     Past Medical History:   Diagnosis Date    Abnormal urine     abn urine culture 4/3/2017- OR for 4/13/2017 cancelled-rescheduled for 4/25/2017- ( on 4/24/2017 pt states finishes antibiotic today and had repeat urine culture at office last week)    Chronic kidney disease, stage 4, severely decreased GFR ([facility] [address] 09/2016    Dialysis on Mon-Wed-Fri    follows with Dr Woody Marcial    Nunez catheter in place     Has had in place since 9/29/16    Hearing deficit     both ears --no hearing aids    Hemodialysis patient [facility])     Sees Dr Yanique Knox    Dialysis on Mon-Wed-Fri    History of blood transfusion     Hypertension     Follows with Dr. Yanique Knox    Inguinal hernia     Missing teeth, acquired     upper and lower  some broken off now    Wears glasses     for reading      Past Surgery History:     DIALYSIS FISTULA CREATION 11/29/2016 Left upper arm   HERNIA REPAIR Right 11/29/2016 INGUINAL HERNIA   PROSTATE SURGERY 04/2017 for BPH  Colonoscopy in 2019. Social History:  He smoked 2 PPD for 19 years and quit in 1995. Denied EtOH or illicit drug use. He has 3 children. Family History  No cancer in the family. Review of Systems: The remainder of ROS is unremarkable. Vital Signs: BP (!) 143/67 (Site: Right Upper Arm, Position: Sitting, Cuff Size: Medium Adult)   Pulse 98   Temp 97.8 °F (36.6 °C) (Temporal)   Resp 16   Ht 5' 8\" (1.727 m)   Wt 143 lb (64.9 kg)   SpO2 98%   BMI 21.74 kg/m²      Physical Exam:  CONSTITUTIONAL: awake, alert, cooperative, no apparent distress   EYES: pupils equal, round and reactive to light. Sclera clear and conjunctiva normal  ENT: Normocephalic, without obvious abnormality, atraumatic  NECK: supple, symmetrical, no jugular venous distension and no carotid bruits   HEMATOLOGIC/LYMPHATIC: no cervical, supraclavicular or axillary lymphadenopathy   LUNGS: no increased work of breathing and clear to auscultation   CARDIOVASCULAR: regular rate and rhythm, normal S1 and S2, + murmur  ABDOMEN: normal bowel sound, soft, non-distended, non-tender, no masses palpated, no hepatosplenomegaly   MUSCULOSKELETAL: full range of motion noted, tone is normal  NEUROLOGIC: Motor skills grossly intact. CN II - XII grossly intact. SKIN: No jaundice. Healing scar from biopsy to right back  EXTREMITIES: no LE edema.  LUE fistula w/bruit/thrill    Labs:  Hematology:  Lab Results   Component Value Date    WBC 10.1 07/19/2022    RBC 4.17 (L) 07/19/2022    HGB 12.5 (L) 07/19/2022    HCT 37.3 (L) 07/19/2022    MCV 89.4 07/19/2022 MCH 30.0 07/19/2022    MCHC 33.5 07/19/2022    RDW 12.8 07/19/2022     07/19/2022    MPV 10.0 07/19/2022    SEGSPCT 68.9 (H) 06/22/2021    EOSRELPCT 1.8 06/22/2021    BASOPCT 0.5 06/22/2021    LYMPHOPCT 21.6 (L) 06/22/2021    MONOPCT 6.9 (H) 06/22/2021    SEGSABS 6.5 06/22/2021    EOSABS 0.2 06/22/2021    BASOSABS 0.1 06/22/2021    LYMPHSABS 2.0 06/22/2021    MONOSABS 0.7 06/22/2021    DIFFTYPE AUTOMATED DIFFERENTIAL 06/22/2021     Chemistry:  Lab Results   Component Value Date     06/22/2021    K 4.5 07/19/2022     06/22/2021    CO2 28 06/22/2021    BUN 42 (H) 06/22/2021    CREATININE 3.3 (H) 09/30/2022    GLUCOSE 90 06/22/2021    CALCIUM 9.3 06/22/2021    PROT 6.8 09/05/2019    LABALBU 4.2 09/05/2019    BILITOT 0.3 09/05/2019    ALKPHOS 102 09/05/2019    AST 17 09/05/2019    ALT 10 09/05/2019    LABGLOM 18 (L) 09/30/2022    GFRAA 22 (L) 09/30/2022     Immunology:  Lab Results   Component Value Date    PROT 6.8 09/05/2019     Coagulation Panel:  Lab Results   Component Value Date    PROTIME 11.4 (L) 07/19/2022    INR 0.88 07/19/2022    APTT 30.9 07/19/2022    DDIMER 223 04/15/2011     Tumor Markers:  Lab Results   Component Value Date    PSA 6.5 (H) 02/15/2019      Observations:  No data recorded    Assessment & Plan:  1. 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 He had wide excision with sentinel lymph node biopsy in June 2021. He has follow-up with Dr Di Canavan I recommend to use sunscreen. 2.  Encouraged him to follow-up with urologist for history of elevated PSA and prostate biopsy. Reportedly there was no prostate cancer. He denied any symptoms to suggest prostate cancer. 3. CT chest in 9/2022 reviewed. He quit smoking in 1995. He was diagnosed with metastatic melanoma. I offered [place] I scheduled for chemo education. We discussed about prognosis and potential SE of [place] B-ethan ordered.  Follow up imaging study to see response in 3 months. I advise to keep age appropriate cancer screening up-to-date. RTC in 2 - 3 weeks. All of his questions have been answered for today.

## 2022-11-09 ENCOUNTER — HOSPITAL ENCOUNTER (OUTPATIENT)
Dept: INFUSION THERAPY | Age: 77
Discharge: HOME | End: 2022-11-09
Payer: MEDICARE

## 2022-11-09 ENCOUNTER — OFFICE VISIT (OUTPATIENT)
Dept: ONCOLOGY | Age: 77
End: 2022-11-09
Payer: MEDICARE

## 2022-11-09 VITALS
BODY MASS INDEX: 21.67 KG/M2 | RESPIRATION RATE: 16 BRPM | SYSTOLIC BLOOD PRESSURE: 143 MMHG | HEIGHT: 68 IN | TEMPERATURE: 97.8 F | DIASTOLIC BLOOD PRESSURE: 67 MMHG | OXYGEN SATURATION: 98 % | WEIGHT: 143 LBS | HEART RATE: 98 BPM

## 2022-11-09 DIAGNOSIS — C43.61 MALIGNANT MELANOMA OF RIGHT UPPER EXTREMITY INCLUDING SHOULDER (HCC): ICD-10-CM

## 2022-11-09 DIAGNOSIS — C43.9 MALIGNANT MELANOMA, UNSPECIFIED SITE (HCC): Primary | ICD-10-CM

## 2022-11-09 PROCEDURE — 99211 OFF/OP EST MAY X REQ PHY/QHP: CPT

## 2022-11-09 PROCEDURE — 99215 OFFICE O/P EST HI 40 MIN: CPT | Performed by: INTERNAL MEDICINE

## 2022-11-09 PROCEDURE — 1123F ACP DISCUSS/DSCN MKR DOCD: CPT | Performed by: INTERNAL MEDICINE

## 2022-11-09 RX ORDER — ONDANSETRON 2 MG/ML
8 INJECTION INTRAMUSCULAR; INTRAVENOUS
Status: CANCELLED | OUTPATIENT
Start: 2022-12-01

## 2022-11-09 RX ORDER — SODIUM CHLORIDE 9 MG/ML
5-250 INJECTION, SOLUTION INTRAVENOUS PRN
Status: CANCELLED | OUTPATIENT
Start: 2022-12-01

## 2022-11-09 RX ORDER — MEPERIDINE HYDROCHLORIDE 50 MG/ML
12.5 INJECTION INTRAMUSCULAR; INTRAVENOUS; SUBCUTANEOUS PRN
Status: CANCELLED | OUTPATIENT
Start: 2022-12-01

## 2022-11-09 RX ORDER — ALBUTEROL SULFATE 90 UG/1
4 AEROSOL, METERED RESPIRATORY (INHALATION) PRN
Status: CANCELLED | OUTPATIENT
Start: 2022-12-01

## 2022-11-09 RX ORDER — HEPARIN SODIUM (PORCINE) LOCK FLUSH IV SOLN 100 UNIT/ML 100 UNIT/ML
500 SOLUTION INTRAVENOUS PRN
Status: CANCELLED | OUTPATIENT
Start: 2022-12-01

## 2022-11-09 RX ORDER — EPINEPHRINE 1 MG/ML
0.3 INJECTION, SOLUTION, CONCENTRATE INTRAVENOUS PRN
Status: CANCELLED | OUTPATIENT
Start: 2022-12-01

## 2022-11-09 RX ORDER — SODIUM CHLORIDE 9 MG/ML
INJECTION, SOLUTION INTRAVENOUS CONTINUOUS
Status: CANCELLED | OUTPATIENT
Start: 2022-12-01

## 2022-11-09 RX ORDER — FAMOTIDINE 10 MG/ML
20 INJECTION, SOLUTION INTRAVENOUS
Status: CANCELLED | OUTPATIENT
Start: 2022-12-01

## 2022-11-09 RX ORDER — DIPHENHYDRAMINE HYDROCHLORIDE 50 MG/ML
50 INJECTION INTRAMUSCULAR; INTRAVENOUS
Status: CANCELLED | OUTPATIENT
Start: 2022-12-01

## 2022-11-09 RX ORDER — SODIUM CHLORIDE 9 MG/ML
5-40 INJECTION INTRAVENOUS PRN
Status: CANCELLED | OUTPATIENT
Start: 2022-12-01

## 2022-11-09 RX ORDER — SODIUM CHLORIDE 0.9 % (FLUSH) 0.9 %
5-40 SYRINGE (ML) INJECTION PRN
Status: CANCELLED | OUTPATIENT
Start: 2022-12-01

## 2022-11-09 RX ORDER — ACETAMINOPHEN 325 MG/1
650 TABLET ORAL
Status: CANCELLED | OUTPATIENT
Start: 2022-12-01

## 2022-11-09 NOTE — PROGRESS NOTES
MA Rooming Questions  Patient: Radha Stapleton  MRN: [id number]    Date: 11/9/2022        1. Do you have any new issues?   no         2. Do you need any refills on medications?    no    3. Have you had any imaging done since your last visit? yes - PET, XR    4. Have you been hospitalized or seen in the emergency room since your last visit here?   no    5. Did the patient have a depression screening completed today?  No    No data recorded     PHQ-9 Given to (if applicable):               PHQ-9 Score (if applicable):                     [] Positive     []  Negative              Does question #9 need addressed (if applicable)                     [] Yes    []  No               [place] Heron, CMA

## 2022-11-10 ENCOUNTER — CLINICAL DOCUMENTATION (OUTPATIENT)
Dept: ONCOLOGY | Age: 77
End: 2022-11-10

## 2022-11-10 NOTE — PROGRESS NOTES
This patient discussed Dr. Opal Cohen requests BRAF order with Dr. Carlene Contreras. Dr. Carlene Contreras to sign off on orders for BRAF through [facility] pathology. [facility] Pathology order form filled out with patient information and faxed to Dr. Basil Basurto office (as per Dr. Nilesh Asher request)   for her to sign, Dr. Nilesh Asher office to then fax order to [facility] Pathology. This RN to continue to follow.

## 2022-11-14 ENCOUNTER — TELEPHONE (OUTPATIENT)
Dept: GASTROENTEROLOGY | Age: 77
End: 2022-11-14

## 2022-11-14 NOTE — TELEPHONE ENCOUNTER
Called pt. In regards to a referral for a colon screening.  Made appt for pt to see jerome on 12/6/22 @11am

## 2022-11-23 ENCOUNTER — TELEPHONE (OUTPATIENT)
Dept: ONCOLOGY | Age: 77
End: 2022-11-23

## 2022-11-23 NOTE — TELEPHONE ENCOUNTER
Tried calling patient to schedule patient education and tx start date, no answer and had to leave a VM to call me back at my direct phone number.

## 2022-11-23 NOTE — TELEPHONE ENCOUNTER
Patient returned my call while I was at lunch, patient is scheduled for chemo ed on Wed. 11/30 @ 1000 and tx on Thurs.  12/01 @ 1115, also advised that we changed his appt tiffanie/ALONDRA Azul to [facility]. 12/09 @ 1030, advised them that 1 visitor allowed @ time of education and day of their treatments, patient states understanding

## 2022-11-30 ENCOUNTER — NURSE ONLY (OUTPATIENT)
Dept: ONCOLOGY | Age: 77
End: 2022-11-30
Payer: MEDICARE

## 2022-11-30 ENCOUNTER — HOSPITAL ENCOUNTER (OUTPATIENT)
Dept: INFUSION THERAPY | Age: 77
Discharge: HOME | End: 2022-11-30
Payer: MEDICARE

## 2022-11-30 VITALS
HEART RATE: 68 BPM | OXYGEN SATURATION: 97 % | TEMPERATURE: 97 F | HEIGHT: 68 IN | BODY MASS INDEX: 21.07 KG/M2 | SYSTOLIC BLOOD PRESSURE: 198 MMHG | WEIGHT: 139 LBS | DIASTOLIC BLOOD PRESSURE: 96 MMHG

## 2022-11-30 DIAGNOSIS — C43.61 MALIGNANT MELANOMA OF RIGHT UPPER EXTREMITY INCLUDING SHOULDER (HCC): ICD-10-CM

## 2022-11-30 DIAGNOSIS — C43.9 MALIGNANT MELANOMA, UNSPECIFIED SITE (HCC): Primary | ICD-10-CM

## 2022-11-30 LAB
ALBUMIN SERPL-MCNC: 4.6 GM/DL (ref 3.4–5)
ALP BLD-CCNC: 111 IU/L (ref 40–128)
ALT SERPL-CCNC: 15 U/L (ref 10–40)
ANION GAP SERPL CALCULATED.3IONS-SCNC: 12 MMOL/L (ref 4–16)
AST SERPL-CCNC: 16 IU/L (ref 15–37)
BASOPHILS ABSOLUTE: 0 K/CU MM
BASOPHILS RELATIVE PERCENT: 0.4 % (ref 0–1)
BILIRUB SERPL-MCNC: 0.6 MG/DL (ref 0–1)
BUN BLDV-MCNC: 11 MG/DL (ref 6–23)
CALCIUM SERPL-MCNC: 9 MG/DL (ref 8.3–10.6)
CHLORIDE BLD-SCNC: 94 MMOL/L (ref 99–110)
CO2: 30 MMOL/L (ref 21–32)
CREAT SERPL-MCNC: 2.6 MG/DL (ref 0.9–1.3)
DIFFERENTIAL TYPE: ABNORMAL
EOSINOPHILS ABSOLUTE: 0.3 K/CU MM
EOSINOPHILS RELATIVE PERCENT: 3.2 % (ref 0–3)
GFR SERPL CREATININE-BSD FRML MDRD: 25 ML/MIN/1.73M2
GLUCOSE BLD-MCNC: 74 MG/DL (ref 70–99)
HCT VFR BLD CALC: 41.6 % (ref 42–52)
HEMOGLOBIN: 13.5 GM/DL (ref 13.5–18)
LYMPHOCYTES ABSOLUTE: 1.7 K/CU MM
LYMPHOCYTES RELATIVE PERCENT: 18.2 % (ref 24–44)
MCH RBC QN AUTO: 29 PG (ref 27–31)
MCHC RBC AUTO-ENTMCNC: 32.5 % (ref 32–36)
MCV RBC AUTO: 89.5 FL (ref 78–100)
MONOCYTES ABSOLUTE: 0.8 K/CU MM
MONOCYTES RELATIVE PERCENT: 8.6 % (ref 0–4)
PDW BLD-RTO: 13.4 % (ref 11.7–14.9)
PLATELET # BLD: 204 K/CU MM (ref 140–440)
PMV BLD AUTO: 9.7 FL (ref 7.5–11.1)
POTASSIUM SERPL-SCNC: 4.3 MMOL/L (ref 3.5–5.1)
RBC # BLD: 4.65 M/CU MM (ref 4.6–6.2)
SEGMENTED NEUTROPHILS ABSOLUTE COUNT: 6.3 K/CU MM
SEGMENTED NEUTROPHILS RELATIVE PERCENT: 69.6 % (ref 36–66)
SODIUM BLD-SCNC: 136 MMOL/L (ref 135–145)
TOTAL PROTEIN: 7.3 GM/DL (ref 6.4–8.2)
WBC # BLD: 9.1 K/CU MM (ref 4–10.5)

## 2022-11-30 PROCEDURE — 85025 COMPLETE CBC W/AUTO DIFF WBC: CPT

## 2022-11-30 PROCEDURE — 36415 COLL VENOUS BLD VENIPUNCTURE: CPT

## 2022-11-30 PROCEDURE — 99213 OFFICE O/P EST LOW 20 MIN: CPT

## 2022-11-30 PROCEDURE — 80053 COMPREHEN METABOLIC PANEL: CPT

## 2022-11-30 PROCEDURE — 99211 OFF/OP EST MAY X REQ PHY/QHP: CPT | Performed by: INTERNAL MEDICINE

## 2022-11-30 RX ORDER — ONDANSETRON HYDROCHLORIDE 8 MG/1
8 TABLET, FILM COATED ORAL EVERY 8 HOURS PRN
Qty: 90 TABLET | Refills: 3 | Status: SHIPPED | OUTPATIENT
Start: 2022-11-30

## 2022-11-30 NOTE — PROGRESS NOTES
Patient arrived alone for treatment planning and chemotherapy education. Discussed treatment plan, potential side effects, prevention, and symptom management. Reviewed chemotherapy education folder and drug monograph. Patient's regimen will be Keytruda every 21 days. Patient signed chemotherapy consent for Keytruda. Copy of consent given to patient. Reviewed chemotherapy schedule/calendar. Rx for Zofran sent to pharmacy per Dr Ewa Bowdne  Patient given calendar and written instructions for these medications and how/when to take. Patient's premeds as follows:  after chemo [facility]). Zofran 8 mg one tab every 8 hours PRN. Patient given on-call phone number for after office hours and weekends. CBC and CMP drawn today. Confirmed first chemotherapy appointment for Miguelangel Valentine on 12/1/2022 at 11:15.

## 2022-12-01 ENCOUNTER — HOSPITAL ENCOUNTER (OUTPATIENT)
Dept: INFUSION THERAPY | Age: 77
Discharge: HOME | End: 2022-12-01
Payer: MEDICARE

## 2022-12-01 VITALS
TEMPERATURE: 97.7 F | BODY MASS INDEX: 21.34 KG/M2 | DIASTOLIC BLOOD PRESSURE: 68 MMHG | HEART RATE: 67 BPM | HEIGHT: 68 IN | OXYGEN SATURATION: 96 % | WEIGHT: 140.8 LBS | RESPIRATION RATE: 16 BRPM | SYSTOLIC BLOOD PRESSURE: 144 MMHG

## 2022-12-01 DIAGNOSIS — N18.4 CHRONIC KIDNEY DISEASE (CKD), STAGE IV (SEVERE) (HCC): Primary | ICD-10-CM

## 2022-12-01 DIAGNOSIS — C43.61 MALIGNANT MELANOMA OF RIGHT UPPER EXTREMITY INCLUDING SHOULDER (HCC): ICD-10-CM

## 2022-12-01 DIAGNOSIS — C43.61 MALIGNANT MELANOMA OF RIGHT UPPER EXTREMITY INCLUDING SHOULDER (HCC): Primary | ICD-10-CM

## 2022-12-01 LAB — TSH HIGH SENSITIVITY: 1.65 UIU/ML (ref 0.27–4.2)

## 2022-12-01 PROCEDURE — 2580000003 HC RX 258: Performed by: INTERNAL MEDICINE

## 2022-12-01 PROCEDURE — 6360000002 HC RX W HCPCS: Performed by: INTERNAL MEDICINE

## 2022-12-01 PROCEDURE — 84443 ASSAY THYROID STIM HORMONE: CPT

## 2022-12-01 PROCEDURE — 96413 CHEMO IV INFUSION 1 HR: CPT

## 2022-12-01 RX ORDER — SODIUM CHLORIDE 9 MG/ML
5-250 INJECTION, SOLUTION INTRAVENOUS PRN
Status: DISCONTINUED | OUTPATIENT
Start: 2022-12-01 | End: 2022-12-02 | Stop reason: HOSPADM

## 2022-12-01 RX ADMIN — SODIUM CHLORIDE 20 ML/HR: 9 INJECTION, SOLUTION INTRAVENOUS at 12:15

## 2022-12-01 RX ADMIN — SODIUM CHLORIDE 200 MG: 9 INJECTION, SOLUTION INTRAVENOUS at 12:16

## 2022-12-01 NOTE — PROGRESS NOTES
Pt here for first chemotherapy, Currently has no issues or questions for physician. PIV started in RT anterior forearm. Blood drawn and sent to lab for processing. Labs reviewed from 11/30/22. Treatment authorized and administered as ordered Pt tolerated well and left treatment area ambulatory, and in stable condition. Copy of AVS provided     Treatment orders and medication sequencing (when applicable) was verified by 2 registered nurses. The treatment plan was confirmed with the patient prior to administration, and the patient understands the need to report any treatment-related symptoms. Prior to administration, when applicable, the following 8 elements of medication administration were reviewed with 2nd Registered Nurse prior to dosing: drug name, drug dose, infusion volume when prepared in a syringe, rate of administration, expiration dates and/or times, appearance and integrity of drug(s), and rate of pump for infusion. The 5 rights of medication administration have been verified.

## 2022-12-06 ENCOUNTER — OFFICE VISIT (OUTPATIENT)
Dept: GASTROENTEROLOGY | Age: 77
End: 2022-12-06
Payer: MEDICARE

## 2022-12-06 VITALS
OXYGEN SATURATION: 99 % | BODY MASS INDEX: 21.46 KG/M2 | WEIGHT: 141.6 LBS | HEART RATE: 75 BPM | DIASTOLIC BLOOD PRESSURE: 68 MMHG | TEMPERATURE: 97.2 F | HEIGHT: 68 IN | SYSTOLIC BLOOD PRESSURE: 138 MMHG

## 2022-12-06 DIAGNOSIS — Z87.19 HISTORY OF DIVERTICULOSIS: ICD-10-CM

## 2022-12-06 DIAGNOSIS — R93.89 ABNORMAL FINDING ON CT SCAN: Primary | ICD-10-CM

## 2022-12-06 DIAGNOSIS — Z86.010 HISTORY OF COLON POLYPS: ICD-10-CM

## 2022-12-06 PROCEDURE — 99204 OFFICE O/P NEW MOD 45 MIN: CPT | Performed by: NURSE PRACTITIONER

## 2022-12-06 PROCEDURE — 1123F ACP DISCUSS/DSCN MKR DOCD: CPT | Performed by: NURSE PRACTITIONER

## 2022-12-06 RX ORDER — AMOXICILLIN 500 MG/1
TABLET, FILM COATED ORAL
COMMUNITY
Start: 2022-12-05

## 2022-12-06 ASSESSMENT — ENCOUNTER SYMPTOMS
CONSTIPATION: 0
ABDOMINAL PAIN: 0
DIARRHEA: 0
BLOOD IN STOOL: 0
COUGH: 0
NAUSEA: 0
BACK PAIN: 0
WHEEZING: 0
SHORTNESS OF BREATH: 0
VOMITING: 0
EYE DISCHARGE: 0
COLOR CHANGE: 0
EYE PAIN: 0

## 2022-12-06 NOTE — PROGRESS NOTES
Shyla Quigley 78 y.o. male was seen by AMARILIS Louise on 12/6/2022     Wt Readings from Last 3 Encounters:   12/06/22 141 lb 9.6 oz (64.2 kg)   12/01/22 140 lb 12.8 oz (63.9 kg)   11/30/22 139 lb (63 kg)       HPI  Jose De Jesus Sierra is a pleasant 78 y.o.  male who presents today for abnomal finding on CT of abdomen/pelvis. He has a past medical history of abnormal urine, chronic kidney disease, stage 4, severely decreased GFR, harrison catheter in place, hearing deficit, hemodialysis patient, history of blood transfusion, hypertension, inguinal hernia, missing teeth, acquired, and wears glasses. He is currently receiving chemotherapy followed with Dr. Mary Grace Blum last week for lung cancer. He has dialysis on M, W, F.  His last colonoscopy was done by Dr. Niecy Nixon on 2- showing two colon polyps; one adenomatous and other hyperplastic that were removed, diffuse sigmoid diverticulosis with narrowed/fixed sigmoid colon and grade 1-2 internal hemorrhoids. He had a barium enema done on 2-5-2019 showing a focal area of persistent narrowing in the sigmoid colon which could be related to peristalsis, however a mass cannot be excluded and colonic diverticulosis. The  image demonstrates normal bowel gas pattern without evidence of   obstruction. Calcified phleboliths are noted in the pelvis. Vascular   calcifications are noted. There is diffuse diverticulosis. There is an area of narrowing in the sigmoid colon that persists on multiple   images. An underlying mass in this region cannot be excluded. The appendix is visualized. His PET scan done on 10- showed: focal area of FDG uptake is identified in the proximal sigmoid colon with under distention of the wall; underlying mass cannot be excluded recommend colonoscopy. His lab work done on 11- showed WBC 9.1, RBC 4.65, Hgb 13.5, Hct 41.6, MCV 89.5 and Platelets 253.   Sodium 136, Potassium 4.3, BUN 11, Creatinine 2.6, GFR 25, Total Bilirubin 0.6, ALT 15, and AST 16. He denies changes in his bowel pattern. He is taking Metamucil daily. His typical bowel pattern is two to three times daily with soft brown formed stools. No diarrhea or constipation. No blood in his stools or melena. No excess belching or flatulence. His appetite is good without early satiety. His weight is stable. No nausea or vomiting. No abdominal pain, bloating or distention. He denies heartburn or acid reflux symptoms. No nocturnal awakenings with acid reflux. No dysphagia or pain with swallowing. No family history of stomach or colon cancer. ROS  Review of Systems   Constitutional:  Negative for appetite change, chills, diaphoresis, fatigue and fever. HENT:  Positive for hearing loss and tinnitus (intermittent). Negative for ear pain. Eyes:  Positive for visual disturbance. Negative for pain and discharge. Respiratory:  Negative for cough, shortness of breath and wheezing. Cardiovascular:  Negative for chest pain, palpitations and leg swelling. Gastrointestinal:  Negative for abdominal pain, blood in stool, constipation, diarrhea, nausea and vomiting. Endocrine: Negative for cold intolerance and heat intolerance. Genitourinary:  Negative for dysuria, frequency, hematuria and urgency. Musculoskeletal:  Negative for back pain, myalgias and neck pain. Skin:  Negative for color change, pallor and rash. Allergic/Immunologic: Negative for environmental allergies and food allergies. Neurological:  Negative for dizziness, seizures, weakness and headaches. Hematological:  Bruises/bleeds easily. Psychiatric/Behavioral:  Negative for dysphoric mood and sleep disturbance. The patient is not nervous/anxious.       Allergies  No Known Allergies    Medications  Current Outpatient Medications   Medication Sig Dispense Refill    Amoxicillin 500 MG TABS       ondansetron (ZOFRAN) 8 MG tablet Take 1 tablet by mouth every 8 hours as needed for Nausea or Vomiting 90 tablet 3    carvedilol (COREG) 3.125 MG tablet Take 1 tablet by mouth daily 180 tablet 3    B Complex-C-Folic Acid (SHEA-ABHISHEK) TABS Take 1 tablet by mouth daily      RENVELA 800 MG tablet 2 Tabs  PO TID WITH MEALS  2    nitroGLYCERIN (NITRODUR) 0.2 MG/HR Place 1 patch onto the skin every other day   0    isosorbide mononitrate (IMDUR) 30 MG extended release tablet 30 mg daily   0    pantoprazole (PROTONIX) 40 MG tablet TAKE 1 TABLET BY MOUTH EVERY DAY (Patient not taking: Reported on 12/6/2022)       No current facility-administered medications for this visit. Past medical history:   He has a past medical history of Abnormal urine, Chronic kidney disease, stage 4, severely decreased GFR ([address] Nunez catheter in place, Hearing deficit, Hemodialysis patient [facility]), History of blood transfusion, Hypertension, Inguinal hernia, Missing teeth, acquired, and Wears glasses. Past surgical history:  He has a past surgical history that includes Dialysis fistula creation (Left, 11/29/2016); hernia repair (Right, 11/29/2016); Prostate surgery (04/2017); Axillary Surgery (N/A, 6/22/2021); and Abdomen surgery (Right, 6/22/2021). Social History:  He reports that he quit smoking about 30 years ago. His smoking use included cigarettes. He started smoking about 60 years ago. He has a 60.00 pack-year smoking history. He has never used smokeless tobacco. He reports that he does not drink alcohol and does not use drugs. Family history:  His family history includes Diabetes in his brother and mother; Heart Disease in his brother. Objective    Vitals:    12/06/22 1100   BP: 138/68   Pulse: 75   Temp: 97.2 °F (36.2 °C)   SpO2: 99%        Physical exam    Physical Exam  Constitutional:       General: He is not in acute distress. Appearance: Normal appearance. He is well-developed. He is not ill-appearing, toxic-appearing or diaphoretic. HENT:      Head: Normocephalic and atraumatic. Nose: Nose normal.      Mouth/Throat:      Mouth: Mucous membranes are moist.   Cardiovascular:      Rate and Rhythm: Normal rate and regular rhythm. Pulses: Normal pulses. Heart sounds: Normal heart sounds. No murmur heard. No gallop. Pulmonary:      Effort: Pulmonary effort is normal. No respiratory distress. Breath sounds: Normal breath sounds. No stridor. No wheezing or rhonchi. Abdominal:      General: Bowel sounds are normal. There is no distension. Palpations: Abdomen is soft. There is no mass. Tenderness: There is no abdominal tenderness. Hernia: No hernia is present. Musculoskeletal:         General: Normal range of motion. Cervical back: Neck supple. Skin:     General: Skin is warm and dry. Neurological:      Mental Status: He is alert and oriented to person, place, and time. Psychiatric:         Mood and Affect: Mood normal.       Hospital Outpatient Visit on 12/01/2022   Component Date Value Ref Range Status    TSH, High Sensitivity 12/01/2022 1.650  0.270 - 4.20 uIu/ml Final    Comment:         Patients with high levels of Biotin intake (ie >5mg/day) may have falsely decreased TSHS   levels. Samples collected within 8 hours of Biotin intake may require additional information for   diagnosis.      Hospital Outpatient Visit on 11/30/2022   Component Date Value Ref Range Status    WBC 11/30/2022 9.1  4.0 - 10.5 K/CU MM Final    RBC 11/30/2022 4.65  4.6 - 6.2 M/CU MM Final    Hemoglobin 11/30/2022 13.5  13.5 - 18.0 GM/DL Final    Hematocrit 11/30/2022 41.6 (A)  42 - 52 % Final    MCV 11/30/2022 89.5  78 - 100 FL Final    MCH 11/30/2022 29.0  27 - 31 PG Final    MCHC 11/30/2022 32.5  32.0 - 36.0 % Final    RDW 11/30/2022 13.4  11.7 - 14.9 % Final    Platelets 17/66/2908 204  140 - 440 K/CU MM Final    MPV 11/30/2022 9.7  7.5 - 11.1 FL Final    Differential Type 11/30/2022 AUTOMATED DIFFERENTIAL   Final    Segs Relative 11/30/2022 69.6 (A)  36 - 66 % Final    Lymphocytes % 11/30/2022 18.2 (A)  24 - 44 % Final    Monocytes % 11/30/2022 8.6 (A)  0 - 4 % Final    Eosinophils % 11/30/2022 3.2 (A)  0 - 3 % Final    Basophils % 11/30/2022 0.4  0 - 1 % Final    Segs Absolute 11/30/2022 6.3  K/CU MM Final    Lymphocytes Absolute 11/30/2022 1.7  K/CU MM Final    Monocytes Absolute 11/30/2022 0.8  K/CU MM Final    Eosinophils Absolute 11/30/2022 0.3  K/CU MM Final    Basophils Absolute 11/30/2022 0.0  K/CU MM Final    Sodium 11/30/2022 136  135 - 145 MMOL/L Final    Potassium 11/30/2022 4.3  3.5 - 5.1 MMOL/L Final    Chloride 11/30/2022 94 (A)  99 - 110 mMol/L Final    CO2 11/30/2022 30  21 - 32 MMOL/L Final    BUN 11/30/2022 11  6 - 23 MG/DL Final    Creatinine 11/30/2022 2.6 (A)  0.9 - 1.3 MG/DL Final    Est, Glom Filt Rate 11/30/2022 25 (A)  >60 mL/min/1.73m2 Final    Comment:         Effective Oct 17, 2022          These results are not intended for use in patients <25years of age. eGFR results are calculated without a race factor using the 2021 CKD-EPI equation. Careful clinical correlation is recommended, particularly when comparing to results   calculated using previous equations. The CKD-EPI equation is less accurate in patients with extremes of muscle mass, extra-renal   metabolism of creatine, excessive creatine ingestion, or following therapy that affects   renal tubular secretion.       Glucose 11/30/2022 74  70 - 99 MG/DL Final    Calcium 11/30/2022 9.0  8.3 - 10.6 MG/DL Final    Albumin 11/30/2022 4.6  3.4 - 5.0 GM/DL Final    Total Protein 11/30/2022 7.3  6.4 - 8.2 GM/DL Final    Total Bilirubin 11/30/2022 0.6  0.0 - 1.0 MG/DL Final    ALT 11/30/2022 15  10 - 40 U/L Final    AST 11/30/2022 16  15 - 37 IU/L Final    Alkaline Phosphatase 11/30/2022 111  40 - 128 IU/L Final    Anion Gap 11/30/2022 12  4 - 16 Final   Office Visit on 10/12/2022   Component Date Value Ref Range Status    FEV1 %Pred-Pre 10/25/2022 78  % Final    FEV1 %Pred-Post 10/25/2022 87  % Final    FEV1/FVC-Pre 10/25/2022 89  % Final    FEV1/FVC-Post 10/25/2022 88  % Final    DLCO %Pred 10/25/2022 61  % Final    TLC %Pred 10/25/2022 100  % Final       Assessment and Plan:  1. Will plan for a colonoscopy with MAC anesthesia. The patient was informed of the risks and benefits of the procedure. 2.  Abnormal finding on PET scan with concern for mass in sigmoid colon. Will order colonoscopy to rule out colorectal cancer. NO evidence of anemia. NO change in bowel pattern and no blood in stools. 3.  History of colon polyps will order colonoscopy for colorectal cancer surveillance. 4.  Further recommendations for follow-up will be determined after the colonoscopy has been completed.

## 2022-12-07 ENCOUNTER — TELEPHONE (OUTPATIENT)
Dept: GASTROENTEROLOGY | Age: 77
End: 2022-12-07

## 2022-12-07 NOTE — TELEPHONE ENCOUNTER
Per my call to Chirag SOSA at [facility]; C-scope is pending Lorenzo Kirby #[id number]/RC JORDAN#[id number].

## 2022-12-08 ENCOUNTER — PREP FOR PROCEDURE (OUTPATIENT)
Dept: GASTROENTEROLOGY | Age: 77
End: 2022-12-08

## 2022-12-08 RX ORDER — SODIUM CHLORIDE 0.9 % (FLUSH) 0.9 %
5-40 SYRINGE (ML) INJECTION EVERY 12 HOURS SCHEDULED
Status: CANCELLED | OUTPATIENT
Start: 2022-12-08

## 2022-12-08 RX ORDER — SODIUM CHLORIDE 9 MG/ML
25 INJECTION, SOLUTION INTRAVENOUS PRN
Status: CANCELLED | OUTPATIENT
Start: 2022-12-08

## 2022-12-08 RX ORDER — SODIUM CHLORIDE 0.9 % (FLUSH) 0.9 %
5-40 SYRINGE (ML) INJECTION PRN
Status: CANCELLED | OUTPATIENT
Start: 2022-12-08

## 2022-12-08 RX ORDER — SODIUM CHLORIDE, SODIUM LACTATE, POTASSIUM CHLORIDE, CALCIUM CHLORIDE 600; 310; 30; 20 MG/100ML; MG/100ML; MG/100ML; MG/100ML
INJECTION, SOLUTION INTRAVENOUS CONTINUOUS
Status: CANCELLED | OUTPATIENT
Start: 2022-12-08

## 2022-12-09 ENCOUNTER — OFFICE VISIT (OUTPATIENT)
Dept: ONCOLOGY | Age: 77
End: 2022-12-09

## 2022-12-09 ENCOUNTER — HOSPITAL ENCOUNTER (OUTPATIENT)
Dept: INFUSION THERAPY | Age: 77
Discharge: HOME | End: 2022-12-09
Payer: MEDICARE

## 2022-12-09 VITALS
DIASTOLIC BLOOD PRESSURE: 72 MMHG | HEART RATE: 65 BPM | TEMPERATURE: 97.6 F | SYSTOLIC BLOOD PRESSURE: 151 MMHG | WEIGHT: 138.4 LBS | HEIGHT: 68 IN | OXYGEN SATURATION: 98 % | BODY MASS INDEX: 20.98 KG/M2

## 2022-12-09 DIAGNOSIS — C43.9 MALIGNANT MELANOMA, UNSPECIFIED SITE (HCC): ICD-10-CM

## 2022-12-09 DIAGNOSIS — C43.9 MALIGNANT MELANOMA, UNSPECIFIED SITE (HCC): Primary | ICD-10-CM

## 2022-12-09 LAB
ALBUMIN SERPL-MCNC: 4.5 GM/DL (ref 3.4–5)
ALP BLD-CCNC: 94 IU/L (ref 40–128)
ALT SERPL-CCNC: 15 U/L (ref 10–40)
ANION GAP SERPL CALCULATED.3IONS-SCNC: 13 MMOL/L (ref 4–16)
AST SERPL-CCNC: 19 IU/L (ref 15–37)
BASOPHILS ABSOLUTE: 0.1 K/CU MM
BASOPHILS RELATIVE PERCENT: 0.6 % (ref 0–1)
BILIRUB SERPL-MCNC: 0.6 MG/DL (ref 0–1)
BUN BLDV-MCNC: 9 MG/DL (ref 6–23)
CALCIUM SERPL-MCNC: 8.8 MG/DL (ref 8.3–10.6)
CHLORIDE BLD-SCNC: 94 MMOL/L (ref 99–110)
CO2: 29 MMOL/L (ref 21–32)
CREAT SERPL-MCNC: 2.5 MG/DL (ref 0.9–1.3)
DIFFERENTIAL TYPE: ABNORMAL
EOSINOPHILS ABSOLUTE: 0.3 K/CU MM
EOSINOPHILS RELATIVE PERCENT: 3.6 % (ref 0–3)
GFR SERPL CREATININE-BSD FRML MDRD: 26 ML/MIN/1.73M2
GLUCOSE BLD-MCNC: 77 MG/DL (ref 70–99)
HCT VFR BLD CALC: 39 % (ref 42–52)
HEMOGLOBIN: 12.8 GM/DL (ref 13.5–18)
LYMPHOCYTES ABSOLUTE: 1.5 K/CU MM
LYMPHOCYTES RELATIVE PERCENT: 19 % (ref 24–44)
MCH RBC QN AUTO: 29.2 PG (ref 27–31)
MCHC RBC AUTO-ENTMCNC: 32.8 % (ref 32–36)
MCV RBC AUTO: 89 FL (ref 78–100)
MONOCYTES ABSOLUTE: 0.8 K/CU MM
MONOCYTES RELATIVE PERCENT: 10.3 % (ref 0–4)
PDW BLD-RTO: 13.2 % (ref 11.7–14.9)
PLATELET # BLD: 219 K/CU MM (ref 140–440)
PMV BLD AUTO: 10.2 FL (ref 7.5–11.1)
POTASSIUM SERPL-SCNC: 3.9 MMOL/L (ref 3.5–5.1)
RBC # BLD: 4.38 M/CU MM (ref 4.6–6.2)
SEGMENTED NEUTROPHILS ABSOLUTE COUNT: 5.3 K/CU MM
SEGMENTED NEUTROPHILS RELATIVE PERCENT: 66.5 % (ref 36–66)
SODIUM BLD-SCNC: 136 MMOL/L (ref 135–145)
TOTAL PROTEIN: 7.1 GM/DL (ref 6.4–8.2)
WBC # BLD: 8 K/CU MM (ref 4–10.5)

## 2022-12-09 PROCEDURE — 85025 COMPLETE CBC W/AUTO DIFF WBC: CPT

## 2022-12-09 PROCEDURE — 99211 OFF/OP EST MAY X REQ PHY/QHP: CPT

## 2022-12-09 PROCEDURE — 80053 COMPREHEN METABOLIC PANEL: CPT

## 2022-12-09 PROCEDURE — 36415 COLL VENOUS BLD VENIPUNCTURE: CPT

## 2022-12-09 ASSESSMENT — PATIENT HEALTH QUESTIONNAIRE - PHQ9
2. FEELING DOWN, DEPRESSED OR HOPELESS: 0
SUM OF ALL RESPONSES TO PHQ9 QUESTIONS 1 & 2: 0
SUM OF ALL RESPONSES TO PHQ QUESTIONS 1-9: 0
SUM OF ALL RESPONSES TO PHQ QUESTIONS 1-9: 0
1. LITTLE INTEREST OR PLEASURE IN DOING THINGS: 0
SUM OF ALL RESPONSES TO PHQ QUESTIONS 1-9: 0
SUM OF ALL RESPONSES TO PHQ QUESTIONS 1-9: 0

## 2022-12-09 NOTE — PROGRESS NOTES
Patient Name:  Bud Older  Patient :  1945  Patient MRN:  [id number]     Primary Oncologist: Sydnie Lofton MD  Referring Provider: Scott Delgado MD     Date of Service: 2022       Chief Complaint:    Chief Complaint   Patient presents with    Follow-up          He came in for follow up visit. Patient Active Problem List:     Chronic kidney disease (CKD), stage IV (severe)      Right inguinal hernia     Urinary tract infection due to extended-spectrum beta lactamase (ESBL) producing Escherichia coli     Hemodialysis access site with mature fistula      HPI:   Jacklyn Palafox is a pleasant 70-year-old male patient who was referred for evaluation of melanoma. He went to Dr Kamille Sarmiento for evaluation of mole to right back and had biopsy on May 19, 2021       He has red hair. He was referred to Dr Sara Deutsch for wide excision and sentinel lymph node biopsy. We went over NCCN guideline. He has pathological stage IIA. He had wide excision 21 by Dr. Kamille Sarmiento site has staples in place and appears to be healing well. Sentinal lymph node biopsy by Dr. Sara Deutsch. Reviewed guidelines for follow-up including :  H&P every 6 to 12 months for 5 years including emphasis on nodes and skin, then annually as indicated no routine blood tests. routine imaging on screen for asymptomatic recurrence or metastatic disease is not recommended  imaging is indicated to investigate specific signs and symptoms  On hemodialysis 3 times a week. He has history of elevated PSA and prostate biopsy by Dr. Navin Gonzalez. I encouraged him to follow-up with urologist.    10/11/2022 he was referred back by Dr Kamille Sarmiento. CT chest 2022 ordered by Dr Kamille Sarmiento:  1. Pulmonary mass in the right upper lobe. Recommend follow-up PET-CT or tissue sampling. Otherwise, 3 month follow-up CT chest.  2. Centrilobular emphysema with granulomatous changes in the right chest.  No additional nodule. 3. Mediastinal lymphadenopathy.     He has cough since he has flu shot. I prescribed tessalon perle. He has appointment with Dr Alona Ni in AM.  I recommend to mention to Dr Alona Ni that he has seen me so I can discuss with her. He continues to follow-up with Dr. Georgina Bojorquez. 11/8/2022 he came in for follow up visit. He has EBUS at [facility] on 11/3/2022. Reportedly path showed metastatic melanoma. I ordered B-ethan study and offered immunotherapy. PET scan 10/24/2022:  1. Hypermetabolic right upper lobe pulmonary nodule compatible with  malignancy. While finding may represent metastatic disease from patient's  known melanoma, finding remains more suspicious for a 2nd primary malignancy. Recommend tissue sampling if not already performed. 2. Focal area of FDG uptake is identified in the proximal sigmoid colon with  under distension of the wall. Underlying mass cannot be entirely excluded. Consider colonoscopy if not recently performed. 3. Status post right axillary lymph node dissection. No hypermetabolic lymph  nodes identified. 4. Severe diverticulosis. 5. Massive prostatomegaly. I offered immunotherapy, pembro q3wk. We discussed about potential SE of immunotherapy. We discussed about prognosis. 11/3/2022 Additional Path Testing  ALK ([address]):  Negative                BRAF Mutation Analysis:  TNP (insufficient)  EGFR Mutation Analysis:  TNP (insufficient)   MET (FISH):  QNS  RET (FISH):  QNS  OS1 (FISH):  Negative    C1D1 Pembro 12/2/2022 12/9/2022 He came in for toxicity check. He feels in his usual state of health. Most recent labs reviewed. His appetite is good. He has fatigue with dialysis days but this is at his baseline. No evidence of IrAE. No acute pain. Denied any nausea, vomiting or diarrhea. No fever or chills. No chest pain, shortness of breath or palpitation. No headache or dizzy spell. No specific bone pain. No melena or hematochezia. Denied any dysuria or hematuria.     Past Medical History:   Diagnosis Date    Abnormal urine     abn urine culture 4/3/2017- OR for 4/13/2017 cancelled-rescheduled for 4/25/2017- ( on 4/24/2017 pt states finishes antibiotic today and had repeat urine culture at office last week)    Chronic kidney disease, stage 4, severely decreased GFR ([address] 09/2016    Dialysis on Mon-Wed-Fri    follows with Dr Mauricio Lam    Nunez catheter in place     Has had in place since 9/29/16    Hearing deficit     both ears --no hearing aids    Hemodialysis patient [facility])     Sees Dr Mauricio Lam    Dialysis on Mon-Wed-Fri    History of blood transfusion     Hypertension     Follows with Dr. Mauricio Lam    Inguinal hernia     Missing teeth, acquired     upper and lower  some broken off now    Wears glasses     for reading      Past Surgery History:     DIALYSIS FISTULA CREATION 11/29/2016 Left upper arm   HERNIA REPAIR Right 11/29/2016 INGUINAL HERNIA   PROSTATE SURGERY 04/2017 for BPH  Colonoscopy in 2019. Social History:  He smoked 2 PPD for 19 years and quit in 1995. Denied EtOH or illicit drug use. He has 3 children. Family History  No cancer in the family. Review of Systems: The remainder of ROS is unremarkable. Vital Signs: BP (!) 151/72 (Site: Right Upper Arm, Position: Sitting, Cuff Size: Medium Adult)   Pulse 65   Temp 97.6 °F (36.4 °C) (Infrared)   Ht 5' 8\" (1.727 m)   Wt 138 lb 6.4 oz (62.8 kg)   SpO2 98%   BMI 21.04 kg/m²      Physical Exam:  CONSTITUTIONAL: awake, alert, cooperative, no apparent distress   EYES: pupils equal, round and reactive to light.  Sclera clear and conjunctiva normal  ENT: Normocephalic, without obvious abnormality, atraumatic, no oral lesions  NECK: supple, symmetrical, no jugular venous distension and no carotid bruits   HEMATOLOGIC/LYMPHATIC: no cervical, supraclavicular or axillary lymphadenopathy   LUNGS: no increased work of breathing and clear to auscultation   CARDIOVASCULAR: regular rate and rhythm, normal S1 and S2, + murmur  ABDOMEN: normal bowel sound, soft, non-distended, non-tender, no masses palpated, no hepatosplenomegaly   MUSCULOSKELETAL: full range of motion noted, tone is normal  NEUROLOGIC: Motor skills grossly intact. CN II - XII grossly intact. SKIN: No jaundice. Healing scar from biopsy to right back  EXTREMITIES: no LE edema. LUE fistula w/bruit/thrill    Labs:  Hematology:  Lab Results   Component Value Date    WBC 8.0 12/09/2022    RBC 4.38 (L) 12/09/2022    HGB 12.8 (L) 12/09/2022    HCT 39.0 (L) 12/09/2022    MCV 89.0 12/09/2022    MCH 29.2 12/09/2022    MCHC 32.8 12/09/2022    RDW 13.2 12/09/2022     12/09/2022    MPV 10.2 12/09/2022    SEGSPCT 66.5 (H) 12/09/2022    EOSRELPCT 3.6 (H) 12/09/2022    BASOPCT 0.6 12/09/2022    LYMPHOPCT 19.0 (L) 12/09/2022    MONOPCT 10.3 (H) 12/09/2022    SEGSABS 5.3 12/09/2022    EOSABS 0.3 12/09/2022    BASOSABS 0.1 12/09/2022    LYMPHSABS 1.5 12/09/2022    MONOSABS 0.8 12/09/2022    DIFFTYPE AUTOMATED DIFFERENTIAL 12/09/2022     Chemistry:  Lab Results   Component Value Date     12/09/2022    K 3.9 12/09/2022    CL 94 (L) 12/09/2022    CO2 29 12/09/2022    BUN 9 12/09/2022    CREATININE 2.5 (H) 12/09/2022    GLUCOSE 77 12/09/2022    CALCIUM 8.8 12/09/2022    PROT 7.1 12/09/2022    LABALBU 4.5 12/09/2022    BILITOT 0.6 12/09/2022    ALKPHOS 94 12/09/2022    AST 19 12/09/2022    ALT 15 12/09/2022    LABGLOM 26 (L) 12/09/2022    GFRAA 22 (L) 09/30/2022     Immunology:  Lab Results   Component Value Date    PROT 7.1 12/09/2022     Coagulation Panel:  Lab Results   Component Value Date    PROTIME 11.4 (L) 07/19/2022    INR 0.88 07/19/2022    APTT 30.9 07/19/2022    DDIMER 223 04/15/2011     Tumor Markers:  Lab Results   Component Value Date    PSA 6.5 (H) 02/15/2019      Observations:  No data recorded    Assessment & Plan:  1. 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 He had wide excision with sentinel lymph node biopsy in June 2021.   He has follow-up with Dr Kvng Williamson I recommend to use sunscreen. 2.  Encouraged him to follow-up with urologist for history of elevated PSA and prostate biopsy. Reportedly there was no prostate cancer. He denied any symptoms to suggest prostate cancer. 3. CT chest in 9/2022 reviewed. He quit smoking in 1995. He was diagnosed with metastatic melanoma. I offered [employer] ([place] He completed chemotherapy teaching. We discussed about prognosis and potential SE of [place] ([place] C1D1 on 12/1/2022. Tolerating well to date with no dose limiting toxicity. BRAF sent however insufficient tissue and no other block to send for testing. . Follow up imaging study to see response in 3 months. I advise to keep age appropriate cancer screening up-to-date. RTC in 2 - 3 weeks. All of his questions have been answered for today. Latha Gómez PA-C    Portions of this note are copied forward from previous clinic note. Interval history, ROS, physical exam, assessment and plan has been reviewed and updated for accuracy by this provider.

## 2022-12-11 NOTE — PROGRESS NOTES
Patient Name:  Errol Egan  Patient :  1945  Patient MRN:  [id number]     Primary Oncologist: Melissa Garcia MD  Referring Provider: Lacy Whitmore MD     Date of Service: 2022       Chief Complaint:    No chief complaint on file. He came in for follow up visit. Patient Active Problem List:     Chronic kidney disease (CKD), stage IV (severe)      Right inguinal hernia     Urinary tract infection due to extended-spectrum beta lactamase (ESBL) producing Escherichia coli     Hemodialysis access site with mature fistula      HPI:   Nichole Barnes is a pleasant 53-year-old male patient who was referred for evaluation of melanoma. He went to Dr Vita Block for evaluation of mole to right back and had biopsy on May 19, 2021       He has red hair. He was referred to Dr Sarai Johnson for wide excision and sentinel lymph node biopsy. We went over NCCN guideline. He has pathological stage IIA. He had wide excision 21 by Dr. Vita Block site has staples in place and appears to be healing well. Sentinal lymph node biopsy by Dr. Sarai Johnson. Reviewed guidelines for follow-up including :  H&P every 6 to 12 months for 5 years including emphasis on nodes and skin, then annually as indicated no routine blood tests. routine imaging on screen for asymptomatic recurrence or metastatic disease is not recommended imaging is indicated to investigate specific signs and symptoms  On hemodialysis 3 times a week. He has history of elevated PSA and prostate biopsy by Dr. Isabel Weinstein. I encouraged him to follow-up with urologist.    10/11/2022 he was referred back by Dr Vita Block. CT chest 2022 ordered by Dr Vita Block:  1. Pulmonary mass in the right upper lobe. Recommend follow-up PET-CT or tissue sampling. Otherwise, 3 month follow-up CT chest.  2. Centrilobular emphysema with granulomatous changes in the right chest.  No additional nodule. 3. Mediastinal lymphadenopathy. 2022 he came in for follow up visit.   He has EBUS at [facility] on 11/3/2022. Reportedly path showed metastatic melanoma. I ordered B-ethan study and offered immunotherapy. PET scan 10/24/2022:  1. Hypermetabolic right upper lobe pulmonary nodule compatible with  malignancy. While finding may represent metastatic disease from patient's known melanoma, finding remains more suspicious for a 2nd primary malignancy. Recommend tissue sampling if not already performed. 2. Focal area of FDG uptake is identified in the proximal sigmoid colon with under distension of the wall. Underlying mass cannot be entirely excluded. Consider colonoscopy if not recently performed. 3. Status post right axillary lymph node dissection. No hypermetabolic lymph nodes identified. 4. Severe diverticulosis. 5. Massive prostatomegaly. I offered immunotherapy, pembro q3wk. We discussed about potential SE of immunotherapy. We discussed about prognosis. 11/3/2022 Additional Path Testing  ALK ([address]):  Negative                BRAF Mutation Analysis:  TNP (insufficient)  EGFR Mutation Analysis:  TNP (insufficient)   MET (FISH):  QNS  RET (FISH):  QNS  OS1 (FISH):  Negative    12/22/2022 he came I for follow up visit. C2 due on 12/22/2022. Interim labs reviewed. I remind him about colonoscopy. No autoimmune mediated reactions. No acute pain. Denied any nausea, vomiting or diarrhea. No fever or chills. No chest pain, shortness of breath or palpitation. No headache or dizzy spell. No specific bone pain. No melena or hematochezia. Denied any dysuria or hematuria.     Past Medical History:   Diagnosis Date    Abnormal urine     abn urine culture 4/3/2017- OR for 4/13/2017 cancelled-rescheduled for 4/25/2017- ( on 4/24/2017 pt states finishes antibiotic today and had repeat urine culture at office last week)    Chronic kidney disease, stage 4, severely decreased GFR ([facility] [address] 09/2016    Dialysis on Mon-Wed-Fri    follows with Dr Srini Jimenes    Nunez catheter in place     Has had in place since 9/29/16    Hearing deficit     both ears --no hearing aids    Hemodialysis patient [facility])     Sees Dr Lucía Hill    Dialysis on Mon-Wed-Fri    History of blood transfusion     Hypertension     Follows with Dr. Lucía Hill    Inguinal hernia     Missing teeth, acquired     upper and lower  some broken off now    Wears glasses     for reading      Past Surgery History:     DIALYSIS FISTULA CREATION 11/29/2016 Left upper arm   HERNIA REPAIR Right 11/29/2016 INGUINAL HERNIA   PROSTATE SURGERY 04/2017 for BPH  Colonoscopy in 2019. Social History:  He smoked 2 PPD for 19 years and quit in 1995. Denied EtOH or illicit drug use. He has 3 children. Family History  No cancer in the family. Review of Systems: The remainder of ROS is unremarkable. Vital Signs: There were no vitals taken for this visit. Physical Exam:  CONSTITUTIONAL: awake, alert, cooperative, no apparent distress   EYES: pupils equal, round and reactive to light. Sclera clear and conjunctiva normal  ENT: Normocephalic, without obvious abnormality, atraumatic  NECK: supple, symmetrical, no jugular venous distension and no carotid bruits   HEMATOLOGIC/LYMPHATIC: no cervical, supraclavicular or axillary lymphadenopathy   LUNGS: no increased work of breathing and clear to auscultation   CARDIOVASCULAR: regular rate and rhythm, normal S1 and S2, + murmur  ABDOMEN: normal bowel sound, soft, non-distended, non-tender, no masses palpated, no hepatosplenomegaly   MUSCULOSKELETAL: full range of motion noted, tone is normal  NEUROLOGIC: Motor skills grossly intact. CN II - XII grossly intact. SKIN: No jaundice. Healing scar from biopsy to right back  EXTREMITIES: no LE edema.  LUE fistula w/bruit/thrill    Labs:  Hematology:  Lab Results   Component Value Date    WBC 8.0 12/09/2022    RBC 4.38 (L) 12/09/2022    HGB 12.8 (L) 12/09/2022    HCT 39.0 (L) 12/09/2022    MCV 89.0 12/09/2022    MCH 29.2 12/09/2022    MCHC 32.8 12/09/2022    RDW 13.2 12/09/2022  12/09/2022    MPV 10.2 12/09/2022    SEGSPCT 66.5 (H) 12/09/2022    EOSRELPCT 3.6 (H) 12/09/2022    BASOPCT 0.6 12/09/2022    LYMPHOPCT 19.0 (L) 12/09/2022    MONOPCT 10.3 (H) 12/09/2022    SEGSABS 5.3 12/09/2022    EOSABS 0.3 12/09/2022    BASOSABS 0.1 12/09/2022    LYMPHSABS 1.5 12/09/2022    MONOSABS 0.8 12/09/2022    DIFFTYPE AUTOMATED DIFFERENTIAL 12/09/2022     Chemistry:  Lab Results   Component Value Date     12/09/2022    K 3.9 12/09/2022    CL 94 (L) 12/09/2022    CO2 29 12/09/2022    BUN 9 12/09/2022    CREATININE 2.5 (H) 12/09/2022    GLUCOSE 77 12/09/2022    CALCIUM 8.8 12/09/2022    PROT 7.1 12/09/2022    LABALBU 4.5 12/09/2022    BILITOT 0.6 12/09/2022    ALKPHOS 94 12/09/2022    AST 19 12/09/2022    ALT 15 12/09/2022    LABGLOM 26 (L) 12/09/2022    GFRAA 22 (L) 09/30/2022     Immunology:  Lab Results   Component Value Date    PROT 7.1 12/09/2022     Coagulation Panel:  Lab Results   Component Value Date    PROTIME 11.4 (L) 07/19/2022    INR 0.88 07/19/2022    APTT 30.9 07/19/2022    DDIMER 223 04/15/2011     Tumor Markers:  Lab Results   Component Value Date    PSA 6.5 (H) 02/15/2019      Observations:  PHQ-9 Total Score: 0 (12/9/2022 10:42 AM)    Assessment & Plan:  1. 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 He had wide excision with sentinel lymph node biopsy in June 2021. He has follow-up with Dr Crystal Sewell I recommend to use sunscreen. 2.  Encouraged him to follow-up with urologist for history of elevated PSA and prostate biopsy. Reportedly there was no prostate cancer. He denied any symptoms to suggest prostate cancer. 3. CT chest in 9/2022 reviewed. He quit smoking in 1995. He was diagnosed with metastatic melanoma. I offered [employer] ([place]). I scheduled for chemo education. We discussed about prognosis and potential SE of [facility] ([place]). BRAF send however insufficient tissue and no other block to send for testing.  Follow up imaging study to see response in 3 months. I advise to keep age appropriate cancer screening up-to-date. RTC in 2 - 3 weeks. All of his questions have been answered for today.

## 2022-12-13 DIAGNOSIS — N39.0 URINARY TRACT INFECTION DUE TO EXTENDED-SPECTRUM BETA LACTAMASE (ESBL) PRODUCING ESCHERICHIA COLI: ICD-10-CM

## 2022-12-13 DIAGNOSIS — B96.29 URINARY TRACT INFECTION DUE TO EXTENDED-SPECTRUM BETA LACTAMASE (ESBL) PRODUCING ESCHERICHIA COLI: ICD-10-CM

## 2022-12-13 DIAGNOSIS — Z99.2 HEMODIALYSIS ACCESS SITE WITH MATURE FISTULA (HCC): ICD-10-CM

## 2022-12-13 DIAGNOSIS — K40.90 RIGHT INGUINAL HERNIA: ICD-10-CM

## 2022-12-13 DIAGNOSIS — C43.9 MALIGNANT MELANOMA, UNSPECIFIED SITE (HCC): Primary | ICD-10-CM

## 2022-12-13 DIAGNOSIS — Z16.12 URINARY TRACT INFECTION DUE TO EXTENDED-SPECTRUM BETA LACTAMASE (ESBL) PRODUCING ESCHERICHIA COLI: ICD-10-CM

## 2022-12-13 DIAGNOSIS — C43.61 MALIGNANT MELANOMA OF RIGHT UPPER EXTREMITY INCLUDING SHOULDER (HCC): ICD-10-CM

## 2022-12-13 DIAGNOSIS — N18.4 CHRONIC KIDNEY DISEASE (CKD), STAGE IV (SEVERE) (HCC): ICD-10-CM

## 2022-12-13 DIAGNOSIS — R91.8 LUNG MASS: ICD-10-CM

## 2022-12-14 DIAGNOSIS — R53.83 OTHER FATIGUE: ICD-10-CM

## 2022-12-14 DIAGNOSIS — R91.8 LUNG MASS: Primary | ICD-10-CM

## 2022-12-21 RX ORDER — HEPARIN SODIUM (PORCINE) LOCK FLUSH IV SOLN 100 UNIT/ML 100 UNIT/ML
500 SOLUTION INTRAVENOUS PRN
OUTPATIENT
Start: 2022-12-30

## 2022-12-21 RX ORDER — FAMOTIDINE 10 MG/ML
20 INJECTION, SOLUTION INTRAVENOUS
OUTPATIENT
Start: 2022-12-30

## 2022-12-21 RX ORDER — SODIUM CHLORIDE 9 MG/ML
INJECTION, SOLUTION INTRAVENOUS CONTINUOUS
OUTPATIENT
Start: 2022-12-30

## 2022-12-21 RX ORDER — SODIUM CHLORIDE 9 MG/ML
5-250 INJECTION, SOLUTION INTRAVENOUS PRN
OUTPATIENT
Start: 2022-12-30

## 2022-12-21 RX ORDER — ACETAMINOPHEN 325 MG/1
650 TABLET ORAL
OUTPATIENT
Start: 2022-12-30

## 2022-12-21 RX ORDER — HEPARIN SODIUM (PORCINE) LOCK FLUSH IV SOLN 100 UNIT/ML 100 UNIT/ML
500 SOLUTION INTRAVENOUS PRN
OUTPATIENT
Start: 2023-01-20

## 2022-12-21 RX ORDER — ONDANSETRON 2 MG/ML
8 INJECTION INTRAMUSCULAR; INTRAVENOUS
OUTPATIENT
Start: 2022-12-30

## 2022-12-21 RX ORDER — SODIUM CHLORIDE 9 MG/ML
5-250 INJECTION, SOLUTION INTRAVENOUS PRN
OUTPATIENT
Start: 2023-01-20

## 2022-12-21 RX ORDER — EPINEPHRINE 1 MG/ML
0.3 INJECTION, SOLUTION, CONCENTRATE INTRAVENOUS PRN
OUTPATIENT
Start: 2022-12-30

## 2022-12-21 RX ORDER — DIPHENHYDRAMINE HYDROCHLORIDE 50 MG/ML
50 INJECTION INTRAMUSCULAR; INTRAVENOUS
OUTPATIENT
Start: 2022-12-30

## 2022-12-21 RX ORDER — SODIUM CHLORIDE 9 MG/ML
5-40 INJECTION INTRAVENOUS PRN
OUTPATIENT
Start: 2022-12-30

## 2022-12-21 RX ORDER — ACETAMINOPHEN 325 MG/1
650 TABLET ORAL
OUTPATIENT
Start: 2023-01-20

## 2022-12-21 RX ORDER — EPINEPHRINE 1 MG/ML
0.3 INJECTION, SOLUTION, CONCENTRATE INTRAVENOUS PRN
OUTPATIENT
Start: 2023-01-20

## 2022-12-21 RX ORDER — FAMOTIDINE 10 MG/ML
20 INJECTION, SOLUTION INTRAVENOUS
OUTPATIENT
Start: 2023-01-20

## 2022-12-21 RX ORDER — ALBUTEROL SULFATE 90 UG/1
4 AEROSOL, METERED RESPIRATORY (INHALATION) PRN
OUTPATIENT
Start: 2022-12-30

## 2022-12-21 RX ORDER — SODIUM CHLORIDE 9 MG/ML
5-40 INJECTION INTRAVENOUS PRN
OUTPATIENT
Start: 2023-01-20

## 2022-12-21 RX ORDER — DIPHENHYDRAMINE HYDROCHLORIDE 50 MG/ML
50 INJECTION INTRAMUSCULAR; INTRAVENOUS
OUTPATIENT
Start: 2023-01-20

## 2022-12-21 RX ORDER — ALBUTEROL SULFATE 90 UG/1
4 AEROSOL, METERED RESPIRATORY (INHALATION) PRN
OUTPATIENT
Start: 2023-01-20

## 2022-12-21 RX ORDER — ONDANSETRON 2 MG/ML
8 INJECTION INTRAMUSCULAR; INTRAVENOUS
OUTPATIENT
Start: 2023-01-20

## 2022-12-21 RX ORDER — MEPERIDINE HYDROCHLORIDE 50 MG/ML
12.5 INJECTION INTRAMUSCULAR; INTRAVENOUS; SUBCUTANEOUS PRN
OUTPATIENT
Start: 2022-12-30

## 2022-12-21 RX ORDER — SODIUM CHLORIDE 0.9 % (FLUSH) 0.9 %
5-40 SYRINGE (ML) INJECTION PRN
OUTPATIENT
Start: 2022-12-30

## 2022-12-21 RX ORDER — MEPERIDINE HYDROCHLORIDE 50 MG/ML
12.5 INJECTION INTRAMUSCULAR; INTRAVENOUS; SUBCUTANEOUS PRN
OUTPATIENT
Start: 2023-01-20

## 2022-12-21 RX ORDER — SODIUM CHLORIDE 0.9 % (FLUSH) 0.9 %
5-40 SYRINGE (ML) INJECTION PRN
OUTPATIENT
Start: 2023-01-20

## 2022-12-21 RX ORDER — SODIUM CHLORIDE 9 MG/ML
INJECTION, SOLUTION INTRAVENOUS CONTINUOUS
OUTPATIENT
Start: 2023-01-20

## 2022-12-22 ENCOUNTER — OFFICE VISIT (OUTPATIENT)
Dept: ONCOLOGY | Age: 77
End: 2022-12-22

## 2022-12-22 DIAGNOSIS — C43.61 MALIGNANT MELANOMA OF RIGHT UPPER EXTREMITY INCLUDING SHOULDER (HCC): Primary | ICD-10-CM

## 2022-12-25 ENCOUNTER — APPOINTMENT (OUTPATIENT)
Dept: GENERAL RADIOLOGY | Age: 77
DRG: 871 | End: 2022-12-25
Payer: MEDICARE

## 2022-12-25 ENCOUNTER — HOSPITAL ENCOUNTER (INPATIENT)
Age: 77
LOS: 3 days | Discharge: ANOTHER HOSPITAL | DRG: 871 | End: 2022-12-28
Attending: EMERGENCY MEDICINE | Admitting: INTERNAL MEDICINE
Payer: MEDICARE

## 2022-12-25 ENCOUNTER — APPOINTMENT (OUTPATIENT)
Dept: CT IMAGING | Age: 77
DRG: 871 | End: 2022-12-25
Payer: MEDICARE

## 2022-12-25 DIAGNOSIS — J96.01 ACUTE HYPOXEMIC RESPIRATORY FAILURE DUE TO COVID-19 (HCC): Primary | ICD-10-CM

## 2022-12-25 DIAGNOSIS — U07.1 PNEUMONIA DUE TO COVID-19 VIRUS: ICD-10-CM

## 2022-12-25 DIAGNOSIS — N18.6 ESRD (END STAGE RENAL DISEASE) ON DIALYSIS (HCC): ICD-10-CM

## 2022-12-25 DIAGNOSIS — U07.1 ACUTE HYPOXEMIC RESPIRATORY FAILURE DUE TO COVID-19 (HCC): Primary | ICD-10-CM

## 2022-12-25 DIAGNOSIS — Z99.2 ESRD (END STAGE RENAL DISEASE) ON DIALYSIS (HCC): ICD-10-CM

## 2022-12-25 DIAGNOSIS — J12.82 PNEUMONIA DUE TO COVID-19 VIRUS: ICD-10-CM

## 2022-12-25 PROBLEM — J96.00 ACUTE RESPIRATORY FAILURE (HCC): Status: ACTIVE | Noted: 2022-12-25

## 2022-12-25 LAB
ADENOVIRUS DETECTION BY PCR: NOT DETECTED
ALBUMIN SERPL-MCNC: 3.4 GM/DL (ref 3.4–5)
ALP BLD-CCNC: 86 IU/L (ref 40–129)
ALT SERPL-CCNC: 16 U/L (ref 10–40)
ANION GAP SERPL CALCULATED.3IONS-SCNC: 16 MMOL/L (ref 4–16)
AST SERPL-CCNC: 28 IU/L (ref 15–37)
BASE EXCESS MIXED: 5.6 (ref 0–1.2)
BASOPHILS ABSOLUTE: 0 K/CU MM
BASOPHILS RELATIVE PERCENT: 0.3 % (ref 0–1)
BILIRUB SERPL-MCNC: 1 MG/DL (ref 0–1)
BORDETELLA PARAPERTUSSIS BY PCR: NOT DETECTED
BORDETELLA PERTUSSIS PCR: NOT DETECTED
BUN BLDV-MCNC: 35 MG/DL (ref 6–23)
CALCIUM SERPL-MCNC: 9.2 MG/DL (ref 8.3–10.6)
CHLAMYDOPHILA PNEUMONIA PCR: NOT DETECTED
CHLORIDE BLD-SCNC: 91 MMOL/L (ref 99–110)
CO2: 26 MMOL/L (ref 21–32)
COMMENT: ABNORMAL
CORONAVIRUS 229E PCR: NOT DETECTED
CORONAVIRUS HKU1 PCR: NOT DETECTED
CORONAVIRUS NL63 PCR: NOT DETECTED
CORONAVIRUS OC43 PCR: NOT DETECTED
CREAT SERPL-MCNC: 4.7 MG/DL (ref 0.9–1.3)
DIFFERENTIAL TYPE: ABNORMAL
EOSINOPHILS ABSOLUTE: 0 K/CU MM
EOSINOPHILS RELATIVE PERCENT: 0 % (ref 0–3)
GFR SERPL CREATININE-BSD FRML MDRD: 12 ML/MIN/1.73M2
GLUCOSE BLD-MCNC: 99 MG/DL (ref 70–99)
HCO3 VENOUS: 29.8 MMOL/L (ref 19–25)
HCT VFR BLD CALC: 36.8 % (ref 42–52)
HEMOGLOBIN: 12.1 GM/DL (ref 13.5–18)
HUMAN METAPNEUMOVIRUS PCR: NOT DETECTED
IMMATURE NEUTROPHIL %: 0.6 % (ref 0–0.43)
INFLUENZA A BY PCR: NOT DETECTED
INFLUENZA A H1 (2009) PCR: NOT DETECTED
INFLUENZA A H1 PANDEMIC PCR: NOT DETECTED
INFLUENZA A H3 PCR: NOT DETECTED
INFLUENZA B BY PCR: NOT DETECTED
LACTATE: 2.2 MMOL/L (ref 0.5–1.9)
LYMPHOCYTES ABSOLUTE: 0.5 K/CU MM
LYMPHOCYTES RELATIVE PERCENT: 3.3 % (ref 24–44)
MCH RBC QN AUTO: 29.3 PG (ref 27–31)
MCHC RBC AUTO-ENTMCNC: 32.9 % (ref 32–36)
MCV RBC AUTO: 89.1 FL (ref 78–100)
MONOCYTES ABSOLUTE: 0.8 K/CU MM
MONOCYTES RELATIVE PERCENT: 5.2 % (ref 0–4)
MYCOPLASMA PNEUMONIAE PCR: NOT DETECTED
NUCLEATED RBC %: 0 %
O2 SAT, VEN: 41.5 % (ref 50–70)
PARAINFLUENZA 1 PCR: NOT DETECTED
PARAINFLUENZA 2 PCR: NOT DETECTED
PARAINFLUENZA 3 PCR: NOT DETECTED
PARAINFLUENZA 4 PCR: NOT DETECTED
PCO2, VEN: 41 MMHG (ref 38–52)
PDW BLD-RTO: 12.8 % (ref 11.7–14.9)
PH VENOUS: 7.47 (ref 7.32–7.42)
PLATELET # BLD: 284 K/CU MM (ref 140–440)
PMV BLD AUTO: 10 FL (ref 7.5–11.1)
PO2, VEN: 25 MMHG (ref 28–48)
POTASSIUM SERPL-SCNC: 3.9 MMOL/L (ref 3.5–5.1)
PRO-BNP: ABNORMAL PG/ML
RBC # BLD: 4.13 M/CU MM (ref 4.6–6.2)
RHINOVIRUS ENTEROVIRUS PCR: NOT DETECTED
RSV PCR: NOT DETECTED
SARS-COV-2: ABNORMAL
SEGMENTED NEUTROPHILS ABSOLUTE COUNT: 13.3 K/CU MM
SEGMENTED NEUTROPHILS RELATIVE PERCENT: 90.6 % (ref 36–66)
SODIUM BLD-SCNC: 133 MMOL/L (ref 135–145)
TOTAL IMMATURE NEUTOROPHIL: 0.09 K/CU MM
TOTAL NUCLEATED RBC: 0 K/CU MM
TOTAL PROTEIN: 6.7 GM/DL (ref 6.4–8.2)
TOTAL RETICULOCYTE COUNT: 0.07 K/CU MM
TROPONIN T: 0.04 NG/ML
WBC # BLD: 14.7 K/CU MM (ref 4–10.5)

## 2022-12-25 PROCEDURE — 87040 BLOOD CULTURE FOR BACTERIA: CPT

## 2022-12-25 PROCEDURE — 2580000003 HC RX 258: Performed by: NURSE PRACTITIONER

## 2022-12-25 PROCEDURE — 93005 ELECTROCARDIOGRAM TRACING: CPT | Performed by: PHYSICIAN ASSISTANT

## 2022-12-25 PROCEDURE — 94640 AIRWAY INHALATION TREATMENT: CPT

## 2022-12-25 PROCEDURE — 96375 TX/PRO/DX INJ NEW DRUG ADDON: CPT

## 2022-12-25 PROCEDURE — 6370000000 HC RX 637 (ALT 250 FOR IP): Performed by: PHYSICIAN ASSISTANT

## 2022-12-25 PROCEDURE — 6360000002 HC RX W HCPCS: Performed by: PHYSICIAN ASSISTANT

## 2022-12-25 PROCEDURE — 71045 X-RAY EXAM CHEST 1 VIEW: CPT

## 2022-12-25 PROCEDURE — 6360000002 HC RX W HCPCS: Performed by: NURSE PRACTITIONER

## 2022-12-25 PROCEDURE — 87077 CULTURE AEROBIC IDENTIFY: CPT

## 2022-12-25 PROCEDURE — 83880 ASSAY OF NATRIURETIC PEPTIDE: CPT

## 2022-12-25 PROCEDURE — 80053 COMPREHEN METABOLIC PANEL: CPT

## 2022-12-25 PROCEDURE — 84484 ASSAY OF TROPONIN QUANT: CPT

## 2022-12-25 PROCEDURE — 0202U NFCT DS 22 TRGT SARS-COV-2: CPT

## 2022-12-25 PROCEDURE — 85025 COMPLETE CBC W/AUTO DIFF WBC: CPT

## 2022-12-25 PROCEDURE — 99285 EMERGENCY DEPT VISIT HI MDM: CPT

## 2022-12-25 PROCEDURE — 94761 N-INVAS EAR/PLS OXIMETRY MLT: CPT

## 2022-12-25 PROCEDURE — 71275 CT ANGIOGRAPHY CHEST: CPT

## 2022-12-25 PROCEDURE — 6370000000 HC RX 637 (ALT 250 FOR IP): Performed by: NURSE PRACTITIONER

## 2022-12-25 PROCEDURE — 2580000003 HC RX 258: Performed by: PHYSICIAN ASSISTANT

## 2022-12-25 PROCEDURE — 6360000004 HC RX CONTRAST MEDICATION: Performed by: NURSE PRACTITIONER

## 2022-12-25 PROCEDURE — 85379 FIBRIN DEGRADATION QUANT: CPT

## 2022-12-25 PROCEDURE — 1200000000 HC SEMI PRIVATE

## 2022-12-25 PROCEDURE — 83605 ASSAY OF LACTIC ACID: CPT

## 2022-12-25 PROCEDURE — 84145 PROCALCITONIN (PCT): CPT

## 2022-12-25 PROCEDURE — 86141 C-REACTIVE PROTEIN HS: CPT

## 2022-12-25 PROCEDURE — 82805 BLOOD GASES W/O2 SATURATION: CPT

## 2022-12-25 PROCEDURE — 36415 COLL VENOUS BLD VENIPUNCTURE: CPT

## 2022-12-25 PROCEDURE — 96374 THER/PROPH/DIAG INJ IV PUSH: CPT

## 2022-12-25 PROCEDURE — 2700000000 HC OXYGEN THERAPY PER DAY

## 2022-12-25 RX ORDER — ALBUTEROL SULFATE 90 UG/1
2 AEROSOL, METERED RESPIRATORY (INHALATION) EVERY 4 HOURS
Status: DISCONTINUED | OUTPATIENT
Start: 2022-12-25 | End: 2022-12-28

## 2022-12-25 RX ORDER — ENOXAPARIN SODIUM 100 MG/ML
40 INJECTION SUBCUTANEOUS DAILY
Status: DISCONTINUED | OUTPATIENT
Start: 2022-12-25 | End: 2022-12-25

## 2022-12-25 RX ORDER — ACETAMINOPHEN 650 MG/1
650 SUPPOSITORY RECTAL EVERY 6 HOURS PRN
Status: DISCONTINUED | OUTPATIENT
Start: 2022-12-25 | End: 2022-12-28 | Stop reason: HOSPADM

## 2022-12-25 RX ORDER — DEXAMETHASONE SODIUM PHOSPHATE 10 MG/ML
6 INJECTION, SOLUTION INTRAMUSCULAR; INTRAVENOUS EVERY 24 HOURS
Status: DISCONTINUED | OUTPATIENT
Start: 2022-12-26 | End: 2022-12-28 | Stop reason: HOSPADM

## 2022-12-25 RX ORDER — ONDANSETRON 2 MG/ML
4 INJECTION INTRAMUSCULAR; INTRAVENOUS EVERY 6 HOURS PRN
Status: DISCONTINUED | OUTPATIENT
Start: 2022-12-25 | End: 2022-12-28 | Stop reason: HOSPADM

## 2022-12-25 RX ORDER — ACETAMINOPHEN 325 MG/1
650 TABLET ORAL EVERY 6 HOURS PRN
Status: DISCONTINUED | OUTPATIENT
Start: 2022-12-25 | End: 2022-12-28 | Stop reason: HOSPADM

## 2022-12-25 RX ORDER — POLYETHYLENE GLYCOL 3350 17 G
2 POWDER IN PACKET (EA) ORAL
Status: DISCONTINUED | OUTPATIENT
Start: 2022-12-25 | End: 2022-12-28 | Stop reason: HOSPADM

## 2022-12-25 RX ORDER — DEXAMETHASONE SODIUM PHOSPHATE 10 MG/ML
10 INJECTION, SOLUTION INTRAMUSCULAR; INTRAVENOUS ONCE
Status: COMPLETED | OUTPATIENT
Start: 2022-12-25 | End: 2022-12-25

## 2022-12-25 RX ORDER — PANTOPRAZOLE SODIUM 40 MG/1
40 TABLET, DELAYED RELEASE ORAL
Status: DISCONTINUED | OUTPATIENT
Start: 2022-12-26 | End: 2022-12-28 | Stop reason: HOSPADM

## 2022-12-25 RX ORDER — SODIUM CHLORIDE 0.9 % (FLUSH) 0.9 %
10 SYRINGE (ML) INJECTION PRN
Status: DISCONTINUED | OUTPATIENT
Start: 2022-12-25 | End: 2022-12-28 | Stop reason: HOSPADM

## 2022-12-25 RX ORDER — SEVELAMER CARBONATE 800 MG/1
800 TABLET, FILM COATED ORAL
Status: DISCONTINUED | OUTPATIENT
Start: 2022-12-26 | End: 2022-12-28 | Stop reason: HOSPADM

## 2022-12-25 RX ORDER — HEPARIN SODIUM 5000 [USP'U]/ML
5000 INJECTION, SOLUTION INTRAVENOUS; SUBCUTANEOUS EVERY 8 HOURS SCHEDULED
Status: DISCONTINUED | OUTPATIENT
Start: 2022-12-25 | End: 2022-12-28 | Stop reason: HOSPADM

## 2022-12-25 RX ORDER — ALBUTEROL SULFATE 90 UG/1
2 AEROSOL, METERED RESPIRATORY (INHALATION) ONCE
Status: COMPLETED | OUTPATIENT
Start: 2022-12-25 | End: 2022-12-25

## 2022-12-25 RX ORDER — FUROSEMIDE 10 MG/ML
40 INJECTION INTRAMUSCULAR; INTRAVENOUS DAILY
Status: DISCONTINUED | OUTPATIENT
Start: 2022-12-25 | End: 2022-12-26

## 2022-12-25 RX ORDER — SODIUM CHLORIDE 9 MG/ML
INJECTION, SOLUTION INTRAVENOUS PRN
Status: DISCONTINUED | OUTPATIENT
Start: 2022-12-25 | End: 2022-12-28 | Stop reason: HOSPADM

## 2022-12-25 RX ORDER — SODIUM CHLORIDE 0.9 % (FLUSH) 0.9 %
5-40 SYRINGE (ML) INJECTION EVERY 12 HOURS SCHEDULED
Status: DISCONTINUED | OUTPATIENT
Start: 2022-12-25 | End: 2022-12-28 | Stop reason: HOSPADM

## 2022-12-25 RX ORDER — 0.9 % SODIUM CHLORIDE 0.9 %
10 VIAL (ML) INJECTION
Status: COMPLETED | OUTPATIENT
Start: 2022-12-25 | End: 2022-12-25

## 2022-12-25 RX ORDER — ONDANSETRON 4 MG/1
4 TABLET, ORALLY DISINTEGRATING ORAL EVERY 8 HOURS PRN
Status: DISCONTINUED | OUTPATIENT
Start: 2022-12-25 | End: 2022-12-28 | Stop reason: HOSPADM

## 2022-12-25 RX ORDER — CARVEDILOL 6.25 MG/1
3.12 TABLET ORAL DAILY
Status: DISCONTINUED | OUTPATIENT
Start: 2022-12-25 | End: 2022-12-28 | Stop reason: HOSPADM

## 2022-12-25 RX ORDER — POLYETHYLENE GLYCOL 3350 17 G/17G
17 POWDER, FOR SOLUTION ORAL DAILY PRN
Status: DISCONTINUED | OUTPATIENT
Start: 2022-12-25 | End: 2022-12-28 | Stop reason: HOSPADM

## 2022-12-25 RX ADMIN — SODIUM CHLORIDE, PRESERVATIVE FREE 10 ML: 5 INJECTION INTRAVENOUS at 23:48

## 2022-12-25 RX ADMIN — IOPAMIDOL 75 ML: 755 INJECTION, SOLUTION INTRAVENOUS at 18:19

## 2022-12-25 RX ADMIN — CEFTRIAXONE SODIUM 1000 MG: 1 INJECTION, POWDER, FOR SOLUTION INTRAMUSCULAR; INTRAVENOUS at 16:49

## 2022-12-25 RX ADMIN — FUROSEMIDE 40 MG: 10 INJECTION, SOLUTION INTRAMUSCULAR; INTRAVENOUS at 23:47

## 2022-12-25 RX ADMIN — ALBUTEROL SULFATE 2 PUFF: 90 AEROSOL, METERED RESPIRATORY (INHALATION) at 14:41

## 2022-12-25 RX ADMIN — Medication 10 ML: at 18:20

## 2022-12-25 RX ADMIN — DEXAMETHASONE SODIUM PHOSPHATE 10 MG: 10 INJECTION INTRAMUSCULAR; INTRAVENOUS at 15:02

## 2022-12-25 RX ADMIN — HEPARIN SODIUM 5000 UNITS: 5000 INJECTION INTRAVENOUS; SUBCUTANEOUS at 23:48

## 2022-12-25 RX ADMIN — Medication 2 PUFF: at 14:41

## 2022-12-25 RX ADMIN — CARVEDILOL 3.12 MG: 6.25 TABLET, FILM COATED ORAL at 23:47

## 2022-12-25 RX ADMIN — AZITHROMYCIN MONOHYDRATE 500 MG: 500 INJECTION, POWDER, LYOPHILIZED, FOR SOLUTION INTRAVENOUS at 18:42

## 2022-12-25 RX ADMIN — ALBUTEROL SULFATE 2 PUFF: 90 AEROSOL, METERED RESPIRATORY (INHALATION) at 23:31

## 2022-12-25 ASSESSMENT — ENCOUNTER SYMPTOMS
ALLERGIC/IMMUNOLOGIC NEGATIVE: 1
SHORTNESS OF BREATH: 1
GASTROINTESTINAL NEGATIVE: 1
EYES NEGATIVE: 1

## 2022-12-25 ASSESSMENT — PAIN SCALES - GENERAL: PAINLEVEL_OUTOF10: 0

## 2022-12-25 ASSESSMENT — PAIN - FUNCTIONAL ASSESSMENT: PAIN_FUNCTIONAL_ASSESSMENT: NONE - DENIES PAIN

## 2022-12-25 NOTE — ED PROVIDER NOTES
I independently examined and evaluated Mary Schumacher. In brief, 72-year-old male with complaint of shortness of breath for several days, COVID-positive on TKA. He does have a history of chronic kidney disease stage IV, on dialysis. He is requiring oxygen which is not his baseline. No pain    Focused exam revealed minimal labored breathing, mild tachypnea, no intercostal muscle use, speaks full sentences. Abdomen soft and nondistended. Regular rate and rhythm. .    ED course: Patient is hypoxic, requiring oxygen, is on nasal cannula and doing well. He is COVID-positive. Plan for admission, significant fluid overload as well likely related to his end-stage renal disease. EKG interpreted by me  Normal sinus rhythm with rate of 84 bpm, normal intervals. No ST elevation. Complete right bundle branch block. All diagnostic, treatment, and disposition decisions were made by myself in conjunction with the advanced practice provider. I personally saw the patient and performed a substantive portion of the visit including all aspects of the medical decision making. For all further details of the patient's emergency department visit, please see the advanced practice provider's documentation. Comment: Please note this report has been produced using speech recognition software and may contain errors related to that system including errors in grammar, punctuation, and spelling, as well as words and phrases that may be inappropriate. If there are any questions or concerns please feel free to contact the dictating provider for clarification.        Trena Lai MD  12/25/22 [id number]

## 2022-12-25 NOTE — H&P
V2.0  History and Physical      Name:  Denisse Carcamo /Age/Sex: 1945  (78 y.o. male)   MRN & CSN:  [id number] & [id number] Encounter Date/Time: 2022 4:58 PM EST   Location:  ED31/ED-31 PCP: Marcos Lopez MD       Hospital Day: 1    Assessment and Plan:   Denisse Carcamo is a 78 y.o. male with a pmh of end-stage renal disease on hemodialysis, melanoma, metastatic melanoma to lung who presents with Acute respiratory failure [facility])    Hospital Problems             Last Modified POA    * (Principal) Acute respiratory failure ([address] 2022 Yes     Acute respiratory failure  COVID 19 viral infection with pneumonia  -Hypoxia with O2 sat 58% in room air  -Currently on 4 L NC  -Admit to medical surgical floor with telemetry  -Steroids and bronchodilator  -Check procalcitonin and CRP  -Will consider antibiotics if procalcitonin elevated, will consider baricitinib if with CRP elevated  -CTA pulmonary to rule out PE due to risk factor of metastatic melanoma and lung and COVID infection    End-stage renal disease  -BNP level 26,000  -Patient still making some urine  -Lasix to 40 Mg IV daily for respiratory failure  -Nephrology consulted for hemodialysis    Elevated troponin  -In setting of end-stage renal disease  -Denied chest pain  -Serial troponin    Lactic acidosis  -Lactic acid elevated 2.2  -White cell 14  -meet the criteria of sepsis, likely due to viral infection and hypoxia  -Check procalcitonin and hold antibiotics for now    History of melanoma  Metastatic melanoma in lung  -Not on treatment  -Outpatient follow-up with oncology    Hypertension  -Continue Coreg    Disposition:   Current Living situation: Home  Expected Disposition: Home  Estimated D/C: 2 to 3 days    Diet No diet orders on file   DVT Prophylaxis [x] Lovenox, []  Heparin, [] SCDs, [] Ambulation,  [] Eliquis, [] Xarelto   Code Status No Order   Surrogate Decision Maker/ POA      History from:     patient    History of Present Illness: Chief Complaint: Shortness of breath  Tess Gutierrez is a 78 y.o. male with pmh of end-stage renal disease on hemodialysis, hypertension, melanoma, metastatic melanoma in lung who presents with hypoxia. Patient stated he was diagnosed of COVID positive on Tuesday (5 days ago). Patient started having shortness of breath yesterday. He checked pulse ox at home this morning and noticed it was 58%. Patient then called ambulance. Patient denied chest pain. Mild cough. No fever. He is currently on 4 L nasal cannula in ED. Chest x-ray indicated bilateral infiltration and consistent with COVID-19 pneumonia. Patient was vaccinated for COVID. He will be admitted to hospital for further evaluation. Patient had lung biopsy on November 5, 2022. He was diagnosed of metastatic melanoma in lung. Dr. Hien Sosa offered immunotherapy. But patient has not started treatment yet. Review of Systems: Need 10 Elements   Review of Systems   Constitutional: Negative. HENT: Negative. Eyes: Negative. Respiratory:  Positive for shortness of breath. Cardiovascular: Negative. Gastrointestinal: Negative. Endocrine: Negative. Genitourinary: Negative. Musculoskeletal: Negative. Skin: Negative. Allergic/Immunologic: Negative. Neurological: Negative. Hematological: Negative. Psychiatric/Behavioral: Negative. Objective:   No intake or output data in the 24 hours ending 12/25/22 1658   Vitals:   Vitals:    12/25/22 1410 12/25/22 1418 12/25/22 1443 12/25/22 1444   BP: (!) 72/69 (!) 158/78     Pulse: 90  84    Resp: 18  18    Temp: 98.9 °F (37.2 °C)      TempSrc: Oral      SpO2: 98% 90% 94% 93%       Medications Prior to Admission     Prior to Admission medications    Medication Sig Start Date End Date Taking?  Authorizing Provider   Amoxicillin 500 MG TABS  12/5/22   Historical Provider, MD   ondansetron (ZOFRAN) 8 MG tablet Take 1 tablet by mouth every 8 hours as needed for Nausea or Vomiting 11/30/22   Wilmer Kruse MD   pantoprazole (PROTONIX) 40 MG tablet  5/11/21   Historical Provider, MD   carvedilol (COREG) 3.125 MG tablet Take 1 tablet by mouth daily 5/11/21   Carolyn Ruby MD   B Complex-C-Folic Acid (SHEA-ABHISHEK) TABS Take 1 tablet by mouth daily    Historical Provider, MD   RENVELA 800 MG tablet 2 Tabs  PO TID WITH MEALS 1/7/17   Historical Provider, MD   nitroGLYCERIN (NITRODUR) 0.2 MG/HR Place 1 patch onto the skin every other day  11/2/16   Historical Provider, MD   isosorbide mononitrate (IMDUR) 30 MG extended release tablet 30 mg daily  11/2/16   Historical Provider, MD       Physical Exam: Need 8 Elements   Physical Exam  HENT:      Head: Normocephalic. Nose: Nose normal.      Mouth/Throat:      Mouth: Mucous membranes are moist.   Eyes:      Pupils: Pupils are equal, round, and reactive to light. Cardiovascular:      Rate and Rhythm: Normal rate and regular rhythm. Pulses: Normal pulses. Pulmonary:      Breath sounds: Rales present. Comments: Mild crackles in bilateral lungs  Abdominal:      General: Abdomen is flat. Musculoskeletal:         General: Normal range of motion. Cervical back: Normal range of motion. Skin:     General: Skin is warm. Capillary Refill: Capillary refill takes less than 2 seconds. Neurological:      General: No focal deficit present. Mental Status: He is alert and oriented to person, place, and time.    Psychiatric:         Mood and Affect: Mood normal.            Past Medical History:   PMHx   Past Medical History:   Diagnosis Date    Abnormal urine     abn urine culture 4/3/2017- OR for 4/13/2017 cancelled-rescheduled for 4/25/2017- ( on 4/24/2017 pt states finishes antibiotic today and had repeat urine culture at office last week)    Chronic kidney disease, stage 4, severely decreased GFR ([facility] [address] 09/2016    Dialysis on Mon-Wed-Fri    follows with Dr Jeana Dale    Nunez catheter in place     Has had in place since 9/29/16 Hearing deficit     both ears --no hearing aids    Hemodialysis patient [facility])     Sees Dr Jonathon Torres    Dialysis on Mon-Wed-Fri    History of blood transfusion     Hypertension     Follows with Dr. Jonathon Torres    Inguinal hernia     Missing teeth, acquired     upper and lower  some broken off now    Wears glasses     for reading     PSHX:  has a past surgical history that includes Dialysis fistula creation (Left, 2016); hernia repair (Right, 2016); Prostate surgery (2017); Axillary Surgery (N/A, 2021); and Abdomen surgery (Right, 2021). Allergies: No Known Allergies  Fam HX: family history includes Diabetes in his brother and mother; Heart Disease in his brother.   Soc HX:   Social History     Socioeconomic History    Marital status: Single     Spouse name: None    Number of children: None    Years of education: None    Highest education level: None   Tobacco Use    Smoking status: Former     Packs/day: 2.00     Years: 30.00     Pack years: 60.00     Types: Cigarettes     Start date:      Quit date: 4/3/1992     Years since quittin.7    Smokeless tobacco: Never   Vaping Use    Vaping Use: Never used   Substance and Sexual Activity    Alcohol use: No     Comment: quit 2.5yrs ago--used to drink 3-4 beers daily    Drug use: No       Medications:   Medications:    azithromycin  500 mg IntraVENous Once    cefTRIAXone (ROCEPHIN) IV  1,000 mg IntraVENous Once      Infusions:   PRN Meds:      Labs      CBC:   Recent Labs     22  1453   WBC 14.7*   HGB 12.1*        BMP:    Recent Labs     22  1453   *   K 3.9   CL 91*   CO2 26   BUN 35*   CREATININE 4.7*   GLUCOSE 99     Hepatic:   Recent Labs     22  1453   AST 28   ALT 16   BILITOT 1.0   ALKPHOS 86     Lipids: No results found for: CHOL, HDL, TRIG  Hemoglobin A1C: No results found for: LABA1C  TSH: No results found for: TSH  Troponin:   Lab Results   Component Value Date/Time    TROPONINT 0.037 2022 02:53 PM Lactic Acid: No results for input(s): LACTA in the last 72 hours. BNP:   Recent Labs     12/25/22  1453   PROBNP 26,973*     UA:  Lab Results   Component Value Date/Time    NITRU NEGATIVE 09/05/2019 02:00 PM    COLORU YELLOW 09/05/2019 02:00 PM    WBCUA 1 09/05/2019 02:00 PM    RBCUA <1 09/05/2019 02:00 PM    TRICHOMONAS NONE SEEN 09/05/2019 02:00 PM    BACTERIA NEGATIVE 09/05/2019 02:00 PM    CLARITYU CLEAR 09/05/2019 02:00 PM    SPECGRAV 1.010 09/05/2019 02:00 PM    LEUKOCYTESUR NEGATIVE 09/05/2019 02:00 PM    UROBILINOGEN NORMAL 09/05/2019 02:00 PM    BILIRUBINUR NEGATIVE 09/05/2019 02:00 PM    BLOODU NEGATIVE 09/05/2019 02:00 PM    [address] 09/05/2019 02:00 PM     Urine Cultures: No results found for: LABURIN  Blood Cultures: No results found for: BC  No results found for: BLOODCULT2  Organism:   Lab Results   Component Value Date/Time    [facility] PRRT 04/03/2017 12:35 PM    ORG EECOL 04/03/2017 12:35 PM       Imaging/Diagnostics Last 24 Hours   XR CHEST PORTABLE    Result Date: 12/25/2022  EXAMINATION: ONE X-RAY VIEW OF THE CHEST 12/25/2022 2:22 pm COMPARISON: 09/08/2022 HISTORY: ORDERING SYSTEM PROVIDED HISTORY: chest pain TECHNOLOGIST PROVIDED HISTORY: Reason for exam:->chest pain Reason for Exam: chest pain, COVID+ FINDINGS: The heart is normal size and configuration. The previously described right upper lobe mass is not evident on the current exam.  Bilateral lung infiltrates are present worrisome for COVID-19 pneumonia given the patient's clinical history. No pleural effusion is seen. No osseous abnormality is noted. Bilateral lung infiltrates highly concerning for COVID-19 pneumonia given the patient's clinical history. Nonvisualization of the previously described right upper lobe mass.        Personally reviewed Lab Studies, Imaging, and discussed case with dr. Mis Amato    Electronically signed by Vega Leonard CNP on 12/25/2022 at 4:58 PM

## 2022-12-25 NOTE — ED PROVIDER NOTES
EMERGENCY DEPARTMENT ENCOUNTER    [facility] EMERGENCY DEPARTMENT        TRIAGE CHIEF COMPLAINT:   Positive For Covid-19      Qagan Tayagungin:  Helen Mayfield is a 78 y.o. male that presents via EMS from home for hypoxia and shortness of breath. Context is patient tested positive for COVID-19 this past Tuesday. This is his first time having COVID-19 illness, was vaccinated prior to illness. Patient states that he had low oxygen readings at home. Upon EMS arrival, was mid 60%  on room air was placed onto nonrebreather but no obvious increased work of breathing or wheezing. Was not given any treatments on route. Upon arrival to the ED, patient's nonrebreather was stopped and he did drop to 86% with worsening shortness of breath. No chest pain. Currently on 4 L nasal cannula which is a new oxygen requirement. Was started on a Z-John by PCP. Past medical history includes chronic kidney disease on dialysis, pretension, hyperlipidemia. No history of COPD. Has had an intermittent cough and states \"my chest feels congested. \"  No hemoptysis. No calf pain or swelling. No other dizziness lightheadedness nausea or vomiting or body aches. No recent fever or chills.     ROS:  General:  No fevers, no chills,   Cardiovascular:  No chest pain, no palpitations  Respiratory:  See HPI    Gastrointestinal:  No pain, no nausea, no vomiting, no diarrhea  Musculoskeletal:  No muscle pain, no joint pain  Skin:  No rash, no pruritis, no easy bruising  Neurologic:  No speech problems, no headache, no extremity numbness, no extremity tingling, no extremity weakness  Psychiatric:  No anxiety  Genitourinary:  No dysuria, no hematuria  Extremities:  No edema    Past Medical History:   Diagnosis Date    Abnormal urine     abn urine culture 4/3/2017- OR for 4/13/2017 cancelled-rescheduled for 4/25/2017- ( on 4/24/2017 pt states finishes antibiotic today and had repeat urine culture at office last week) Chronic kidney disease, stage 4, severely decreased GFR (HCC) 2016    Dialysis on     follows with Dr Cathy Duran    Nunez catheter in place     Has had in place since 16    Hearing deficit     both ears --no hearing aids    Hemodialysis patient [facility])     Sees Dr Cathy Duran    Dialysis on     History of blood transfusion     Hypertension     Follows with Dr. Cathy Duran    Inguinal hernia     Missing teeth, acquired     upper and lower  some broken off now    Wears glasses     for reading     Past Surgical History:   Procedure Laterality Date    ABDOMEN SURGERY Right 2021    RIGHT BACK EXCISION 5.8 CM MELANOMA (3.1 MM DEPTH) WITH LOCAL FLAP performed by Idris Beavers MD at [address] [facility] N/A 2021    SENTINEL LYMPHNODE BIOPSY RIGHT AXILLA performed by Maria Del Carmen Soto MD at [address] Left 2016    Left upper arm    HERNIA REPAIR Right 2016    INGUINAL HERNIA    PROSTATE SURGERY  2017     Family History   Problem Relation Age of Onset    Diabetes Mother     Diabetes Brother     Heart Disease Brother      Social History     Socioeconomic History    Marital status: Single     Spouse name: Not on file    Number of children: Not on file    Years of education: Not on file    Highest education level: Not on file   Occupational History    Not on file   Tobacco Use    Smoking status: Former     Packs/day: 2.00     Years: 30.00     Pack years: 60.00     Types: Cigarettes     Start date:      Quit date: 4/3/1992     Years since quittin.7    Smokeless tobacco: Never   Vaping Use    Vaping Use: Never used   Substance and Sexual Activity    Alcohol use: No     Comment: quit 2.5yrs ago--used to drink 3-4 beers daily    Drug use: No    Sexual activity: Not on file   Other Topics Concern    Not on file   Social History Narrative    Not on file     Social Determinants of Health     Financial Resource Strain: Not on file   Food Insecurity: Not on file   Transportation Needs: Not on file   Physical Activity: Not on file   Stress: Not on file   Social Connections: Not on file   Intimate Partner Violence: Not on file   Housing Stability: Not on file     Current Facility-Administered Medications   Medication Dose Route Frequency Provider Last Rate Last Admin    azithromycin (ZITHROMAX) 500 mg in dextrose 5 % 250 mL IVPB ([id number])  500 mg IntraVENous Once Lenor ALONDRA Snowden-C        cefTRIAXone (ROCEPHIN) 1,000 mg in dextrose 5 % 50 mL IVPB mini-bag  1,000 mg IntraVENous Once Tayor NAT Snowden 100 mL/hr at 12/25/22 1649 1,000 mg at 12/25/22 1649     Current Outpatient Medications   Medication Sig Dispense Refill    Amoxicillin 500 MG TABS       ondansetron (ZOFRAN) 8 MG tablet Take 1 tablet by mouth every 8 hours as needed for Nausea or Vomiting 90 tablet 3    pantoprazole (PROTONIX) 40 MG tablet       carvedilol (COREG) 3.125 MG tablet Take 1 tablet by mouth daily 180 tablet 3    B Complex-C-Folic Acid (SHEA-ABHISHEK) TABS Take 1 tablet by mouth daily      RENVELA 800 MG tablet 2 Tabs  PO TID WITH MEALS  2    nitroGLYCERIN (NITRODUR) 0.2 MG/HR Place 1 patch onto the skin every other day   0    isosorbide mononitrate (IMDUR) 30 MG extended release tablet 30 mg daily   0     No Known Allergies    Nursing Notes Reviewed  PHYSICAL EXAM    VITAL SIGNS: BP (!) 158/78   Pulse 84   Temp 98.9 °F (37.2 °C) (Oral)   Resp 18   SpO2 93%    Constitutional:  Well developed, Well nourished, In no acute distress  Head:  Normocephalic, Atraumatic  Eyes:  EOMI. Sclera clear. Conjunctiva normal, No discharge. Neck/Lymphatics: Supple, no JVD, No lymphadenopathy  Cardiovascular:  RRR, Normal S1 & S2     Peripheral Vascular: Distal pulses 2+, Capillary refill <2seconds  Respiratory:  Respirations nonnlabored, speaking full sentences without difficulty. 92% on 4 L nasal cannula.   Diminished air exchange bilaterally, very minimal expiratory wheezing in the lower lung bases. No other rales retractions or accessory muscle use. Abdomen: Bowel sounds normal in all quadrants, Soft, Non tender/Nondistended, No palpable abdominal masses. Musculoskeletal BUE/BLE symmetrical without atrophy or deformities  Integument:  Warm, Dry, Intact, Skin turgor and texture normal  Neurologic:  Alert & oriented x3 , No focal deficits noted. Cranial nerves II through XII grossly intact. No slurred speech. No facial droop. Normal gross motor coordination & motor strength bilateral upper and lower extremities.     Psychiatric:  Affect appropriate      I have reviewed and interpreted all of the currently available lab results from this visit (if applicable):  Results for orders placed or performed during the hospital encounter of 12/25/22   CBC with Auto Differential   Result Value Ref Range    WBC 14.7 (H) 4.0 - 10.5 K/CU MM    RBC 4.13 (L) 4.6 - 6.2 M/CU MM    Hemoglobin 12.1 (L) 13.5 - 18.0 GM/DL    Hematocrit 36.8 (L) 42 - 52 %    MCV 89.1 78 - 100 FL    MCH 29.3 27 - 31 PG    MCHC 32.9 32.0 - 36.0 %    RDW 12.8 11.7 - 14.9 %    Platelets 604 507 - 084 K/CU MM    MPV 10.0 7.5 - 11.1 FL    Differential Type AUTOMATED DIFFERENTIAL     Segs Relative 90.6 (H) 36 - 66 %    Lymphocytes % 3.3 (L) 24 - 44 %    Monocytes % 5.2 (H) 0 - 4 %    Eosinophils % 0.0 0 - 3 %    Basophils % 0.3 0 - 1 %    Segs Absolute 13.3 K/CU MM    Lymphocytes Absolute 0.5 K/CU MM    Monocytes Absolute 0.8 K/CU MM    Eosinophils Absolute 0.0 K/CU MM    Basophils Absolute 0.0 K/CU MM    Nucleated RBC % 0.0 %    Total Nucleated RBC 0.0 K/CU MM    TRC 0.0727 K/CU MM    Total Immature Neutrophil 0.09 K/CU MM    Immature Neutrophil % 0.6 (H) 0 - 0.43 %   Brain Natriuretic Peptide   Result Value Ref Range    Pro-BNP 26,973 (H) <300 PG/ML   Blood Gas, Venous   Result Value Ref Range    pH, William 7.47 (H) 7.32 - 7.42    pCO2, William 41 38 - 52 mmHG    pO2, William 25 (L) 28 - 48 mmHG    Base Exc, Mixed 5.6 (H) 0 - 1.2    HCO3, Venous 29.8 (H) 19 - 25 MMOL/L    O2 Sat, William 41.5 (L) 50 - 70 %    Comment VBG    Lactic Acid   Result Value Ref Range    Lactate 2.2 (HH) 0.5 - 1.9 mMOL/L   EKG 12 Lead   Result Value Ref Range    Ventricular Rate 84 BPM    Atrial Rate 84 BPM    P-R Interval 174 ms    QRS Duration 110 ms    Q-T Interval 390 ms    QTc Calculation (Bazett) 460 ms    P Axis 48 degrees    R Axis -48 degrees    T Axis 24 degrees    Diagnosis       Poor data quality, interpretation may be adversely affected  Normal sinus rhythm  Left axis deviation  Incomplete right bundle branch block  Moderate voltage criteria for LVH, may be normal variant  Junctional ST depression, probably abnormal  Abnormal ECG  When compared with ECG of 03-APR-2017 12:23,  Incomplete right bundle branch block has replaced Right bundle branch block          Radiographs (if obtained):  [] The following radiograph was interpreted by myself in the absence of a radiologist:   [] Radiologist's Report Reviewed:  XR CHEST PORTABLE   Final Result   Bilateral lung infiltrates highly concerning for COVID-19 pneumonia given the   patient's clinical history. Nonvisualization of the previously described right upper lobe mass. XR CHEST PORTABLE (Preliminary result)  Result time 12/25/22 15:05:41  Preliminary result by Almas James MD (12/25/22 15:05:41)                Impression:    Bilateral lung infiltrates highly concerning for COVID-19 pneumonia given the   patient's clinical history. Nonvisualization of the previously described right upper lobe mass. EKG Interpretation  Please see ED physician's note - Dr. David Mcintosh - for EKG interpretation        Chart review shows recent radiographs:  No results found. ED COURSE & MEDICAL DECISION MAKING       Vital signs and nursing notes reviewed during ED course. I have independently evaluated this patient .  Supervising physician - Dr. David Mcintosh - was present in ED and available for consultation throughout entirety of patient's care. All pertinent Lab data and radiographic results reviewed with patient at bedside. The patient and/or the family were informed of the results of any tests/labs/imaging, the treatment plan, and time was allotted to answer questions. Clinical Impression:  1. Acute hypoxemic respiratory failure due to COVID-19 ([facility] [address]    2. Pneumonia due to COVID-19 virus    3. ESRD (end stage renal disease) on dialysis [facility])        Patient presents for shortness of breath, cough and proxy with recent COVID-19 diagnosis. On my exam, patient is currently on 4 L nasal cannula, 93%. Otherwise speaking full sentences, resting comfortably. No stridor but very minimal scattered wheezing noted diminished air exchange. No rales or evidence of fluid overload. Abdomen soft nontender. Patient currently asymptomatic for any chest pain. On telemetry continuous pulse ox monitoring. Given IV Decadron, albuterol/Atrovent inhaler. CBC shows leukocytosis of 14.7, hemoglobin 12.1. CMP reveals BUN 35, creatinine 4.7 with normal potassium. Troponin just below 0.037, likely secondary to his chronic kidney disease. BNP is elevated 29577 however patient does not appear clinically fluid overloaded, no past medical history for CHF. Patient did have last full dialysis yesterday. Lactic detectable 2.2. Venous pH shows PCO2 41 with normal pH of 7.47. EKG reveals no evidence of acute ischemic features. Chest x-ray shows bilateral lung infiltrates concerning for COVID-pneumonia with nonvisualization of previous described right upper lobe mass. Did add on blood cultures, patient started empiric IV antibiotics Rocephin and azithromycin. Given his new oxygen requirements, likely secondary to COVID-19 infection/pneumonia, do feel admission is warranted for further evaluation and care. Patient is comfortable and agreeable this plan.   We will plan to talk to his nephrologist as patient will likely require dialysis while admitted and admit to hospitalist service. I did speak with Dr. Av Jackson who is agreeable with plan for admission to hospitalist service. Patient receives dialysis Tuesday Thursday and Saturday. I did consult with Bobby Sutton CNP - hospitalist - and discussed patient's history, ED presentation/course including any pertinent laboratory findings and imaging study findings. He/she recommends agrees to hospital admission. Patient is admitted to the hospital in stable condition. I did discuss this patient's history, ED presentation/course with my attending physician - Dr. Darlene Tam - who has also seen and evaluated this patient. He/she does agree that admission is reasonable at this time. Please see his/her note for additional details of their evaluation. CRITICAL CARE NOTE:  There was a high probability of clinically significant life-threatening deterioration of the patient's condition requiring my urgent intervention due to COVID-19 acute respiratory failure/hypoxia. Telemetry, continuous pulse ox monitoring, IV steroids, albuterol/Atrovent inhalers, chart review, consultation with specialty services, broad-spectrum antibiotics, was performed to address this. Total critical care time is at least  31 minutes. This includes vital sign monitoring, pulse oximetry monitoring, telemetry monitoring, clinical response to the IV medications, reviewing the nursing notes, consultation time, dictation/documentation time, and interpretation of the lab work. This time excludes time spent performing procedures and separately billable procedures and family discussion time. Diagnosis and plan discussed in detail with patient who understands and agrees. Disposition referral (if applicable):  No follow-up provider specified.     Disposition medications (if applicable):  New Prescriptions    No medications on file         (Please note that portions of this note may have been completed with a voice recognition program. Efforts were made to edit the dictations but occasionally words are mis-transcribed.)          Renata Flores PA-C  12/25/22 [id number]

## 2022-12-25 NOTE — ED NOTES
ED TO INPATIENT SBAR HANDOFF    Patient Name: Chris Smalls   :  1945  78 y.o. MRN:  [id number]  Preferred Name  jeffry  ED Room #:  ED31/ED-31  Family/Caregiver Present yes   Restraints no   Sitter no   Sepsis Risk Score Sepsis Risk Score: 1.87    Situation  Code Status: No Order No additional code details. Allergies: Patient has no known allergies. Weight: No data found. Arrived from: home  Chief Complaint:   Chief Complaint   Patient presents with   [address] Problem/Diagnosis:  Active Problems:    * No active hospital problems. *  Resolved Problems:    * No resolved hospital problems. *    Imaging:   XR CHEST PORTABLE   Final Result   Bilateral lung infiltrates highly concerning for COVID-19 pneumonia given the   patient's clinical history. Nonvisualization of the previously described right upper lobe mass.            Abnormal labs:   Abnormal Labs Reviewed   RESPIRATORY PANEL, MOLECULAR, WITH COVID-19 - Abnormal; Notable for the following components:       Result Value    SARS-CoV-2 DETECTED BY PCR (*)     All other components within normal limits   CBC WITH AUTO DIFFERENTIAL - Abnormal; Notable for the following components:    WBC 14.7 (*)     RBC 4.13 (*)     Hemoglobin 12.1 (*)     Hematocrit 36.8 (*)     Segs Relative 90.6 (*)     Lymphocytes % 3.3 (*)     Monocytes % 5.2 (*)     Immature Neutrophil % 0.6 (*)     All other components within normal limits   COMPREHENSIVE METABOLIC PANEL - Abnormal; Notable for the following components:    Sodium 133 (*)     Chloride 91 (*)     BUN 35 (*)     Creatinine 4.7 (*)     Est, Glom Filt Rate 12 (*)     All other components within normal limits   TROPONIN - Abnormal; Notable for the following components:    Troponin T 0.037 (*)     All other components within normal limits   BRAIN NATRIURETIC PEPTIDE - Abnormal; Notable for the following components:    Pro-BNP 26,973 (*)     All other components within normal limits BLOOD GAS, VENOUS - Abnormal; Notable for the following components:    pH, William 7.47 (*)     pO2, William 25 (*)     Base Exc, Mixed 5.6 (*)     HCO3, Venous 29.8 (*)     O2 Sat, William 41.5 (*)     All other components within normal limits   LACTIC ACID - Abnormal; Notable for the following components:    Lactate 2.2 (*)     All other components within normal limits     Critical values: no     Abnormal Assessment Findings: lactate 2.2, Covid positive.  New oxygen requirement at 4L NC    Background  History:   Past Medical History:   Diagnosis Date    Abnormal urine     abn urine culture 4/3/2017- OR for 4/13/2017 cancelled-rescheduled for 4/25/2017- ( on 4/24/2017 pt states finishes antibiotic today and had repeat urine culture at office last week)    Chronic kidney disease, stage 4, severely decreased GFR ([facility] [address] 09/2016    Dialysis on Mon-Wed-Fri    follows with Dr Dallas Tafoya Nunez catheter in place     Has had in place since 9/29/16    Hearing deficit     both ears --no hearing aids    Hemodialysis patient [facility])     Sees Dr Ricky Laughlin    Dialysis on Mon-Wed-Fri    History of blood transfusion     Hypertension     Follows with Dr. Dallas Tafoya Inguinal hernia     Missing teeth, acquired     upper and lower  some broken off now    Wears glasses     for reading       Assessment    Vitals/MEWS:        Vitals:    12/25/22 1410 12/25/22 1418 12/25/22 1443 12/25/22 1444   BP: (!) 72/69 (!) 158/78     Pulse: 90  84    Resp: 18  18    Temp: 98.9 °F (37.2 °C)      TempSrc: Oral      SpO2: 98% 90% 94% 93%     FiO2 (%): nasal canula, 4L   O2 Flow Rate: O2 Device: Nasal cannula O2 Flow Rate (L/min): 4 L/min  Cardiac Rhythm:    Pain Assessment: 0 [] Verbal [] Penne Day Scale  Pain Scale:    Last documented pain score (0-10 scale)    Last documented pain medication administered:   Mental Status: oriented, alert, coherent, logical and thought processes intact  NIH Score:    C-SSRS: Risk of Suicide: No Risk  Bedside swallow: Ata Coma Scale (GCS): [place] Coma Scale  Eye Opening: Spontaneous  Best Verbal Response: Oriented  Best Motor Response: Obeys commands  Durant Coma Scale Score: 15  Active LDA's:   Peripheral IV 12/25/22 Right Antecubital (Active)   Site Assessment Clean, dry & intact 12/25/22 1456   Line Status Blood return noted 12/25/22 1456     PO Status: Regular  Pertinent or High Risk Medications/Drips: no   o If Yes, please provide details:   Pending Blood Product Administration: no     You may also review the ED PT Care Timeline found under the Summary Nursing Index tab. Recommendation    Pending orders   Plan for Discharge (if known): Additional Comments: pt is pleasant, A&O x4 but [place]. Pt arrived from home for SOB x several days. Pt is now requiring 4L NC, pt does not normally wear O2.  Pt has fistula in left arm, last dialysis tx was yesterday    If any further questions, please call Sending RN at [employer]    Electronically signed by: Electronically signed by Sallie Gallo RN on 12/25/2022 at 4:56 PM     Sallie Gallo RN  12/25/22 0737

## 2022-12-26 LAB
ANION GAP SERPL CALCULATED.3IONS-SCNC: 13 MMOL/L (ref 4–16)
BUN BLDV-MCNC: 48 MG/DL (ref 6–23)
CALCIUM SERPL-MCNC: 8.8 MG/DL (ref 8.3–10.6)
CHLORIDE BLD-SCNC: 91 MMOL/L (ref 99–110)
CO2: 25 MMOL/L (ref 21–32)
CREAT SERPL-MCNC: 5 MG/DL (ref 0.9–1.3)
EKG ATRIAL RATE: 84 BPM
EKG DIAGNOSIS: NORMAL
EKG P AXIS: 48 DEGREES
EKG P-R INTERVAL: 174 MS
EKG Q-T INTERVAL: 390 MS
EKG QRS DURATION: 110 MS
EKG QTC CALCULATION (BAZETT): 460 MS
EKG R AXIS: -48 DEGREES
EKG T AXIS: 24 DEGREES
EKG VENTRICULAR RATE: 84 BPM
GFR SERPL CREATININE-BSD FRML MDRD: 11 ML/MIN/1.73M2
GLUCOSE BLD-MCNC: 158 MG/DL (ref 70–99)
HIGH SENSITIVE C-REACTIVE PROTEIN: 213.6 MG/L (ref 0–5)
HIGH SENSITIVE C-REACTIVE PROTEIN: 214.7 MG/L (ref 0–5)
LACTATE: 1.2 MMOL/L (ref 0.5–1.9)
POTASSIUM SERPL-SCNC: 4 MMOL/L (ref 3.5–5.1)
PROCALCITONIN: 28.36
SODIUM BLD-SCNC: 129 MMOL/L (ref 135–145)

## 2022-12-26 PROCEDURE — 2580000003 HC RX 258: Performed by: NURSE PRACTITIONER

## 2022-12-26 PROCEDURE — 6360000002 HC RX W HCPCS: Performed by: NURSE PRACTITIONER

## 2022-12-26 PROCEDURE — 86141 C-REACTIVE PROTEIN HS: CPT

## 2022-12-26 PROCEDURE — 80048 BASIC METABOLIC PNL TOTAL CA: CPT

## 2022-12-26 PROCEDURE — 90935 HEMODIALYSIS ONE EVALUATION: CPT

## 2022-12-26 PROCEDURE — 6370000000 HC RX 637 (ALT 250 FOR IP): Performed by: NURSE PRACTITIONER

## 2022-12-26 PROCEDURE — 5A1D70Z PERFORMANCE OF URINARY FILTRATION, INTERMITTENT, LESS THAN 6 HOURS PER DAY: ICD-10-PCS | Performed by: INTERNAL MEDICINE

## 2022-12-26 PROCEDURE — 93010 ELECTROCARDIOGRAM REPORT: CPT | Performed by: INTERNAL MEDICINE

## 2022-12-26 PROCEDURE — 94761 N-INVAS EAR/PLS OXIMETRY MLT: CPT

## 2022-12-26 PROCEDURE — 94640 AIRWAY INHALATION TREATMENT: CPT

## 2022-12-26 PROCEDURE — 1200000000 HC SEMI PRIVATE

## 2022-12-26 PROCEDURE — 94664 DEMO&/EVAL PT USE INHALER: CPT

## 2022-12-26 PROCEDURE — 2700000000 HC OXYGEN THERAPY PER DAY

## 2022-12-26 PROCEDURE — 83605 ASSAY OF LACTIC ACID: CPT

## 2022-12-26 PROCEDURE — 36415 COLL VENOUS BLD VENIPUNCTURE: CPT

## 2022-12-26 RX ORDER — AZITHROMYCIN 250 MG/1
500 TABLET, FILM COATED ORAL DAILY
Status: DISCONTINUED | OUTPATIENT
Start: 2022-12-26 | End: 2022-12-26

## 2022-12-26 RX ORDER — AZITHROMYCIN 250 MG/1
500 TABLET, FILM COATED ORAL DAILY
Status: DISCONTINUED | OUTPATIENT
Start: 2022-12-26 | End: 2022-12-28

## 2022-12-26 RX ADMIN — SEVELAMER CARBONATE 800 MG: 800 TABLET, FILM COATED ORAL at 18:24

## 2022-12-26 RX ADMIN — ALBUTEROL SULFATE 2 PUFF: 90 AEROSOL, METERED RESPIRATORY (INHALATION) at 04:05

## 2022-12-26 RX ADMIN — SEVELAMER CARBONATE 800 MG: 800 TABLET, FILM COATED ORAL at 13:52

## 2022-12-26 RX ADMIN — ALBUTEROL SULFATE 2 PUFF: 90 AEROSOL, METERED RESPIRATORY (INHALATION) at 07:28

## 2022-12-26 RX ADMIN — HEPARIN SODIUM 5000 UNITS: 5000 INJECTION INTRAVENOUS; SUBCUTANEOUS at 06:09

## 2022-12-26 RX ADMIN — PANTOPRAZOLE SODIUM 40 MG: 40 TABLET, DELAYED RELEASE ORAL at 06:09

## 2022-12-26 RX ADMIN — SEVELAMER CARBONATE 800 MG: 800 TABLET, FILM COATED ORAL at 09:04

## 2022-12-26 RX ADMIN — ALBUTEROL SULFATE 2 PUFF: 90 AEROSOL, METERED RESPIRATORY (INHALATION) at 11:09

## 2022-12-26 RX ADMIN — SODIUM CHLORIDE, PRESERVATIVE FREE 10 ML: 5 INJECTION INTRAVENOUS at 09:05

## 2022-12-26 RX ADMIN — SODIUM CHLORIDE, PRESERVATIVE FREE 10 ML: 5 INJECTION INTRAVENOUS at 20:31

## 2022-12-26 RX ADMIN — HEPARIN SODIUM 5000 UNITS: 5000 INJECTION INTRAVENOUS; SUBCUTANEOUS at 13:52

## 2022-12-26 RX ADMIN — ALBUTEROL SULFATE 2 PUFF: 90 AEROSOL, METERED RESPIRATORY (INHALATION) at 20:49

## 2022-12-26 RX ADMIN — CARVEDILOL 3.12 MG: 6.25 TABLET, FILM COATED ORAL at 08:57

## 2022-12-26 RX ADMIN — DEXAMETHASONE SODIUM PHOSPHATE 6 MG: 10 INJECTION INTRAMUSCULAR; INTRAVENOUS at 08:58

## 2022-12-26 RX ADMIN — HEPARIN SODIUM 5000 UNITS: 5000 INJECTION INTRAVENOUS; SUBCUTANEOUS at 20:30

## 2022-12-26 ASSESSMENT — ENCOUNTER SYMPTOMS
EYE PAIN: 0
ABDOMINAL DISTENTION: 0
TROUBLE SWALLOWING: 0
EYE ITCHING: 0
SORE THROAT: 0
COUGH: 1
RHINORRHEA: 0
VOICE CHANGE: 0
BACK PAIN: 0
SHORTNESS OF BREATH: 1
CONSTIPATION: 0
NAUSEA: 0
CHEST TIGHTNESS: 0
PHOTOPHOBIA: 0
ABDOMINAL PAIN: 0
VOMITING: 0
SINUS PAIN: 0
DIARRHEA: 0
EYE REDNESS: 0
BLOOD IN STOOL: 0
EYE DISCHARGE: 0

## 2022-12-26 ASSESSMENT — PAIN SCALES - GENERAL
PAINLEVEL_OUTOF10: 0

## 2022-12-26 NOTE — PROGRESS NOTES
Received from ER to  2011. Alert & oriented, [facility]. Dialysis pt with an AV fistula left arm, +bruit +thrill. Verbalized understanding of safety including using his call light for assistance.

## 2022-12-26 NOTE — PROGRESS NOTES
In-Patient Progress Note    Patient:  Radha Stapleton 78 y.o. male MRN: [id number]     Date of Service: 12/26/2022    Hospital Day: 2      Chief complaint: had concerns including Positive For Covid-19. Assessment and Plan   Radha Stapleton, a 78 y.o. male, with a history of end-stage renal disease on hemodialysis, melanoma, metastatic melanoma to lung, was admitted on 12/25/2022 with complaints of had concerns including Positive For Covid-19. Assessment and plan    Acute hypoxic respiratory failure  COVID 19 viral infection   Secondary bacterial pneumonia  -Hypoxia with O2 sat 58% in room air  -Currently on 6 L NC  -dexamethasone and bronchodilator  -Procal 28.36 - trend  -Cover with ceftriaxnone and azithro to cover CAP for now  -gram variable bacilli in blood culture - awaiting identification  -Baricitinib not indicated in view for ESRD CrCl < 15  -CTA pulmonary to rule out PE due to risk factor of metastatic melanoma and lung and COVID infection     End-stage renal disease  -BNP level 26,000  -Patient ?anuric per nephro - no diuresis  -Nephrology onboard     Elevated troponin  -In setting of end-stage renal disease  -Denied chest pain  -Serial troponin     Lactic acidosis  -Lactic acid elevated 2.2  -White cell 14  -meet the criteria of sepsis, likely due to viral infection and hypoxia  -repeat LA     History of melanoma  Metastatic melanoma in lung  -Not on treatment  -Outpatient follow-up with oncology     Hypertension  -Continue Coreg      # Peptic ulcer prophylaxis: pantoprazole  # DVT Prophylaxis: heparin  #CODE STATUS: full code      Current living situation: home  Expected Disposition: home  Estimated discharge date: 3-4 days      Review of System     Review of Systems   Constitutional:  Negative for activity change, appetite change, chills, fatigue and fever.    HENT:  Negative for congestion, ear discharge, ear pain, hearing loss, nosebleeds, postnasal drip, rhinorrhea, sinus pain, sore throat, trouble swallowing and voice change. Eyes:  Negative for photophobia, pain, discharge, redness and itching. Respiratory:  Positive for cough and shortness of breath. Negative for chest tightness. Cardiovascular:  Negative for chest pain, palpitations and leg swelling. Gastrointestinal:  Negative for abdominal distention, abdominal pain, blood in stool, constipation, diarrhea, nausea and vomiting. Endocrine: Negative for cold intolerance, heat intolerance, polydipsia, polyphagia and polyuria. Genitourinary:  Negative for difficulty urinating, dysuria, flank pain, frequency, hematuria and urgency. Musculoskeletal:  Negative for arthralgias, back pain, gait problem, joint swelling and myalgias. Skin:  Negative for pallor, rash and wound. Allergic/Immunologic: Negative for environmental allergies and food allergies. Neurological:  Negative for dizziness, tremors, seizures, syncope, speech difficulty, weakness, light-headedness, numbness and headaches. Hematological:  Negative for adenopathy. Does not bruise/bleed easily. Psychiatric/Behavioral:  Negative for agitation, confusion, decreased concentration, hallucinations, self-injury, sleep disturbance and suicidal ideas. The patient is not nervous/anxious and is not hyperactive. I have reviewed all pertinent PMHx, PSHx, FamHx, SocialHx, medications, and allergies and updated history as appropriate.     Physical Exam   VITAL SIGNS:  /65   Pulse 62   Temp 97.3 °F (36.3 °C) (Axillary)   Resp 17   Ht 5' 8\" (1.727 m)   Wt 183 lb 12.8 oz (83.4 kg)   SpO2 93%   BMI 27.95 kg/m²   Tmax over 24 hours:  Temp (24hrs), Av.7 °F (36.5 °C), Min:97.3 °F (36.3 °C), Max:98.2 °F (36.8 °C)      Patient Vitals for the past 6 hrs:   BP Temp Temp src Pulse Resp SpO2 Height Weight   22 1202 134/65 97.3 °F (36.3 °C) Axillary 62 17 93 % -- --   22 1109 -- -- -- 61 18 96 % -- --   22 1000 (!) 122/56 -- -- 60 24 91 % -- -- 12/26/22 0900 139/60 97.4 °F (36.3 °C) Oral 61 21 95 % 5' 8\" (1.727 m) 183 lb 12.8 oz (83.4 kg)         Intake/Output Summary (Last 24 hours) at 12/26/2022 1442  Last data filed at 12/26/2022 0515  Gross per 24 hour   Intake 240 ml   Output 300 ml   Net -60 ml     Wt Readings from Last 2 Encounters:   12/26/22 183 lb 12.8 oz (83.4 kg)   12/09/22 138 lb 6.4 oz (62.8 kg)     Body mass index is 27.95 kg/m². Physical Exam  Constitutional:       General: He is not in acute distress. Appearance: Normal appearance. He is normal weight. He is not ill-appearing, toxic-appearing or diaphoretic. HENT:      Head: Normocephalic and atraumatic. Right Ear: Tympanic membrane, ear canal and external ear normal. There is no impacted cerumen. Left Ear: Tympanic membrane, ear canal and external ear normal. There is no impacted cerumen. Nose: Nose normal. No congestion or rhinorrhea. Mouth/Throat:      Mouth: Mucous membranes are moist.      Pharynx: No oropharyngeal exudate or posterior oropharyngeal erythema. Eyes:      General: No scleral icterus. Right eye: No discharge. Left eye: No discharge. Extraocular Movements: Extraocular movements intact. Conjunctiva/sclera: Conjunctivae normal.      Pupils: Pupils are equal, round, and reactive to light. Neck:      Vascular: No carotid bruit. Cardiovascular:      Rate and Rhythm: Normal rate and regular rhythm. Pulses: Normal pulses. Heart sounds: Normal heart sounds. No murmur heard. No friction rub. No gallop. Pulmonary:      Effort: Pulmonary effort is normal. No respiratory distress. Breath sounds: Normal breath sounds. No stridor. No wheezing or rhonchi. Abdominal:      General: Abdomen is flat. Bowel sounds are normal. There is no distension. Palpations: Abdomen is soft. Tenderness: There is no abdominal tenderness.    Musculoskeletal:         General: No swelling, tenderness, deformity or signs of injury. Normal range of motion. Cervical back: Normal range of motion and neck supple. No rigidity or tenderness. Right lower leg: No edema. Left lower leg: No edema. Lymphadenopathy:      Cervical: No cervical adenopathy. Skin:     General: Skin is warm. Capillary Refill: Capillary refill takes less than 2 seconds. Coloration: Skin is not jaundiced or pale. Findings: No bruising or erythema. Neurological:      General: No focal deficit present. Mental Status: He is alert and oriented to person, place, and time. Cranial Nerves: No cranial nerve deficit. Sensory: No sensory deficit. Motor: No weakness. Coordination: Coordination normal.      Gait: Gait normal.      Deep Tendon Reflexes: Reflexes normal.   Psychiatric:         Mood and Affect: Mood normal.         Behavior: Behavior normal.         Thought Content: Thought content normal.         Judgment: Judgment normal.         Current Medications      cefTRIAXone (ROCEPHIN) IV  1,000 mg IntraVENous Q24H    azithromycin  500 mg Oral Daily    carvedilol  3.125 mg Oral Daily    pantoprazole  40 mg Oral QAM AC    sevelamer  800 mg Oral TID WC    sodium chloride flush  5-40 mL IntraVENous 2 times per day    dexamethasone  6 mg IntraVENous Q24H    albuterol sulfate HFA  2 puff Inhalation Q4H    heparin (porcine)  5,000 Units SubCUTAneous 3 times per day         Labs and Imaging Studies   Laboratory findings:  XR CHEST PORTABLE    Result Date: 12/25/2022  EXAMINATION: ONE X-RAY VIEW OF THE CHEST 12/25/2022 2:22 pm COMPARISON: 09/08/2022 HISTORY: ORDERING SYSTEM PROVIDED HISTORY: chest pain TECHNOLOGIST PROVIDED HISTORY: Reason for exam:->chest pain Reason for Exam: chest pain, COVID+ FINDINGS: The heart is normal size and configuration.   The previously described right upper lobe mass is not evident on the current exam.  Bilateral lung infiltrates are present worrisome for COVID-19 pneumonia given the patient's clinical history. No pleural effusion is seen. No osseous abnormality is noted. Bilateral lung infiltrates highly concerning for COVID-19 pneumonia given the patient's clinical history. Nonvisualization of the previously described right upper lobe mass. CTA PULMONARY W CONTRAST    Result Date: 12/25/2022  EXAMINATION: CTA OF THE CHEST 12/25/2022 6:18 pm TECHNIQUE: CTA of the chest was performed after the administration of intravenous contrast.  Multiplanar reformatted images are provided for review. MIP images are provided for review. Automated exposure control, iterative reconstruction, and/or weight based adjustment of the mA/kV was utilized to reduce the radiation dose to as low as reasonably achievable. COMPARISON: 09/30/2022 HISTORY: ORDERING SYSTEM PROVIDED HISTORY: Hypoxia, metastatic melanoma in lung, need to rule out PE TECHNOLOGIST PROVIDED HISTORY: Reason for exam:->Hypoxia, metastatic melanoma in lung, need to rule out PE Reason for Exam: Hypoxia, metastatic melanoma in lung, need to rule out PE Additional signs and symptoms: none Relevant Medical/Surgical History: 75ml isovue 370/ dialysis patient FINDINGS: Pulmonary Arteries: Pulmonary arteries are adequately opacified for evaluation. No evidence of intraluminal filling defect to suggest pulmonary embolism. Main pulmonary artery is normal in caliber. Mediastinum: Mild cardiomegaly. No pericardial effusion. Thoracic aorta normal in caliber with mild calcifications. No pathologically enlarged mediastinal or hilar lymph node. Chronic calcified right hilar lymph nodes are consistent with sequela of prior granulomatous disease. Lungs/pleura: New patchy and confluent predominantly subpleural ground-glass opacities that new patchy and confluent ground-glass opacities throughout both lungs in a subpleural and bronchovascular distribution. Mild bibasilar atelectasis. No pneumothorax or pleural effusion. Airways unremarkable. Interval decrease in size of a right upper lobe pulmonary nodule now measuring 1.4 x 1.1 cm compared to 2.3 x 1.9 cm previously. No new pulmonary nodule is identified. Upper Abdomen: Colonic diverticulosis. Small amount of pneumobilia. Soft Tissues/Bones: No acute osseous or soft tissue abnormality. Right axillary surgical clips. No enlarged axillary lymph node. 1. No evidence of pulmonary embolism. 2. Bilateral pulmonary ground-glass opacities. Commonly reported imaging features of COVID-19 pneumonia are present. Other processes such as influenza pneumonia and organizing pneumonia, as can be seen with drug toxicity and connective tissue disease, can cause a similar imaging pattern. PneTyp 3. Interval decrease in size of the patient's known right upper lobe pulmonary nodule. Continued follow-up is advised. 4. Pneumobilia of uncertain etiology. Correlation with history of prior sphincterotomy is recommended. 5. Colonic diverticulosis.        Recent Results (from the past 24 hour(s))   CBC with Auto Differential    Collection Time: 12/25/22  2:53 PM   Result Value Ref Range    WBC 14.7 (H) 4.0 - 10.5 K/CU MM    RBC 4.13 (L) 4.6 - 6.2 M/CU MM    Hemoglobin 12.1 (L) 13.5 - 18.0 GM/DL    Hematocrit 36.8 (L) 42 - 52 %    MCV 89.1 78 - 100 FL    MCH 29.3 27 - 31 PG    MCHC 32.9 32.0 - 36.0 %    RDW 12.8 11.7 - 14.9 %    Platelets 305 752 - 572 K/CU MM    MPV 10.0 7.5 - 11.1 FL    Differential Type AUTOMATED DIFFERENTIAL     Segs Relative 90.6 (H) 36 - 66 %    Lymphocytes % 3.3 (L) 24 - 44 %    Monocytes % 5.2 (H) 0 - 4 %    Eosinophils % 0.0 0 - 3 %    Basophils % 0.3 0 - 1 %    Segs Absolute 13.3 K/CU MM    Lymphocytes Absolute 0.5 K/CU MM    Monocytes Absolute 0.8 K/CU MM    Eosinophils Absolute 0.0 K/CU MM    Basophils Absolute 0.0 K/CU MM    Nucleated RBC % 0.0 %    Total Nucleated RBC 0.0 K/CU MM    TRC 0.0727 K/CU MM    Total Immature Neutrophil 0.09 K/CU MM    Immature Neutrophil % 0.6 (H) 0 - 0.43 % Comprehensive Metabolic Panel    Collection Time: 12/25/22  2:53 PM   Result Value Ref Range    Sodium 133 (L) 135 - 145 MMOL/L    Potassium 3.9 3.5 - 5.1 MMOL/L    Chloride 91 (L) 99 - 110 mMol/L    CO2 26 21 - 32 MMOL/L    BUN 35 (H) 6 - 23 MG/DL    Creatinine 4.7 (H) 0.9 - 1.3 MG/DL    Est, Glom Filt Rate 12 (L) >60 mL/min/1.73m2    Glucose 99 70 - 99 MG/DL    Calcium 9.2 8.3 - 10.6 MG/DL    Albumin 3.4 3.4 - 5.0 GM/DL    Total Protein 6.7 6.4 - 8.2 GM/DL    Total Bilirubin 1.0 0.0 - 1.0 MG/DL    ALT 16 10 - 40 U/L    AST 28 15 - 37 IU/L    Alkaline Phosphatase 86 40 - 129 IU/L    Anion Gap 16 4 - 16   Troponin    Collection Time: 12/25/22  2:53 PM   Result Value Ref Range    Troponin T 0.037 (H) <0.01 NG/ML   Brain Natriuretic Peptide    Collection Time: 12/25/22  2:53 PM   Result Value Ref Range    Pro-BNP 26,973 (H) <300 PG/ML   Respiratory Panel, Molecular, with COVID-19 (Restricted: peds pts or suitable admitted adults)    Collection Time: 12/25/22  2:53 PM    Specimen: Nasopharyngeal   Result Value Ref Range    Adenovirus Detection by PCR NOT DETECTED NOT DETECTED    Coronavirus 229E PCR NOT DETECTED NOT DETECTED    Coronavirus HKU1 PCR NOT DETECTED NOT DETECTED    Coronavirus NL63 PCR NOT DETECTED NOT DETECTED    Coronavirus OC43 PCR NOT DETECTED NOT DETECTED    SARS-CoV-2 DETECTED BY PCR (A) NOT DETECTED    Human Metapneumovirus PCR NOT DETECTED NOT DETECTED    Rhinovirus Enterovirus PCR NOT DETECTED NOT DETECTED    Influenza A by PCR NOT DETECTED NOT DETECTED    Influenza A H1 Pandemic PCR NOT DETECTED NOT DETECTED    Influenza A H1 (2009) PCR NOT DETECTED NOT DETECTED    Influenza A H3 PCR NOT DETECTED NOT DETECTED    Influenza B by PCR NOT DETECTED NOT DETECTED    Parainfluenza 1 PCR NOT DETECTED NOT DETECTED    Parainfluenza 2 PCR NOT DETECTED NOT DETECTED    Parainfluenza 3 PCR NOT DETECTED NOT DETECTED    Parainfluenza 4 PCR NOT DETECTED NOT DETECTED    RSV PCR NOT DETECTED NOT DETECTED Bordetella parapertussis by PCR NOT DETECTED NOT DETECTED    B Pertussis by PCR NOT DETECTED NOT DETECTED    Chlamydophila Pneumonia PCR NOT DETECTED NOT DETECTED    Mycoplasma pneumo by PCR NOT DETECTED NOT DETECTED   Lactic Acid    Collection Time: 12/25/22  2:53 PM   Result Value Ref Range    Lactate 2.2 (HH) 0.5 - 1.9 mMOL/L   EKG 12 Lead    Collection Time: 12/25/22  2:54 PM   Result Value Ref Range    Ventricular Rate 84 BPM    Atrial Rate 84 BPM    P-R Interval 174 ms    QRS Duration 110 ms    Q-T Interval 390 ms    QTc Calculation (Bazett) 460 ms    P Axis 48 degrees    R Axis -48 degrees    T Axis 24 degrees    Diagnosis        Poor data quality, interpretation may be adversely affected  Normal sinus rhythm  Left axis deviation  Incomplete right bundle branch block  Moderate voltage criteria for LVH, may be normal variant  Junctional ST depression, probably abnormal  Abnormal ECG  When compared with ECG of 03-APR-2017 12:23,  Incomplete right bundle branch block has replaced Right bundle branch block  Confirmed by Joellen Abreu MD, [facility] ([id number]) on 12/26/2022 7:36:21 AM     Culture, Blood 1    Collection Time: 12/25/22  4:15 PM    Specimen: Blood   Result Value Ref Range    Specimen BLOOD     Special Requests       CALLED TO Justin Peña RN ON 12/26 AT 1025 BY LSCOTT MLT  GRAM VARIABLE BACILLI      Culture GRAM VARIABLE BACILLI IDENTIFICATION IN PROGRESS    High sensitivity CRP    Collection Time: 12/25/22  9:57 PM   Result Value Ref Range    CRP, High Sensitivity 214.7 (H) 0.0 - 5.0 mg/L   Procalcitonin    Collection Time: 12/25/22  9:57 PM   Result Value Ref Range    Procalcitonin 64.52    Basic Metabolic Panel w/ Reflex to MG    Collection Time: 12/26/22  4:46 AM   Result Value Ref Range    Sodium 129 (L) 135 - 145 MMOL/L    Potassium 4.0 3.5 - 5.1 MMOL/L    Chloride 91 (L) 99 - 110 mMol/L    CO2 25 21 - 32 MMOL/L    Anion Gap 13 4 - 16    BUN 48 (H) 6 - 23 MG/DL    Creatinine 5.0 (H) 0.9 - 1.3 MG/DL Est, Glom Filt Rate 11 (L) >60 mL/min/1.73m2    Glucose 158 (H) 70 - 99 MG/DL    Calcium 8.8 8.3 - 10.6 MG/DL   High sensitivity CRP    Collection Time: 12/26/22  4:46 AM   Result Value Ref Range    CRP, High Sensitivity 213.6 (H) 0.0 - 5.0 mg/L           Electronically signed by Sekou Mcginnis MD on 12/26/2022 at 2:42 PM      Comment: Please note this report has been produced using speech recognition software and may contain errors related to that system including errors in grammar, punctuation, and spelling, as well as words and phrases that may be inappropriate. If there are any questions or concerns please feel free to contact the dictating provider for clarification.

## 2022-12-26 NOTE — CONSULTS
Patient is not a candidate for Baricitinib due to eGFR<15. Baricitinib is not recommended for use in this patient population.     Thank you,  Ricky Brumfield, [facility]

## 2022-12-26 NOTE — CONSULTS
Nephrology Service Consultation      [address], [address] [address]  Phone: [phone]  Office Hours: 8:30AM - 4:30PM  Monday - Friday        MEDICAL DECISION MAKING and Recommendations     Acute hypoxic resp failure from COVID 19  Hyponatremia  RUL nodule: decreasing in size  ESRD on HD MWF  CKD-MBD    Suggest:  HD today 2L UF if BP tolerates  Continue renvela  Anuric so lasix will not help,  dc lasix please    Thank you        Patient Active Problem List    Diagnosis Date Noted    Acute respiratory failure ([address] 12/25/2022    Lung mass 10/12/2022    Malignant melanoma of right upper extremity including shoulder ([address] 06/22/2021    Hemodialysis access site with mature fistula ([address] 07/07/2019    Urinary tract infection due to extended-spectrum beta lactamase (ESBL) producing Escherichia coli 07/03/2019    Chronic kidney disease (CKD), stage IV (severe) ([address] 11/16/2016    Right inguinal hernia 11/16/2016         Patient:  Harshil Miles  MRN: [id number]  Consulting physician:  Bassam Brown MD  Reason for Consult: office pt  PCP: Leonila Landry MD    HISTORY OF PRESENT ILLNESS:   The patient is a 78 y.o. male with ESRD on HD MWF, hearing impairment, p[resents with shortness of breath. He already had a diagnosis of COVID as outpt  Renal consult as he is an office pt. He is on nasal cannula this morning  Hemodynamically stable    REVIEW OF SYSTEMS:  14 point ROS is Negative.  See positive ROS per HPI    Past Medical History:        Diagnosis Date    Abnormal urine     abn urine culture 4/3/2017- OR for 4/13/2017 cancelled-rescheduled for 4/25/2017- ( on 4/24/2017 pt states finishes antibiotic today and had repeat urine culture at office last week)    Chronic kidney disease, stage 4, severely decreased GFR ([address] 09/2016    Dialysis on Mon-Wed-Fri    follows with Dr Lila Thorne    Nunez catheter in place     Has had in place since 9/29/16    Hearing deficit     both ears --no hearing aids Hemodialysis patient [facility])     Sees Dr Kole Hernandez    Dialysis on Mon-Wed-Fri    History of blood transfusion     Hypertension     Follows with Dr. Kole Hernandez    Inguinal hernia     Missing teeth, acquired     upper and lower  some broken off now    Wears glasses     for reading       Past Surgical History:        Procedure Laterality Date    ABDOMEN SURGERY Right 6/22/2021    RIGHT BACK EXCISION 5.8 CM MELANOMA (3.1 MM DEPTH) WITH LOCAL FLAP performed by Cadence Khan MD at [address] 6/22/2021    SENTINEL LYMPHNODE [facility] performed by Gracie Holloway MD at [address] [place] [address] Left 11/29/2016    Left upper arm    HERNIA REPAIR Right 11/29/2016    INGUINAL HERNIA    PROSTATE SURGERY  04/2017       Medications:   Prior to Admission medications    Medication Sig Start Date End Date Taking? Authorizing Provider   Amoxicillin 500 MG TABS  12/5/22   Historical Provider, MD   ondansetron (ZOFRAN) 8 MG tablet Take 1 tablet by mouth every 8 hours as needed for Nausea or Vomiting 11/30/22   Jose Loco MD   pantoprazole (PROTONIX) 40 MG tablet  5/11/21   Historical Provider, MD   carvedilol (COREG) 3.125 MG tablet Take 1 tablet by mouth daily 5/11/21   Khushboo Schwartz MD   B Complex-C-Folic Acid (SHEA-ABHISHEK) TABS Take 1 tablet by mouth daily    Historical Provider, MD   RENVELA 800 MG tablet 2 Tabs  PO TID WITH MEALS 1/7/17   Historical Provider, MD   nitroGLYCERIN (NITRODUR) 0.2 MG/HR Place 1 patch onto the skin every other day  11/2/16   Historical Provider, MD   isosorbide mononitrate (IMDUR) 30 MG extended release tablet 30 mg daily  11/2/16   Historical Provider, MD        Allergies:  Patient has no known allergies. Social History:   TOBACCO:   reports that he quit smoking about 30 years ago. His smoking use included cigarettes. He started smoking about 61 years ago. He has a 60.00 pack-year smoking history.  He has never used smokeless tobacco.  ETOH:   reports no history of alcohol use.   OCCUPATION:      Family History:       Problem Relation Age of Onset    Diabetes Mother     Diabetes Brother     Heart Disease Brother      Physical Exam:    Vitals: BP (!) 133/57   Pulse 51   Temp 97.9 °F (36.6 °C) (Tympanic)   Resp 15   Ht 5' 8\" (1.727 m)   Wt 183 lb 13.8 oz (83.4 kg)   SpO2 98%   BMI 27.96 kg/m²   General appearance: in no acute distress, appears stated age  Skin: Skin color, texture, turgor normal. No rashes or lesions  HEENT: normocephalic, atraumatic  Neck: supple, trachea midline  Lungs:  breathing comfortably o n NC  Heart[de-identified] regular rate and rhythm, S1, S2 normal,  Abdomen: soft, non-tender; bowel sounds normal; no masses,   Extremities: extremities normal, atraumatic, no cyanosis or edema  Neurologic: Mental status: alert, oriented, interactive, following commands  Psychiatric: mood and affect appropriate     CBC:   Recent Labs     12/25/22  1453   WBC 14.7*   HGB 12.1*        BMP:    Recent Labs     12/25/22  1453   *   K 3.9   CL 91*   CO2 26   BUN 35*   CREATININE 4.7*   GLUCOSE 99     Hepatic:   Recent Labs     12/25/22  1453   AST 28   ALT 16   BILITOT 1.0   ALKPHOS 86              Electronically signed by Criselda Pereira DO on 12/26/2022 at [address] [place], [place] [address]MD Socorro DO [place] [address] [place] [id number]  PHONE: [phone]  FAX: [phone]

## 2022-12-26 NOTE — PROGRESS NOTES
LOVENOX PROPHYLAXIS EVALUATION    Wt Readings from Last 3 Encounters:   12/25/22 138 lb (62.6 kg)   12/09/22 138 lb 6.4 oz (62.8 kg)   12/06/22 141 lb 9.6 oz (64.2 kg)       Estimated Creatinine Clearance: 12 mL/min (A) (based on SCr of 4.7 mg/dL (H)).   Recent Labs     12/25/22  1453   BUN 35*   CREATININE 4.7*      HGB 12.1*   HCT 36.8*       Weight Range: .9 kg    CRCL <15 or dialysis    50.9 kg   and below     .9  kg   101-150.9 kg   151-174.9  kg   175 kg  or greater     Heparin 5,000 units  subq BID     Heparin 5,000 units  subq TID       Heparin 5,000 units  subq TID   Heparin 5,000 units  subq TID   Heparin 7,500 units  subq TID       Per P/T protocol for appropriate subq anticoagulation by weight and CRCL change to:    Heparin 5,000 units subq TID      Shaq Arana RPH  8:50 PM  12/25/22

## 2022-12-26 NOTE — PROGRESS NOTES
Pt back from dialysis. Report via phone, 1.5 L removed. Pt in room, set up for dinner and call light within reach.

## 2022-12-26 NOTE — DIALYSIS
Tolerated 3 hour dialysis treatment well, removed 1.5 liters with treatment. Left AVF cannulated with 16 gauge dialysis needles without complication. Patient Name: Kylah Tena  Patient : 1945  MRN: [id number]     Acct: [de-identified]  Date of Admission: 2022  Room/Bed: -A  Code Status:  Full Code  Allergies: No Known Allergies  Diagnosis:    Patient Active Problem List   Diagnosis    Chronic kidney disease (CKD), stage IV (severe) ([address]    Right inguinal hernia    Urinary tract infection due to extended-spectrum beta lactamase (ESBL) producing Escherichia coli    Hemodialysis access site with mature fistula ([address]    Malignant melanoma of right upper extremity including shoulder ([address]    Lung mass    Acute respiratory failure ([address]         Treatment:  Hemodilaysis 2:1  Priority: Routine  Location: Acute Room    Diabetic: No  NPO: No  Isolation Precautions: Airborne     Consent for Treatment Verified: Yes  Blood Consent Verified: Not Applicable     Safety Verified: Identify (I), Consent (C), Equipment (E), HepB Status (B), Orders Complete (O), Access Verified (A), and Timeliness (T)  Time out performed prior to access at 1432 hours. Report Received from Primary RN at 1400 hours. Primary RN (First Initial, Last Name, Title): JULY Steiner RN  Incapacitated Nurse Education Completed: Not Applicable     HBsAg ONLY:  Date Drawn: 2022       Results: Negative  HBsAb:  Date Drawn:  August 3, 2022       Results: Immune >10    Order  Dialysis Bath  K+ (Potassium): 3  Ca+ (Calcium): 2.5  Na+ (Sodium): 138  HCO3 (Bicarb): 30     Na+ Modeling: Not Applicable  Dialyzer: [id number]  Dialysate Temperature (C):  35  Blood Flow Rate (BFR):  300   Dialysate Flow Rate (DFR):   600        Access to be Utilized   Access: AVF  Location: Upper Extremity  Side: Left   Needle gauge:  16  + Bruit/Thrill: yes    First Use X-ray Verified: Not Applicable  OK to use line order: Not Applicable    Site Assessment:  Signs and Symptoms of Infection/Inflammation: None  If yes: Not Applicable  Gauze Dressing?: No  Non Dialysis Use?: No  Comment:    Flows: Good, Patent  If access problem, who was notified:     Pre and Post-Assessment  Patient Vitals for the past 8 hrs:   Level of Consciousness Oriented X Heart Rhythm Respiratory Quality/Effort O2 Device Bilateral Breath Sounds Skin Color Skin Condition/Temp Appetite Abdomen Inspection Bowel Sounds (All Quadrants) Edema Comments Pain Level   12/26/22 1437 0 4 Regular Dyspnea with exertion;Unlabored Nasal cannula Diminished Pale Dry; Warm Poor -- Active -- timeout completed, VSS, rec'd report from RN, hep status verified, water alarm intact 0   12/26/22 1750 0 -- Regular Dyspnea with exertion;Unlabored Nasal cannula Diminished -- Dry;Warm -- Soft Active None treatment completed 0     Labs  Recent Labs     12/25/22  1453   WBC 14.7*   HGB 12.1*   HCT 36.8*                                                                     Recent Labs     12/25/22  1453 12/26/22  0446   * 129*   K 3.9 4.0   CL 91* 91*   CO2 26 25   BUN 35* 48*   CREATININE 4.7* 5.0*   GLUCOSE 99 158*     IV Drips and Rate/Dose   sodium chloride        Safety - Before each treatment:   Dialysis Machine No.: [id number]   Machine Number: [id number]  Dialyzer Lot No.: [id number]   Machine Log Sheet Completed: Yes  Machine Alarm Self Test: Completed; Passed (12/26/22 1437)     Air Foam Detector: Tested, Proper Function, pH Reading  Extracorporeal Circuit Tested for Integrity: Yes  Machine Conductivity: 13.8  Manual Conductivity: 13.8           Manual Ph: 7.2  Bleach Test (Neg): Yes  Bath Temperature: 95 °F (35 °C)  Tubing Lot#: [id number]  Conductivity Meter Serial #: [id number]  All Connections Secure?: Yes  Venous Parameters Set?: Yes  Arterial Parameters Set?: Yes  Saline Line Double Clamped?: Yes  Air Foam Detector Engaged?: Yes  Machine Functioning Alarm Free?  Yes  Prime Given: 200ml    Chlorine Testing - Before each treatment and every 4 hours:   Treatment  Time On: 1437  Time Off: 1750  Treatment Goal: 2kg  Weight: 187 lb 2.7 oz (84.9 kg) (12/26/22 1437)  1st check: less than 0.1 ppm at: 1410 hours  2nd check: less than 0.1 ppm at: 1410 hours  3rd check: Not Applicable  (if greater than 0.1 ppm, then check every 30 minutes from secondary)    Access Flows and Pressures  Patient Vitals for the past 8 hrs:   Blood Flow Rate (ml/min) Ultrafiltration Rate (ml/hr) Arterial Pressure (mmHg) Venous Pressure (mmHg) TMP DFR Comments Access Visible   12/26/22 1437 300 ml/min 830 ml/hr -70 mmHg 150 50 600 TREATMENT STARTED Yes   12/26/22 1445 300 ml/min 830 ml/hr -70 mmHg 150 50 600 denies complaints Yes   12/26/22 1500 300 ml/min 830 ml/hr -70 mmHg 150 50 600 no distress Yes   12/26/22 1515 300 ml/min 830 ml/hr -80 mmHg 100 50 600 lines secure Yes   12/26/22 1530 300 ml/min 830 ml/hr -80 mmHg 100 50 600 talking on phone Yes   12/26/22 1545 300 ml/min 830 ml/hr -150 mmHg 100 50 600 denies complaints and lab drawn Yes   12/26/22 1600 300 ml/min 830 ml/hr -140 mmHg 150 50 600 lines secure Yes   12/26/22 1615 300 ml/min 460 ml/hr -100 mmHg 150 40 600 no distress Yes   12/26/22 1630 300 ml/min 460 ml/hr -120 mmHg 160 40 600 lines secure Yes   12/26/22 1645 300 ml/min 460 ml/hr -100 mmHg 160 60 600 no distress Yes   12/26/22 1700 300 ml/min 460 ml/hr -90 mmHg 170 40 600 lines secure Yes   12/26/22 1715 300 ml/min 460 ml/hr -100 mmHg 160 60 600 no distress Yes   12/26/22 1730 300 ml/min 460 ml/hr -90 mmHg 120 60 600 lines secure Yes   12/26/22 1745 300 ml/min 460 ml/hr -90 mmHg 120 60 600 no distress Yes   12/26/22 1750 200 ml/min -- -- -- -- -- treatment completeted Yes     Vital Signs  Patient Vitals for the past 8 hrs:   BP Temp Pulse Resp SpO2 Weight Weight Method Percent Weight Change   12/26/22 1109 -- -- 61 18 96 % -- -- --   12/26/22 1202 134/65 97.3 °F (36.3 °C) 62 17 93 % -- -- --   12/26/22 1437 126/66 97.9 °F (36.6 °C) 59 18 92 % 187 lb 2.7 oz (84.9 kg) Bed scale 1.83   12/26/22 1445 135/74 -- 62 18 93 % -- -- --   12/26/22 1500 (!) 122/58 -- 61 20 92 % -- -- --   12/26/22 1515 (!) 110/59 -- 61 19 94 % -- -- --   12/26/22 1530 (!) 119/53 -- 66 16 93 % -- -- --   12/26/22 1545 (!) 112/58 -- 60 18 95 % -- -- --   12/26/22 1600 (!) 113/53 -- 59 20 94 % -- -- --   12/26/22 1615 (!) 104/51 -- 61 17 94 % -- -- --   12/26/22 1630 (!) 114/57 -- 59 20 96 % -- -- --   12/26/22 1645 (!) 109/56 -- 62 18 96 % -- -- --   12/26/22 1700 (!) 102/53 -- 60 17 93 % -- -- --   12/26/22 1715 (!) 104/52 -- 59 21 96 % -- -- --   12/26/22 1730 (!) 106/52 -- 59 17 94 % -- -- --   12/26/22 1745 (!) 106/55 -- 61 19 95 % -- -- --   12/26/22 1750 (!) 124/53 98 °F (36.7 °C) 62 16 96 % -- -- --     Post-Dialysis  Arterial Catheter Locking Solution: Not Applicable  Venous Catheter Locking Solution: Not Applicable  Post-Treatment Procedures: Blood returned, Access bleeding time < 10 minutes  Machine Disinfection Process: Acid/Vinegar Clean, Heat Disinfect, Exterior Machine Disinfection  Rinseback Volume (ml): 300 ml  Blood Volume Processed (Liters): 52.3 l/min  Dialyzer Clearance: Moderately streaked  Duration of Treatment (minutes): 180 minutes     Hemodialysis Intake (ml): 500 ml  Hemodialysis Output (ml): 2000 ml     Tolerated Treatment: Good             Provider Notification        Handoff complete and report given to Primary RN at 1800 hours. Primary RN (First Initial, Last Name, Title): JULY Adam RN     Education  Person Educated: Patient   Knowledge Base: Substantial  Barriers to Learning?: None  Preferred method of Learning: Oral  Topic(s): Access Care, Signs and Symptoms of Infection, Fluid Management, Procedural, and Potassium   Teaching Tools: Demonstration and Explanation   Response to Education: Verbalized Understanding     Electronically signed by Haven Herrera RN on 12/26/2022 at 6:25 PM

## 2022-12-26 NOTE — PROGRESS NOTES
Pt refuses to take his jeans off. States that he has no skin problems. Continent of B&B. Ambulatory.

## 2022-12-27 PROBLEM — E44.0 MODERATE MALNUTRITION (HCC): Status: ACTIVE | Noted: 2022-12-27

## 2022-12-27 LAB
ANION GAP SERPL CALCULATED.3IONS-SCNC: 13 MMOL/L (ref 4–16)
BASOPHILS ABSOLUTE: 0 K/CU MM
BASOPHILS RELATIVE PERCENT: 0.1 % (ref 0–1)
BUN BLDV-MCNC: 31 MG/DL (ref 6–23)
CALCIUM SERPL-MCNC: 9.1 MG/DL (ref 8.3–10.6)
CHLORIDE BLD-SCNC: 98 MMOL/L (ref 99–110)
CO2: 25 MMOL/L (ref 21–32)
CREAT SERPL-MCNC: 3.6 MG/DL (ref 0.9–1.3)
DIFFERENTIAL TYPE: ABNORMAL
EOSINOPHILS ABSOLUTE: 0 K/CU MM
EOSINOPHILS RELATIVE PERCENT: 0 % (ref 0–3)
GFR SERPL CREATININE-BSD FRML MDRD: 17 ML/MIN/1.73M2
GLUCOSE BLD-MCNC: 160 MG/DL (ref 70–99)
HCT VFR BLD CALC: 33.7 % (ref 42–52)
HEMOGLOBIN: 10.9 GM/DL (ref 13.5–18)
HIGH SENSITIVE C-REACTIVE PROTEIN: 143.8 MG/L (ref 0–5)
IMMATURE NEUTROPHIL %: 0.6 % (ref 0–0.43)
LYMPHOCYTES ABSOLUTE: 0.6 K/CU MM
LYMPHOCYTES RELATIVE PERCENT: 3.3 % (ref 24–44)
MAGNESIUM: 2.1 MG/DL (ref 1.8–2.4)
MCH RBC QN AUTO: 28.7 PG (ref 27–31)
MCHC RBC AUTO-ENTMCNC: 32.3 % (ref 32–36)
MCV RBC AUTO: 88.7 FL (ref 78–100)
MONOCYTES ABSOLUTE: 0.9 K/CU MM
MONOCYTES RELATIVE PERCENT: 4.9 % (ref 0–4)
NUCLEATED RBC %: 0 %
PDW BLD-RTO: 12.8 % (ref 11.7–14.9)
PHOSPHORUS: 3.3 MG/DL (ref 2.5–4.9)
PLATELET # BLD: 252 K/CU MM (ref 140–440)
PMV BLD AUTO: 10.3 FL (ref 7.5–11.1)
POTASSIUM SERPL-SCNC: 3.8 MMOL/L (ref 3.5–5.1)
PROCALCITONIN: 26.02
RBC # BLD: 3.8 M/CU MM (ref 4.6–6.2)
SEGMENTED NEUTROPHILS ABSOLUTE COUNT: 16.2 K/CU MM
SEGMENTED NEUTROPHILS RELATIVE PERCENT: 91.1 % (ref 36–66)
SODIUM BLD-SCNC: 136 MMOL/L (ref 135–145)
TOTAL IMMATURE NEUTOROPHIL: 0.11 K/CU MM
TOTAL NUCLEATED RBC: 0 K/CU MM
WBC # BLD: 17.8 K/CU MM (ref 4–10.5)

## 2022-12-27 PROCEDURE — 84100 ASSAY OF PHOSPHORUS: CPT

## 2022-12-27 PROCEDURE — 2580000003 HC RX 258: Performed by: NURSE PRACTITIONER

## 2022-12-27 PROCEDURE — 94761 N-INVAS EAR/PLS OXIMETRY MLT: CPT

## 2022-12-27 PROCEDURE — 6360000002 HC RX W HCPCS: Performed by: NURSE PRACTITIONER

## 2022-12-27 PROCEDURE — 80048 BASIC METABOLIC PNL TOTAL CA: CPT

## 2022-12-27 PROCEDURE — 85025 COMPLETE CBC W/AUTO DIFF WBC: CPT

## 2022-12-27 PROCEDURE — 6360000002 HC RX W HCPCS: Performed by: INTERNAL MEDICINE

## 2022-12-27 PROCEDURE — 94640 AIRWAY INHALATION TREATMENT: CPT

## 2022-12-27 PROCEDURE — 83735 ASSAY OF MAGNESIUM: CPT

## 2022-12-27 PROCEDURE — 86141 C-REACTIVE PROTEIN HS: CPT

## 2022-12-27 PROCEDURE — 6370000000 HC RX 637 (ALT 250 FOR IP): Performed by: NURSE PRACTITIONER

## 2022-12-27 PROCEDURE — 1200000000 HC SEMI PRIVATE

## 2022-12-27 PROCEDURE — 2500000003 HC RX 250 WO HCPCS: Performed by: INTERNAL MEDICINE

## 2022-12-27 PROCEDURE — 36415 COLL VENOUS BLD VENIPUNCTURE: CPT

## 2022-12-27 PROCEDURE — 6370000000 HC RX 637 (ALT 250 FOR IP): Performed by: INTERNAL MEDICINE

## 2022-12-27 PROCEDURE — 2580000003 HC RX 258: Performed by: INTERNAL MEDICINE

## 2022-12-27 PROCEDURE — 87040 BLOOD CULTURE FOR BACTERIA: CPT

## 2022-12-27 PROCEDURE — 2700000000 HC OXYGEN THERAPY PER DAY

## 2022-12-27 PROCEDURE — 84145 PROCALCITONIN (PCT): CPT

## 2022-12-27 RX ORDER — CLINDAMYCIN PHOSPHATE 900 MG/50ML
900 INJECTION INTRAVENOUS EVERY 8 HOURS
Status: DISCONTINUED | OUTPATIENT
Start: 2022-12-27 | End: 2022-12-28 | Stop reason: HOSPADM

## 2022-12-27 RX ADMIN — DEXAMETHASONE SODIUM PHOSPHATE 6 MG: 10 INJECTION INTRAMUSCULAR; INTRAVENOUS at 07:54

## 2022-12-27 RX ADMIN — SEVELAMER CARBONATE 800 MG: 800 TABLET, FILM COATED ORAL at 16:25

## 2022-12-27 RX ADMIN — HEPARIN SODIUM 5000 UNITS: 5000 INJECTION INTRAVENOUS; SUBCUTANEOUS at 13:46

## 2022-12-27 RX ADMIN — HEPARIN SODIUM 5000 UNITS: 5000 INJECTION INTRAVENOUS; SUBCUTANEOUS at 05:46

## 2022-12-27 RX ADMIN — CARVEDILOL 3.12 MG: 6.25 TABLET, FILM COATED ORAL at 07:54

## 2022-12-27 RX ADMIN — ALBUTEROL SULFATE 2 PUFF: 90 AEROSOL, METERED RESPIRATORY (INHALATION) at 08:05

## 2022-12-27 RX ADMIN — ALBUTEROL SULFATE 2 PUFF: 90 AEROSOL, METERED RESPIRATORY (INHALATION) at 21:24

## 2022-12-27 RX ADMIN — CLINDAMYCIN IN 5 PERCENT DEXTROSE 900 MG: 18 INJECTION, SOLUTION INTRAVENOUS at 20:21

## 2022-12-27 RX ADMIN — ALBUTEROL SULFATE 2 PUFF: 90 AEROSOL, METERED RESPIRATORY (INHALATION) at 01:52

## 2022-12-27 RX ADMIN — SEVELAMER CARBONATE 800 MG: 800 TABLET, FILM COATED ORAL at 07:54

## 2022-12-27 RX ADMIN — HEPARIN SODIUM 5000 UNITS: 5000 INJECTION INTRAVENOUS; SUBCUTANEOUS at 20:32

## 2022-12-27 RX ADMIN — SODIUM CHLORIDE: 9 INJECTION, SOLUTION INTRAVENOUS at 13:49

## 2022-12-27 RX ADMIN — AZITHROMYCIN MONOHYDRATE 500 MG: 250 TABLET ORAL at 07:54

## 2022-12-27 RX ADMIN — PANTOPRAZOLE SODIUM 40 MG: 40 TABLET, DELAYED RELEASE ORAL at 05:46

## 2022-12-27 RX ADMIN — SODIUM CHLORIDE, PRESERVATIVE FREE 10 ML: 5 INJECTION INTRAVENOUS at 21:00

## 2022-12-27 RX ADMIN — SODIUM CHLORIDE, PRESERVATIVE FREE 10 ML: 5 INJECTION INTRAVENOUS at 07:54

## 2022-12-27 RX ADMIN — ALBUTEROL SULFATE 2 PUFF: 90 AEROSOL, METERED RESPIRATORY (INHALATION) at 16:03

## 2022-12-27 RX ADMIN — SEVELAMER CARBONATE 800 MG: 800 TABLET, FILM COATED ORAL at 13:45

## 2022-12-27 RX ADMIN — CEFTRIAXONE SODIUM 1000 MG: 1 INJECTION, POWDER, FOR SOLUTION INTRAMUSCULAR; INTRAVENOUS at 13:51

## 2022-12-27 ASSESSMENT — PAIN SCALES - GENERAL
PAINLEVEL_OUTOF10: 0
PAINLEVEL_OUTOF10: 0

## 2022-12-27 NOTE — PLAN OF CARE
Problem: Discharge Planning  Goal: Discharge to home or other facility with appropriate resources  12/27/2022 0026 by Edgard Fontana RN  Outcome: Progressing  12/26/2022 1036 by Lucien Downey RN  Outcome: Progressing     Problem: ABCDS Injury Assessment  Goal: Absence of physical injury  12/27/2022 0026 by Edgard Fontana RN  Outcome: Progressing  12/26/2022 1036 by Lucien Downey RN  Outcome: Progressing     Problem: Safety - Adult  Goal: Free from fall injury  12/27/2022 0026 by Edgard Fontana RN  Outcome: Progressing  12/26/2022 1036 by Lucien Downey RN  Outcome: Progressing

## 2022-12-27 NOTE — PROGRESS NOTES
Comprehensive Nutrition Assessment    Type and Reason for Visit:  Initial (low BMI)    Nutrition Recommendations/Plan:   Add renal oral nutrition supplement  Encourage po intake as able  Please document all po intake  Will monitor po intake ,weight trends, poc     Malnutrition Assessment:  Malnutrition Status: Moderate malnutrition (12/27/22 1429)    Context:  Acute Illness       Nutrition Assessment:    Pt admitted for acute respiratory failure, ESRD, acute hypoxemic respiratory failure due to COVID-19, pneumonia, PMH: HTN, ESRD on HD, pt currently on regular diet with 1500 ml fluid restriction, visited pt at bedside, pt reports poor appetite PTA, reports appetite somewhat improved today, denies any chewing or swallowing difficulty, reports UBW ~143#, reports wt loss x past 1 week d/t not eating much, NFPE completed, pt meets criteria for malnutrition, will add renal oral nutrition supplements, will follow at high nutrition risk    Nutrition Related Findings:    BUN 31, Cr 3.6, Glucose 160, WBC 17.8 Wound Type: None       Current Nutrition Intake & Therapies:    Average Meal Intake: Unable to assess  Average Supplements Intake: None Ordered  ADULT DIET; Regular; 1500 ml    Anthropometric Measures:  Height: 5' 7.99\" (172.7 cm)  Ideal Body Weight (IBW): 154 lbs (70 kg)       Current Body Weight: 132 lb 7.9 oz (60.1 kg), 86 % IBW.  Weight Source: Bed Scale  Current BMI (kg/m2): 20.2  Usual Body Weight: 143 lb 4.8 oz (65 kg) ((10/11/22))  % Weight Change (Calculated): -7.5  Weight Adjustment For: No Adjustment                 BMI Categories: Underweight (BMI less than 22) age over 72    Estimated Daily Nutrient Needs:  Energy Requirements Based On: Kcal/kg  Weight Used for Energy Requirements: Ideal  Energy (kcal/day): 1267-7822 (30-35 kcal/kg)  Weight Used for Protein Requirements: Ideal  Protein (g/day): 84-98 (1.2-1.4 g/kg)     Fluid (ml/day): 1500 (ordered fluid restriction)    Nutrition Diagnosis:   Moderate malnutrition, In context of acute illness or injury related to inadequate protein-energy intake as evidenced by mild loss of subcutaneous fat, mild muscle loss, weight loss 7.5% in 3 months (pt consuming less than 75% for 1 week per pt)    Nutrition Interventions:   Food and/or Nutrient Delivery: Continue Current Diet, Start Oral Nutrition Supplement  Nutrition Education/Counseling: No recommendation at this time  Coordination of Nutrition Care: Continue to monitor while inpatient       Goals:     Goals: Meet at least 75% of estimated needs, by next RD assessment       Nutrition Monitoring and Evaluation:   Behavioral-Environmental Outcomes: None Identified  Food/Nutrient Intake Outcomes: Food and Nutrient Intake, Supplement Intake  Physical Signs/Symptoms Outcomes: Biochemical Data, GI Status, Fluid Status or Edema, Hemodynamic Status, Meal Time Behavior, Weight, Skin    Discharge Planning:     Too soon to determine     Dari [address] [place] [address],   Contact: [phone]

## 2022-12-27 NOTE — PROGRESS NOTES
In-Patient Progress Note    Patient:  Tess Gutierrez 78 y.o. male MRN: [id number]     Date of Service: 12/27/2022    Hospital Day: 3      Chief complaint: had concerns including Positive For Covid-19. Assessment and Plan   Tess Gutierrez, a 78 y.o. male, with a history of end-stage renal disease on hemodialysis, melanoma, metastatic melanoma to lung, was admitted on 12/25/2022 with complaints of had concerns including Positive For Covid-19.     Assessment and plan    December 27-Clostridium perfringens identified in blood today.  -Started IV penicillin and IV clindamycin, dose adjusted to ESRD  -Ordered CT chest, abdomen and pelvis-with contrast after discussing with nephrology  -Infectious disease consult not available at this time  -Patient denies pain anywhere at this time        Acute hypoxic respiratory failure  COVID 19 viral infection   Secondary bacterial pneumonia is suspected  -Hypoxia with O2 sat 58% in room air  -Currently on 6 L NC  -dexamethasone and bronchodilator  -Procal 28.36 - trend  -Cover with ceftriaxnone and azithro to cover CAP for now  -gram variable bacilli in blood culture - awaiting identification  -Baricitinib not indicated in view for ESRD CrCl < 15  -CTA pulmonary to rule out PE due to risk factor of metastatic melanoma and lung and COVID infection    Clostridium perfringens bacteremia-IV penicillin and IV clindamycin started 12/27     End-stage renal disease  -BNP level 26,000  -Patient ?anuric per nephro - no diuresis  -Nephrology onboard     Elevated troponin  -In setting of end-stage renal disease  -Denied chest pain  -Serial troponin     Lactic acidosis  -Lactic acid elevated 2.2  -White cell 14  -meet the criteria of sepsis, likely due to viral infection and hypoxia  -repeat LA     History of melanoma  Metastatic melanoma in lung  -Not on treatment  -Outpatient follow-up with oncology     Hypertension  -Continue Coreg      # Peptic ulcer prophylaxis: pantoprazole  # DVT Prophylaxis: heparin  #CODE STATUS: full code      Current living situation: home  Expected Disposition: home  Estimated discharge date: 3-4 days      Review of System     No vomiting or bleeding noted today    -he denied any pain anywhere today    Physical Exam   VITAL SIGNS:  BP (!) 125/56   Pulse 63   Temp 97.9 °F (36.6 °C) (Oral)   Resp 17   Ht 5' 7.99\" (1.727 m)   Wt 132 lb 7.9 oz (60.1 kg)   SpO2 93%   BMI 20.15 kg/m²   Tmax over 24 hours:  Temp (24hrs), Av.8 °F (36.6 °C), Min:97.3 °F (36.3 °C), Max:98.1 °F (36.7 °C)      Patient Vitals for the past 6 hrs:   BP Temp Temp src Pulse Resp SpO2 Height   22 1608 -- -- -- -- -- 93 % --   22 1602 (!) 125/56 97.9 °F (36.6 °C) Oral 63 17 93 % --   22 1420 -- -- -- -- -- -- 5' 7.99\" (1.727 m)   22 1300 -- -- -- 62 -- -- --   22 1130 -- -- -- 66 20 92 % --           Intake/Output Summary (Last 24 hours) at 2022 1723  Last data filed at 2022 0713  Gross per 24 hour   Intake 620 ml   Output 2000 ml   Net -1380 ml       Wt Readings from Last 2 Encounters:   22 132 lb 7.9 oz (60.1 kg)   22 138 lb 6.4 oz (62.8 kg)     Body mass index is 20.15 kg/m². Physical Exam  Constitutional:       Appearance: He is not diaphoretic. Pulmonary:      Effort: Pulmonary effort is normal.   Neurological:      Cranial Nerves: No cranial nerve deficit.    Psychiatric:         Mood and Affect: Mood normal.          Current Medications      cefTRIAXone (ROCEPHIN) IV  1,000 mg IntraVENous Q24H    azithromycin  500 mg Oral Daily    carvedilol  3.125 mg Oral Daily    pantoprazole  40 mg Oral QAM AC    sevelamer  800 mg Oral TID WC    sodium chloride flush  5-40 mL IntraVENous 2 times per day    dexamethasone  6 mg IntraVENous Q24H    albuterol sulfate HFA  2 puff Inhalation Q4H    heparin (porcine)  5,000 Units SubCUTAneous 3 times per day         Labs and Imaging Studies   Laboratory findings:  XR CHEST PORTABLE    Result Date: 12/25/2022  EXAMINATION: ONE X-RAY VIEW OF THE CHEST 12/25/2022 2:22 pm COMPARISON: 09/08/2022 HISTORY: ORDERING SYSTEM PROVIDED HISTORY: chest pain TECHNOLOGIST PROVIDED HISTORY: Reason for exam:->chest pain Reason for Exam: chest pain, COVID+ FINDINGS: The heart is normal size and configuration. The previously described right upper lobe mass is not evident on the current exam.  Bilateral lung infiltrates are present worrisome for COVID-19 pneumonia given the patient's clinical history. No pleural effusion is seen. No osseous abnormality is noted. Bilateral lung infiltrates highly concerning for COVID-19 pneumonia given the patient's clinical history. Nonvisualization of the previously described right upper lobe mass. CTA PULMONARY W CONTRAST    Result Date: 12/25/2022  EXAMINATION: CTA OF THE CHEST 12/25/2022 6:18 pm TECHNIQUE: CTA of the chest was performed after the administration of intravenous contrast.  Multiplanar reformatted images are provided for review. MIP images are provided for review. Automated exposure control, iterative reconstruction, and/or weight based adjustment of the mA/kV was utilized to reduce the radiation dose to as low as reasonably achievable. COMPARISON: 09/30/2022 HISTORY: ORDERING SYSTEM PROVIDED HISTORY: Hypoxia, metastatic melanoma in lung, need to rule out PE TECHNOLOGIST PROVIDED HISTORY: Reason for exam:->Hypoxia, metastatic melanoma in lung, need to rule out PE Reason for Exam: Hypoxia, metastatic melanoma in lung, need to rule out PE Additional signs and symptoms: none Relevant Medical/Surgical History: 75ml isovue 370/ dialysis patient FINDINGS: Pulmonary Arteries: Pulmonary arteries are adequately opacified for evaluation. No evidence of intraluminal filling defect to suggest pulmonary embolism. Main pulmonary artery is normal in caliber. Mediastinum: Mild cardiomegaly. No pericardial effusion.   Thoracic aorta normal in caliber with mild calcifications. No pathologically enlarged mediastinal or hilar lymph node. Chronic calcified right hilar lymph nodes are consistent with sequela of prior granulomatous disease. Lungs/pleura: New patchy and confluent predominantly subpleural ground-glass opacities that new patchy and confluent ground-glass opacities throughout both lungs in a subpleural and bronchovascular distribution. Mild bibasilar atelectasis. No pneumothorax or pleural effusion. Airways unremarkable. Interval decrease in size of a right upper lobe pulmonary nodule now measuring 1.4 x 1.1 cm compared to 2.3 x 1.9 cm previously. No new pulmonary nodule is identified. Upper Abdomen: Colonic diverticulosis. Small amount of pneumobilia. Soft Tissues/Bones: No acute osseous or soft tissue abnormality. Right axillary surgical clips. No enlarged axillary lymph node. 1. No evidence of pulmonary embolism. 2. Bilateral pulmonary ground-glass opacities. Commonly reported imaging features of COVID-19 pneumonia are present. Other processes such as influenza pneumonia and organizing pneumonia, as can be seen with drug toxicity and connective tissue disease, can cause a similar imaging pattern. PneTyp 3. Interval decrease in size of the patient's known right upper lobe pulmonary nodule. Continued follow-up is advised. 4. Pneumobilia of uncertain etiology. Correlation with history of prior sphincterotomy is recommended. 5. Colonic diverticulosis.        Recent Results (from the past 24 hour(s))   CBC with Auto Differential    Collection Time: 12/25/22  2:53 PM   Result Value Ref Range    WBC 14.7 (H) 4.0 - 10.5 K/CU MM    RBC 4.13 (L) 4.6 - 6.2 M/CU MM    Hemoglobin 12.1 (L) 13.5 - 18.0 GM/DL    Hematocrit 36.8 (L) 42 - 52 %    MCV 89.1 78 - 100 FL    MCH 29.3 27 - 31 PG    MCHC 32.9 32.0 - 36.0 %    RDW 12.8 11.7 - 14.9 %    Platelets 093 464 - 265 K/CU MM    MPV 10.0 7.5 - 11.1 FL    Differential Type AUTOMATED DIFFERENTIAL     Segs Relative 90.6 (H) 36 - 66 %    Lymphocytes % 3.3 (L) 24 - 44 %    Monocytes % 5.2 (H) 0 - 4 %    Eosinophils % 0.0 0 - 3 %    Basophils % 0.3 0 - 1 %    Segs Absolute 13.3 K/CU MM    Lymphocytes Absolute 0.5 K/CU MM    Monocytes Absolute 0.8 K/CU MM    Eosinophils Absolute 0.0 K/CU MM    Basophils Absolute 0.0 K/CU MM    Nucleated RBC % 0.0 %    Total Nucleated RBC 0.0 K/CU MM    TRC 0.0727 K/CU MM    Total Immature Neutrophil 0.09 K/CU MM    Immature Neutrophil % 0.6 (H) 0 - 0.43 %   Comprehensive Metabolic Panel    Collection Time: 12/25/22  2:53 PM   Result Value Ref Range    Sodium 133 (L) 135 - 145 MMOL/L    Potassium 3.9 3.5 - 5.1 MMOL/L    Chloride 91 (L) 99 - 110 mMol/L    CO2 26 21 - 32 MMOL/L    BUN 35 (H) 6 - 23 MG/DL    Creatinine 4.7 (H) 0.9 - 1.3 MG/DL    Est, Glom Filt Rate 12 (L) >60 mL/min/1.73m2    Glucose 99 70 - 99 MG/DL    Calcium 9.2 8.3 - 10.6 MG/DL    Albumin 3.4 3.4 - 5.0 GM/DL    Total Protein 6.7 6.4 - 8.2 GM/DL    Total Bilirubin 1.0 0.0 - 1.0 MG/DL    ALT 16 10 - 40 U/L    AST 28 15 - 37 IU/L    Alkaline Phosphatase 86 40 - 129 IU/L    Anion Gap 16 4 - 16   Troponin    Collection Time: 12/25/22  2:53 PM   Result Value Ref Range    Troponin T 0.037 (H) <0.01 NG/ML   Brain Natriuretic Peptide    Collection Time: 12/25/22  2:53 PM   Result Value Ref Range    Pro-BNP 26,973 (H) <300 PG/ML   Respiratory Panel, Molecular, with COVID-19 (Restricted: peds pts or suitable admitted adults)    Collection Time: 12/25/22  2:53 PM    Specimen: Nasopharyngeal   Result Value Ref Range    Adenovirus Detection by PCR NOT DETECTED NOT DETECTED    Coronavirus 229E PCR NOT DETECTED NOT DETECTED    Coronavirus HKU1 PCR NOT DETECTED NOT DETECTED    Coronavirus NL63 PCR NOT DETECTED NOT DETECTED    Coronavirus OC43 PCR NOT DETECTED NOT DETECTED    SARS-CoV-2 DETECTED BY PCR (A) NOT DETECTED    Human Metapneumovirus PCR NOT DETECTED NOT DETECTED    Rhinovirus Enterovirus PCR NOT DETECTED NOT DETECTED Influenza A by PCR NOT DETECTED NOT DETECTED    Influenza A H1 Pandemic PCR NOT DETECTED NOT DETECTED    Influenza A H1 (2009) PCR NOT DETECTED NOT DETECTED    Influenza A H3 PCR NOT DETECTED NOT DETECTED    Influenza B by PCR NOT DETECTED NOT DETECTED    Parainfluenza 1 PCR NOT DETECTED NOT DETECTED    Parainfluenza 2 PCR NOT DETECTED NOT DETECTED    Parainfluenza 3 PCR NOT DETECTED NOT DETECTED    Parainfluenza 4 PCR NOT DETECTED NOT DETECTED    RSV PCR NOT DETECTED NOT DETECTED    Bordetella parapertussis by PCR NOT DETECTED NOT DETECTED    B Pertussis by PCR NOT DETECTED NOT DETECTED    Chlamydophila Pneumonia PCR NOT DETECTED NOT DETECTED    Mycoplasma pneumo by PCR NOT DETECTED NOT DETECTED   Lactic Acid    Collection Time: 12/25/22  2:53 PM   Result Value Ref Range    Lactate 2.2 (HH) 0.5 - 1.9 mMOL/L   EKG 12 Lead    Collection Time: 12/25/22  2:54 PM   Result Value Ref Range    Ventricular Rate 84 BPM    Atrial Rate 84 BPM    P-R Interval 174 ms    QRS Duration 110 ms    Q-T Interval 390 ms    QTc Calculation (Bazett) 460 ms    P Axis 48 degrees    R Axis -48 degrees    T Axis 24 degrees    Diagnosis        Poor data quality, interpretation may be adversely affected  Normal sinus rhythm  Left axis deviation  Incomplete right bundle branch block  Moderate voltage criteria for LVH, may be normal variant  Junctional ST depression, probably abnormal  Abnormal ECG  When compared with ECG of 03-APR-2017 12:23,  Incomplete right bundle branch block has replaced Right bundle branch block  Confirmed by Pete Arreaga MD, Dax Alcantara ([id number]) on 12/26/2022 7:36:21 AM     Culture, Blood 1    Collection Time: 12/25/22  4:15 PM    Specimen: Blood   Result Value Ref Range    Specimen BLOOD     Special Requests       CALLED TO Richard Cuevas RN ON 12/26 AT 1025 BY LSCOTT MLT  GRAM VARIABLE BACILLI      Culture GRAM VARIABLE BACILLI IDENTIFICATION IN PROGRESS    High sensitivity CRP    Collection Time: 12/25/22  9:57 PM   Result Value Ref Range    CRP, High Sensitivity 214.7 (H) 0.0 - 5.0 mg/L   Procalcitonin    Collection Time: 12/25/22  9:57 PM   Result Value Ref Range    Procalcitonin 23.60    Basic Metabolic Panel w/ Reflex to MG    Collection Time: 12/26/22  4:46 AM   Result Value Ref Range    Sodium 129 (L) 135 - 145 MMOL/L    Potassium 4.0 3.5 - 5.1 MMOL/L    Chloride 91 (L) 99 - 110 mMol/L    CO2 25 21 - 32 MMOL/L    Anion Gap 13 4 - 16    BUN 48 (H) 6 - 23 MG/DL    Creatinine 5.0 (H) 0.9 - 1.3 MG/DL    Est, Glom Filt Rate 11 (L) >60 mL/min/1.73m2    Glucose 158 (H) 70 - 99 MG/DL    Calcium 8.8 8.3 - 10.6 MG/DL   High sensitivity CRP    Collection Time: 12/26/22  4:46 AM   Result Value Ref Range    CRP, High Sensitivity 213.6 (H) 0.0 - 5.0 mg/L           Electronically signed by Rin Lares MD on 12/27/2022 at 5:23 PM      Comment: Please note this report has been produced using speech recognition software and may contain errors related to that system including errors in grammar, punctuation, and spelling, as well as words and phrases that may be inappropriate. If there are any questions or concerns please feel free to contact the dictating provider for clarification.

## 2022-12-27 NOTE — PLAN OF CARE
Problem: Discharge Planning  Goal: Discharge to home or other facility with appropriate resources  12/27/2022 1011 by Cee James RN  Outcome: Progressing  Flowsheets (Taken 12/27/2022 3930)  Discharge to home or other facility with appropriate resources:   Identify barriers to discharge with patient and caregiver   Arrange for needed discharge resources and transportation as appropriate   Identify discharge learning needs (meds, wound care, etc)  12/27/2022 0026 by Divine Ocampo RN  Outcome: Progressing     Problem: ABCDS Injury Assessment  Goal: Absence of physical injury  12/27/2022 1011 by Cee James RN  Outcome: Progressing  12/27/2022 0026 by Divine Ocampo RN  Outcome: Progressing     Problem: Safety - Adult  Goal: Free from fall injury  12/27/2022 1011 by Cee James RN  Outcome: Progressing  12/27/2022 0026 by Divine Ocampo RN  Outcome: Progressing

## 2022-12-28 ENCOUNTER — APPOINTMENT (OUTPATIENT)
Dept: CT IMAGING | Age: 77
DRG: 871 | End: 2022-12-28
Payer: MEDICARE

## 2022-12-28 VITALS
HEART RATE: 68 BPM | OXYGEN SATURATION: 90 % | DIASTOLIC BLOOD PRESSURE: 57 MMHG | SYSTOLIC BLOOD PRESSURE: 131 MMHG | TEMPERATURE: 98 F | BODY MASS INDEX: 20.78 KG/M2 | RESPIRATION RATE: 17 BRPM | HEIGHT: 68 IN | WEIGHT: 137.13 LBS

## 2022-12-28 LAB
ANION GAP SERPL CALCULATED.3IONS-SCNC: 17 MMOL/L (ref 4–16)
BASOPHILS ABSOLUTE: 0 K/CU MM
BASOPHILS RELATIVE PERCENT: 0.2 % (ref 0–1)
BUN BLDV-MCNC: 72 MG/DL (ref 6–23)
CALCIUM SERPL-MCNC: 8.8 MG/DL (ref 8.3–10.6)
CHLORIDE BLD-SCNC: 95 MMOL/L (ref 99–110)
CO2: 21 MMOL/L (ref 21–32)
CREAT SERPL-MCNC: 5.7 MG/DL (ref 0.9–1.3)
DIFFERENTIAL TYPE: ABNORMAL
EOSINOPHILS ABSOLUTE: 0 K/CU MM
EOSINOPHILS RELATIVE PERCENT: 0 % (ref 0–3)
GFR SERPL CREATININE-BSD FRML MDRD: 10 ML/MIN/1.73M2
GLUCOSE BLD-MCNC: 142 MG/DL (ref 70–99)
HCT VFR BLD CALC: 32.9 % (ref 42–52)
HEMOGLOBIN: 10.9 GM/DL (ref 13.5–18)
IMMATURE NEUTROPHIL %: 1.3 % (ref 0–0.43)
LYMPHOCYTES ABSOLUTE: 0.9 K/CU MM
LYMPHOCYTES RELATIVE PERCENT: 4.9 % (ref 24–44)
MAGNESIUM: 2.4 MG/DL (ref 1.8–2.4)
MCH RBC QN AUTO: 28.9 PG (ref 27–31)
MCHC RBC AUTO-ENTMCNC: 33.1 % (ref 32–36)
MCV RBC AUTO: 87.3 FL (ref 78–100)
MONOCYTES ABSOLUTE: 0.9 K/CU MM
MONOCYTES RELATIVE PERCENT: 4.7 % (ref 0–4)
NUCLEATED RBC %: 0 %
PDW BLD-RTO: 12.5 % (ref 11.7–14.9)
PHOSPHORUS: 3.3 MG/DL (ref 2.5–4.9)
PLATELET # BLD: 259 K/CU MM (ref 140–440)
PMV BLD AUTO: 10 FL (ref 7.5–11.1)
POTASSIUM SERPL-SCNC: 4.3 MMOL/L (ref 3.5–5.1)
RBC # BLD: 3.77 M/CU MM (ref 4.6–6.2)
SEGMENTED NEUTROPHILS ABSOLUTE COUNT: 16.8 K/CU MM
SEGMENTED NEUTROPHILS RELATIVE PERCENT: 88.9 % (ref 36–66)
SODIUM BLD-SCNC: 133 MMOL/L (ref 135–145)
TOTAL IMMATURE NEUTOROPHIL: 0.24 K/CU MM
TOTAL NUCLEATED RBC: 0 K/CU MM
WBC # BLD: 18.9 K/CU MM (ref 4–10.5)

## 2022-12-28 PROCEDURE — 94761 N-INVAS EAR/PLS OXIMETRY MLT: CPT

## 2022-12-28 PROCEDURE — 71260 CT THORAX DX C+: CPT

## 2022-12-28 PROCEDURE — 2700000000 HC OXYGEN THERAPY PER DAY

## 2022-12-28 PROCEDURE — 97162 PT EVAL MOD COMPLEX 30 MIN: CPT

## 2022-12-28 PROCEDURE — 6360000004 HC RX CONTRAST MEDICATION: Performed by: INTERNAL MEDICINE

## 2022-12-28 PROCEDURE — 2580000003 HC RX 258: Performed by: INTERNAL MEDICINE

## 2022-12-28 PROCEDURE — 83735 ASSAY OF MAGNESIUM: CPT

## 2022-12-28 PROCEDURE — 6370000000 HC RX 637 (ALT 250 FOR IP): Performed by: NURSE PRACTITIONER

## 2022-12-28 PROCEDURE — 2500000003 HC RX 250 WO HCPCS: Performed by: INTERNAL MEDICINE

## 2022-12-28 PROCEDURE — 6360000002 HC RX W HCPCS: Performed by: INTERNAL MEDICINE

## 2022-12-28 PROCEDURE — 74177 CT ABD & PELVIS W/CONTRAST: CPT

## 2022-12-28 PROCEDURE — 97530 THERAPEUTIC ACTIVITIES: CPT

## 2022-12-28 PROCEDURE — 6370000000 HC RX 637 (ALT 250 FOR IP): Performed by: INTERNAL MEDICINE

## 2022-12-28 PROCEDURE — 2580000003 HC RX 258: Performed by: NURSE PRACTITIONER

## 2022-12-28 PROCEDURE — 97116 GAIT TRAINING THERAPY: CPT

## 2022-12-28 PROCEDURE — 85025 COMPLETE CBC W/AUTO DIFF WBC: CPT

## 2022-12-28 PROCEDURE — 94640 AIRWAY INHALATION TREATMENT: CPT

## 2022-12-28 PROCEDURE — 97165 OT EVAL LOW COMPLEX 30 MIN: CPT

## 2022-12-28 PROCEDURE — 36415 COLL VENOUS BLD VENIPUNCTURE: CPT

## 2022-12-28 PROCEDURE — 90935 HEMODIALYSIS ONE EVALUATION: CPT

## 2022-12-28 PROCEDURE — 6360000002 HC RX W HCPCS: Performed by: NURSE PRACTITIONER

## 2022-12-28 PROCEDURE — 84100 ASSAY OF PHOSPHORUS: CPT

## 2022-12-28 PROCEDURE — 80048 BASIC METABOLIC PNL TOTAL CA: CPT

## 2022-12-28 RX ORDER — ALBUTEROL SULFATE 90 UG/1
2 AEROSOL, METERED RESPIRATORY (INHALATION)
Status: DISCONTINUED | OUTPATIENT
Start: 2022-12-28 | End: 2022-12-28 | Stop reason: HOSPADM

## 2022-12-28 RX ADMIN — CARVEDILOL 3.12 MG: 6.25 TABLET, FILM COATED ORAL at 08:03

## 2022-12-28 RX ADMIN — SEVELAMER CARBONATE 800 MG: 800 TABLET, FILM COATED ORAL at 08:03

## 2022-12-28 RX ADMIN — SEVELAMER CARBONATE 800 MG: 800 TABLET, FILM COATED ORAL at 16:33

## 2022-12-28 RX ADMIN — PANTOPRAZOLE SODIUM 40 MG: 40 TABLET, DELAYED RELEASE ORAL at 05:11

## 2022-12-28 RX ADMIN — SODIUM CHLORIDE, PRESERVATIVE FREE 10 ML: 5 INJECTION INTRAVENOUS at 08:03

## 2022-12-28 RX ADMIN — SEVELAMER CARBONATE 800 MG: 800 TABLET, FILM COATED ORAL at 11:37

## 2022-12-28 RX ADMIN — CLINDAMYCIN IN 5 PERCENT DEXTROSE 900 MG: 18 INJECTION, SOLUTION INTRAVENOUS at 03:58

## 2022-12-28 RX ADMIN — ALBUTEROL SULFATE 2 PUFF: 90 AEROSOL, METERED RESPIRATORY (INHALATION) at 11:39

## 2022-12-28 RX ADMIN — ALBUTEROL SULFATE 2 PUFF: 90 AEROSOL, METERED RESPIRATORY (INHALATION) at 07:28

## 2022-12-28 RX ADMIN — HEPARIN SODIUM 5000 UNITS: 5000 INJECTION INTRAVENOUS; SUBCUTANEOUS at 13:31

## 2022-12-28 RX ADMIN — DEXAMETHASONE SODIUM PHOSPHATE 6 MG: 10 INJECTION INTRAMUSCULAR; INTRAVENOUS at 08:04

## 2022-12-28 RX ADMIN — IOPAMIDOL 80 ML: 755 INJECTION, SOLUTION INTRAVENOUS at 08:39

## 2022-12-28 RX ADMIN — CEFTRIAXONE SODIUM 1000 MG: 1 INJECTION, POWDER, FOR SOLUTION INTRAMUSCULAR; INTRAVENOUS at 13:31

## 2022-12-28 RX ADMIN — CLINDAMYCIN IN 5 PERCENT DEXTROSE 900 MG: 18 INJECTION, SOLUTION INTRAVENOUS at 11:39

## 2022-12-28 RX ADMIN — DEXTROSE MONOHYDRATE 3 MILLION UNITS: 50 INJECTION, SOLUTION INTRAVENOUS at 03:05

## 2022-12-28 RX ADMIN — AZITHROMYCIN MONOHYDRATE 500 MG: 250 TABLET ORAL at 08:03

## 2022-12-28 RX ADMIN — SODIUM CHLORIDE: 9 INJECTION, SOLUTION INTRAVENOUS at 11:38

## 2022-12-28 RX ADMIN — HEPARIN SODIUM 5000 UNITS: 5000 INJECTION INTRAVENOUS; SUBCUTANEOUS at 05:11

## 2022-12-28 RX ADMIN — ALBUTEROL SULFATE 2 PUFF: 90 AEROSOL, METERED RESPIRATORY (INHALATION) at 15:21

## 2022-12-28 ASSESSMENT — PAIN SCALES - GENERAL
PAINLEVEL_OUTOF10: 0

## 2022-12-28 NOTE — PROGRESS NOTES
[facility] THERAPY EVALUATION    Katerina Cali, 1945, 2011/2011-A, 12/28/2022    Discharge Recommendation: home with assist PRN    History:  [place]:  The primary encounter diagnosis was Acute hypoxemic respiratory failure due to COVID-19 [facility]). Diagnoses of Pneumonia due to COVID-19 virus and ESRD (end stage renal disease) on dialysis [facility]) were also pertinent to this visit. Patient  has a past medical history of Abnormal urine, Chronic kidney disease, stage 4, severely decreased GFR ([address] Nunez catheter in place, Hearing deficit, Hemodialysis patient [facility]), History of blood transfusion, Hypertension, Inguinal hernia, Missing teeth, acquired, and Wears glasses. Patient  has a past surgical history that includes Dialysis fistula creation (Left, 11/29/2016); hernia repair (Right, 11/29/2016); Prostate surgery (04/2017); Axillary Surgery (N/A, 6/22/2021); and Abdomen surgery (Right, 6/22/2021).     Subjective:  Patient states: \"I haven't had a chance to relax yet\"  Pain: denies  Communication with other providers: coeval with PT Hansel  Restrictions: general precautions, fall risk, monitor O2, droplet plus    Home Setup/Prior level of function:  Social/Functional History  Lives With: Alone  Type of Home: Apartment  Home Layout: One level  Home Access: Elevator (8th floor)  Bathroom Shower/Tub: Tub/Shower unit  Bathroom Toilet: Standard  Bathroom Equipment: Grab bars in shower, Grab bars around toilet  Has the patient had two or more falls in the past year or any fall with injury in the past year?: Yes (trips, has fallen getting in/out of car)  ADL Assistance: [facility] [place]: Independent  Ambulation Assistance: Independent (typically doesn't use AD)  Transfer Assistance: Independent  Active : Yes  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 Tele, 6L O2  Vision: WFL, glasses  Hearing: slight [facility]   Objective Measures: stable at rest, O2 low 90s    Body Systems and functions:  ROM: WFL in BUE  Strength: 4+/5 in BUE  Sensation: WFL  Tone: normotonic in BUE  Coordination: movements fluid and coordinated  Activity Tolerance: good    Activities of Daily Living (ADLs):  Feeding: ind  Grooming: SBA standing at sink  Toileting: SBA  UB dressing/bathing: ind  LB dressing/bathing: SBA    *pt ADL function inferred from gross functional assessment of mobility, balance, posture, safety awareness, activity tolerance (unless otherwise indicated)    Cognitive and Psychosocial Functioning:  Overall cognitive status: A/Ox4, WNL  Affect: pleasant    Functional Mobility:  Bed Mobility: SBA  Sit <> Stand: SBA    Ambulation: SBA using FWW    AM-PAC 6 click short form for inpatient daily activity:   How much help from another person does the patient currently need. .. Unable  Dep A Lot  Max A A Lot   Mod A A Little  Min A A Little   CGA  SBA None   Mod I  Indep  Sup   1. Putting on and taking off regular lower body clothing? [] 1    [] 2   [] 2   [] 3   [x] 3   [] 4      2. Bathing (including washing, rinsing, drying)? [] 1   [] 2   [] 2 [] 3 [x] 3 [] 4   3. Toileting, which includes using toilet, bedpan, or urinal? [] 1    [] 2   [] 2   [] 3   [x] 3   [] 4     4. Putting on and taking off regular upper body clothing? [] 1   [] 2   [] 2   [] 3   [] 3    [x] 4      5. Taking care of personal grooming such as brushing teeth? [] 1   [] 2    [] 2 [] 3    [] 3   [x] 4      6. Eating meals? [] 1   [] 2   [] 2   [] 3   [] 3   [x] 4        Raw Score:  21      24/24 = unimpaired  23/24 = 1-20% impaired   20/24-22/24 = 21-40% impaired  15/24-19/24 = 41-59% impaired   10/24-14/24 = 60%-79% impaired  7/24-9/24 = 80%-99% impaired  6/24 = 100% impaired     Treatment:    Therapeutic Activity Training (10 minutes):    Therapeutic activity training was instructed today. Cues were given for safety, sequence, UE/LE placement, visual cues, and balance. Activities performed today included     Supine to sit- SBA    LB dressing- donning/adjusting socks SBA seated    STS from EOB- SBA    Amb few laps in room- SBA using FWW, pt tolerating well    Stand to sit in chair- SBA    Education: Role of OT, OT POC, discharge needs, safety, benefits of EOB/OOB activity, AD/DME needs, Home safety  Safety Measures: Gait belt used for safety of pt and therapist, Left in recliner, Alarm in place, call light and phone within reach, lines managed    Assessment:  Pt is a 78 yr old male from home alone who presents with resp failure, COVID. Prior to admission, pt was ind with ADLs, IADLs, and mobility using no AD. Pt currently close to baseline, ind-SBA for ADLs, SBA for mobility using no AD. Pt with no significant strength, endurance, or functional deficits at this time, has returned to baseline function. At this time, pt and therapist agree that pt has no needs/goals for IP OT services and would be safe to return home with assist PRN. Thank you for the opportunity to work with this patient, please reorder services if needs arise.     Complexity: low  Prognosis: good  Barriers: none    Time:   Time in: 0915  Time out: 0939  Treatment Minutes: 10  Evaluation Minutes: 10  Total time: 24    Electronically signed by:      VINOD Bishop  12/28/2022, 1:25 PM

## 2022-12-28 NOTE — PROGRESS NOTES
Wound care spoke with pt's nurse pt is at CT scan right now unable to assess per the nurse pt does not have any issues with his skin at this time ok to d/c consult. Electronically signed by Ember Cortez RN on 12/28/2022 at 11:42 AM

## 2022-12-28 NOTE — PROGRESS NOTES
Physical Therapy  [facility] ACUTE CARE PHYSICAL THERAPY EVALUATION  Ritchie Valdovinos, 1945, 2011/2011-A, 12/28/2022    History  Andreafski:  The primary encounter diagnosis was Acute hypoxemic respiratory failure due to COVID-19 [facility]). Diagnoses of Pneumonia due to COVID-19 virus and ESRD (end stage renal disease) on dialysis [facility]) were also pertinent to this visit. Patient  has a past medical history of Abnormal urine, Chronic kidney disease, stage 4, severely decreased GFR ([address] Nunez catheter in place, Hearing deficit, Hemodialysis patient [facility]), History of blood transfusion, Hypertension, Inguinal hernia, Missing teeth, acquired, and Wears glasses. Patient  has a past surgical history that includes Dialysis fistula creation (Left, 11/29/2016); hernia repair (Right, 11/29/2016); Prostate surgery (04/2017); Axillary Surgery (N/A, 6/22/2021); and Abdomen surgery (Right, 6/22/2021). Assessment:  Pt presents 3 days s/p admission for hypoxia, SOB, tested COVID + since arrival. He is from home alone, lives in 1 level apartment, ind PLOF, no AD prior, family support available, no O2 at baseline. Pt is primarily limited by endurance, additional deficits include impaired functional mobility, balance, strength. Moving well w/ limited assist, endurance preventing ability to complete household distances, good ability to maintain safety t/o, social support available. Recommending home  w/ assist + HH PT. Complexity: Moderate  Prognosis: Fair +, endurance, social support may impact  Plan 2-3+/week, 1 week  Equipment: no new equipment anticipated    Recommendations for NURSING mobility: amb to and from BR    Subjective:  Patient states:  \"I still get short of breath moving around, I think the COVID and PNA isn't helping. \"  Pain:  denies .     Communication with other providers:  Handoff to RN, co-eval with OT Qing  Restrictions: fall risk; general precautions; droplet + ; contact    Home Setup/Prior level of function  Social/Functional History  Lives With: Alone  Type of Home: Apartment  Home Layout: One level  Home Access: Elevator (8th floor)  Bathroom Shower/Tub: Tub/Shower unit  Bathroom Toilet: Standard  Bathroom Equipment: Grab bars in shower, Grab bars around toilet  Has the patient had two or more falls in the past year or any fall with injury in the past year?: Yes (trips, has fallen getting in/out of car)  ADL Assistance: [address] [place] [address] Avenue: Independent  Ambulation Assistance: Independent (typically doesn't use AD)  Transfer Assistance: Independent  Active : Yes  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 He is feeling better, still limited by SOB. Tele, pulse ox, bed alarm in place upon arrival  Posture: Good  Vision:  glasses  Hearing:  BL hearing aides  Cardiopulmonary:  desat on 6 L of nc O2 unable to quantify 2/2 poor pleth t/o ; HR stilly tachy w/ activity ; otherwise WFL  Cognition: A&O x 4 ; alert, follows commands w/o difficulty, attention intact, memory appears intact, good safety awareness, min cues needed for sequencing/setup, no cues needed for initiation, good insight into deficits    Musculoskeletal  ROM R/L: AROM WFL. Strength R/L:  grossly >3/5, as observed in function and ROM. Sensation: WFL  Tone: normotonic  Coordination: WFL  Proprioception: WFL    Mobility:  Supine to sit:  SBA  Transfers: SBA  Sitting balance:  good  Standing balance:  fair +.     Gait: 60 ft using FWW at CGA progressing to SBA; dec logan, very light UE support on AD, stable/steady/reciprocal, endurance limiting distance, good safety awareness and attention to balance t/o    AMPAC 6 Clicks Inpatient Mobility:  AM-PAC Inpatient Mobility Raw Score : 20    Goals:  Pt Goals: return

## 2022-12-28 NOTE — PROGRESS NOTES
[address] [place] [address], [address] [address]  Phone: [phone]  Office Hours: 8:30AM - 4:30PM  Monday - Friday      Nephrology  Dialysis Note        PROCEDURE:  Patient seen during hemodialysis      PHYSICIAN:  KEN      INDICATION:  End-stage renal disease      RX:  See dialysis flowsheet for specifics on access, blood flow rate, dialysate baths, duration of dialysis, anticoagulation and other technical information. COMMENTS:  his blood cultures are positive for Staph epi and Clostridium  Will add Vanco 1.5 gram  He is on PCN and clinda  Can stop zithromax nd ceftriaxone  Discussed with Dr Manuel Clark  He does not want to be transferred out as all his doctors are here in town and is clinically improving.

## 2022-12-28 NOTE — PLAN OF CARE
Problem: Discharge Planning  Goal: Discharge to home or other facility with appropriate resources  Outcome: Progressing  Flowsheets (Taken 12/28/2022 1954)  Discharge to home or other facility with appropriate resources:   Identify barriers to discharge with patient and caregiver   Arrange for needed discharge resources and transportation as appropriate   Identify discharge learning needs (meds, wound care, etc)     Problem: ABCDS Injury Assessment  Goal: Absence of physical injury  Outcome: Progressing     Problem: Safety - Adult  Goal: Free from fall injury  Outcome: Progressing     Problem: Nutrition Deficit:  Goal: Optimize nutritional status  Outcome: Progressing

## 2022-12-28 NOTE — PROGRESS NOTES
Multiple attempts made to call the floor with no answer. Answer at 2200. RN unable to leave the floor with the patient at this time. RN will call when patient is ready to come down for CT scans.

## 2022-12-28 NOTE — RT PROTOCOL NOTE
RT Inhaler-Nebulizer Bronchodilator Protocol Note    There is a bronchodilator order in the chart from a provider indicating to follow the RT Bronchodilator Protocol and there is an Initiate RT Inhaler-Nebulizer Bronchodilator Protocol order as well (see protocol at bottom of note). CXR Findings:  No results found. The findings from the last RT Protocol Assessment were as follows:   History Pulmonary Disease: Smoker 15 pack years or more  Respiratory Pattern: Mild dyspnea at rest, irregular pattern, or RR 21-25 bpm  Breath Sounds: Inspiratory and expiratory or bilateral wheezing and/or rhonchi  Cough: Strong, spontaneous, non-productive  Indication for Bronchodilator Therapy: Wheezing associated with pulm disorder  Bronchodilator Assessment Score: 11    Aerosolized bronchodilator medication orders have been revised according to the RT Inhaler-Nebulizer Bronchodilator Protocol below. Respiratory Therapist to perform RT Therapy Protocol Assessment initially then follow the protocol. Repeat RT Therapy Protocol Assessment PRN for score 0-3 or on second treatment, BID, and PRN for scores above 3. No Indications - adjust the frequency to every 6 hours PRN wheezing or bronchospasm, if no treatments needed after 48 hours then discontinue using Per Protocol order mode. If indication present, adjust the RT bronchodilator orders based on the Bronchodilator Assessment Score as indicated below. Use Inhaler orders unless patient has one or more of the following: on home nebulizer, not able to hold breath for 10 seconds, is not alert and oriented, cannot activate and use MDI correctly, or respiratory rate 25 breaths per minute or more, then use the equivalent nebulizer order(s) with same Frequency and PRN reasons based on the score. If a patient is on this medication at home then do not decrease Frequency below that used at home.     0-3 - enter or revise RT bronchodilator order(s) to equivalent RT Bronchodilator order with Frequency of every 4 hours PRN for wheezing or increased work of breathing using Per Protocol order mode. 4-6 - enter or revise RT Bronchodilator order(s) to two equivalent RT bronchodilator orders with one order with BID Frequency and one order with Frequency of every 4 hours PRN wheezing or increased work of breathing using Per Protocol order mode. 7-10 - enter or revise RT Bronchodilator order(s) to two equivalent RT bronchodilator orders with one order with TID Frequency and one order with Frequency of every 4 hours PRN wheezing or increased work of breathing using Per Protocol order mode. 11-13 - enter or revise RT Bronchodilator order(s) to one equivalent RT bronchodilator order with QID Frequency and an Albuterol order with Frequency of every 4 hours PRN wheezing or increased work of breathing using Per Protocol order mode. Greater than 13 - enter or revise RT Bronchodilator order(s) to one equivalent RT bronchodilator order with every 4 hours Frequency and an Albuterol order with Frequency of every 2 hours PRN wheezing or increased work of breathing using Per Protocol order mode. RT to enter RT Home Evaluation for COPD & MDI Assessment order using Per Protocol order mode.     Electronically signed by Luiza Xiao RCP on 12/28/2022 at 10:03 AM

## 2022-12-28 NOTE — PROGRESS NOTES
Pt tolerated tx well. 1500 Uf removed. 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 ([facility]    Lung mass    Acute respiratory failure ([facility]    Moderate malnutrition ([facility]         Treatment:  Hemodialysis 1:1  Priority: Routine  Location: Bedside    Diabetic: No  NPO: No  Isolation Precautions: Airborne     Consent for Treatment Verified: Yes  Blood Consent Verified: Not Applicable     Safety Verified: Identify (I), Consent (C), Equipment (E), HepB Status (B), Orders Complete (O), Access Verified (A), and Timeliness (T)  Time out performed prior to access at 1550 hours. Report Received from Primary RN at 1530 hours.   Primary RN (First Initial, Last Name, Title): ANIKET sims  Incapacitated Nurse Education Completed: Yes     HBsAg ONLY:  Date Drawn: 2022       Results: Negative  HBsAb:  Date Drawn:  2022       Results: Immune >10    Order  Dialysis Bath  K+ (Potassium): 3  Ca+ (Calcium): 2.5  Na+ (Sodium): 138  HCO3 (Bicarb): 30  Bicarbonate Concentrate Lot No.: [id number]  Acid Concentrate Lot No.: [id number]     Na+ Modeling: Not Applicable  Dialyzer: [id number]  Dialysate Temperature (C):  35  Blood Flow Rate (BFR):  300   Dialysate Flow Rate (DFR):   600        Access to be Utilized   Access: AVF  Location: Upper Extremity  Side: Left   Needle gauge:  15  + Bruit/Thrill: Yes    First Use X-ray Verified: Not Applicable  OK to use line order: Not Applicable    Site Assessment:  Signs and Symptoms of Infection/Inflammation: None  If yes: Not Applicable  Dressing: Dry and Intact  Site Prep: Medical Aseptic Technique  Dressing Changed this Treatment: No  If yes, by whom: NA - not changed today  Date of Last Dressing Change: Not Applicable  Antimicrobial Patch in place?: No  Red Alcohol Caps in place?: No  Gauze Dressing?: Yes  Non Dialysis Use?: No  Comment:    Flows: Good, Patent  If access problem, who was notified:     Pre and Post-Assessment  Patient Vitals for the past 8 hrs:   Level of Consciousness Respiratory Quality/Effort O2 Device Abdomen Inspection Edema Pain Level   12/28/22 1139 0 -- High flow nasal cannula -- -- 0   12/28/22 1141 -- -- High flow nasal cannula -- -- --   12/28/22 1300 0 Dyspnea at rest;Dyspnea with exertion -- Soft None --   12/28/22 1525 -- -- High flow nasal cannula -- -- --   12/28/22 1531 0 -- High flow nasal cannula -- -- 0     Labs  Recent Labs     12/27/22  0039 12/28/22  0343   WBC 17.8* 18.9*   HGB 10.9* 10.9*   HCT 33.7* 32.9*    259                                                                  Recent Labs     12/26/22  0446 12/27/22  0039 12/28/22  0343   * 136 133*   K 4.0 3.8 4.3   CL 91* 98* 95*   CO2 25 25 21   BUN 48* 31* 72*   CREATININE 5.0* 3.6* 5.7*   GLUCOSE 158* 160* 142*     IV Drips and Rate/Dose   sodium chloride 20 mL/hr at 12/28/22 1138      Safety - Before each treatment:   Dialysis Machine No.: [id number]   Machine Number: [id number]  Dialyzer Lot No.: [id number]  RO Machine Log Sheet Completed: Yes  Machine Alarm Self Test: Completed; Passed (12/26/22 1437)     Air Foam Detector: Tested, Proper Function, pH Reading  Extracorporeal Circuit Tested for Integrity: Yes  Machine Conductivity: 14.1  Manual Conductivity: 14.2     Bicarbonate Concentrate Lot No.: [id number]  Acid Concentrate Lot No.: [id number]  Manual Ph: 7.4  Bleach Test (Neg): Yes  Bath Temperature: 95 °F (35 °C)  Tubing Lot#: [id number]  Conductivity Meter Serial #: [id number]  All Connections Secure?: Yes  Venous Parameters Set?: Yes  Arterial Parameters Set?: Yes  Saline Line Double Clamped?: Yes  Air Foam Detector Engaged?: Yes  Machine Functioning Alarm Free? Yes  Prime Given: 200ml    Chlorine Testing - Before each treatment and every 4 hours:   Treatment  Time On: 1552  Time Off: 1750  Treatment Goal: 2kg  Weight: 137 lb 2 oz (62.2 kg) (12/28/22 0735)  1st check: less than 0.1 ppm at: 1540 hours  2nd check: less than 0.1 ppm at: 1745 hours  3rd check: Not Applicable  (if greater than 0.1 ppm, then check every 30 minutes from secondary)    Access Flows and Pressures  Patient Vitals for the past 8 hrs:   Blood Flow Rate (ml/min) Ultrafiltration Rate (ml/hr) Arterial Pressure (mmHg) Venous Pressure (mmHg) TMP DFR Comments Access Visible   12/28/22 1555 200 ml/min 830 ml/hr -10 mmHg 60 40 600 tx started avf cannlated wo issues Yes   12/28/22 1600 300 ml/min 830 ml/hr -50 mmHg 120 50 600 talking w doc Yes   12/28/22 1615 300 ml/min 830 ml/hr -50 mmHg 120 50 600 eating dinner Yes   12/28/22 1630 300 ml/min 830 ml/hr -50 mmHg 150 50 600 dr. Feroz Wong and primary rn checking on pt Yes   12/28/22 1645 300 ml/min 830 ml/hr -50 mmHg 150 50 600 pt watching tv no needs Yes   12/28/22 1700 300 ml/min 830 ml/hr -50 mmHg 150 50 600 no needs at this time Yes   12/28/22 1715 300 ml/min 830 ml/hr -50 mmHg 160 50 600 talking w charge nurse Yes   12/28/22 1730 300 ml/min 830 ml/hr -50 mmHg 160 50 600 talking w dr Flori Reyes Yes   12/28/22 1745 300 ml/min 830 ml/hr -50 mmHg 160 50 600 watching tv Yes   12/28/22 1800 300 ml/min 830 ml/hr -50 mmHg 160 50 600 talking on the phone. pt being transferred to [facility] Yes   12/28/22 1815 300 ml/min 830 ml/hr -50 mmHg 160 50 600 tx ended.  blood returned wo issues Yes     Vital Signs  Patient Vitals for the past 8 hrs:   BP Temp Pulse Resp SpO2   12/28/22 1139 (!) 120/55 98.2 °F (36.8 °C) 65 17 96 %   12/28/22 1141 -- -- -- -- 96 %   12/28/22 1300 -- -- 65 -- --   12/28/22 1525 -- -- -- -- 95 %   12/28/22 1531 133/61 98 °F (36.7 °C) 70 23 95 %   12/28/22 1555 136/61 -- 62 18 94 %   12/28/22 1600 136/61 -- 62 16 95 %   12/28/22 1615 136/69 -- 71 17 91 %   12/28/22 1630 126/72 -- 67 18 94 %   12/28/22 1645 126/62 -- 64 17 (!) 89 %   12/28/22 1700 (!) 136/56 -- 64 17 95 %   12/28/22 1715 (!) 123/57 -- 60 15 95 %   12/28/22 1730 (!) 117/53 -- 69 17 93 %   12/28/22 1745 123/60 -- 68 17 90 %   12/28/22 1800 (!) 95/47 -- 70 18 (!) 88 %   12/28/22 1815 (!) 131/57 -- 68 17 90 %     Post-Dialysis  Arterial Catheter Locking Solution: Not Applicable  Venous Catheter Locking Solution: Not Applicable  Post-Treatment Procedures: Blood returned, Access bleeding time < 10 minutes  Machine Disinfection Process: Acid/Vinegar Clean, Heat Disinfect  Rinseback Volume (ml): 300 ml  Blood Volume Processed (Liters): 49.7 l/min  Dialyzer Clearance: Heavily streaked  Duration of Treatment (minutes): 135 minutes     Hemodialysis Intake (ml): 500 ml  Hemodialysis Output (ml): 2000 ml     Tolerated Treatment: Good  Patient Response to Treatment: tolerated well. Physician Notified: Yes       Provider Notification        Handoff complete and report given to Primary RN at 1830 hours. Primary RN (First Initial, Last Name, Title):   Rayray Briones     Education  Person Educated: Patient   Knowledge Base: Substantial  Barriers to Learning?: None  Preferred method of Learning: Oral  Topic(s): Procedural   Teaching Tools: Explanation   Response to Education: Verbalized Understanding     Electronically signed by Olivier Busby on 12/28/2022 at 6:48 PM

## 2022-12-28 NOTE — PROGRESS NOTES
Called down to dialysis to see what time they had the pt on the schedule. They stated they would be coming this afternoon to do bedside dialysis. Inquired about ABX that are due at 5, MAR states to hold them until after dialysis. Dialysis confirmed to hold ABX until they complete dialysis this afternoon.

## 2022-12-28 NOTE — RT PROTOCOL NOTE
RT Inhaler-Nebulizer Bronchodilator Protocol Note    There is a bronchodilator order in the chart from a provider indicating to follow the RT Bronchodilator Protocol and there is an Initiate RT Inhaler-Nebulizer Bronchodilator Protocol order as well (see protocol at bottom of note). CXR Findings:  No results found. The findings from the last RT Protocol Assessment were as follows:   History Pulmonary Disease: Smoker 15 pack years or more  Respiratory Pattern: Mild dyspnea at rest, irregular pattern, or RR 21-25 bpm  Breath Sounds: Inspiratory and expiratory or bilateral wheezing and/or rhonchi  Cough: Strong, spontaneous, non-productive  Indication for Bronchodilator Therapy: Wheezing associated with pulm disorder  Bronchodilator Assessment Score: 11    Aerosolized bronchodilator medication orders have been revised according to the RT Inhaler-Nebulizer Bronchodilator Protocol below. Respiratory Therapist to perform RT Therapy Protocol Assessment initially then follow the protocol. Repeat RT Therapy Protocol Assessment PRN for score 0-3 or on second treatment, BID, and PRN for scores above 3. No Indications - adjust the frequency to every 6 hours PRN wheezing or bronchospasm, if no treatments needed after 48 hours then discontinue using Per Protocol order mode. If indication present, adjust the RT bronchodilator orders based on the Bronchodilator Assessment Score as indicated below. Use Inhaler orders unless patient has one or more of the following: on home nebulizer, not able to hold breath for 10 seconds, is not alert and oriented, cannot activate and use MDI correctly, or respiratory rate 25 breaths per minute or more, then use the equivalent nebulizer order(s) with same Frequency and PRN reasons based on the score. If a patient is on this medication at home then do not decrease Frequency below that used at home.     0-3 - enter or revise RT bronchodilator order(s) to equivalent RT Bronchodilator order with Frequency of every 4 hours PRN for wheezing or increased work of breathing using Per Protocol order mode. 4-6 - enter or revise RT Bronchodilator order(s) to two equivalent RT bronchodilator orders with one order with BID Frequency and one order with Frequency of every 4 hours PRN wheezing or increased work of breathing using Per Protocol order mode. 7-10 - enter or revise RT Bronchodilator order(s) to two equivalent RT bronchodilator orders with one order with TID Frequency and one order with Frequency of every 4 hours PRN wheezing or increased work of breathing using Per Protocol order mode. 11-13 - enter or revise RT Bronchodilator order(s) to one equivalent RT bronchodilator order with QID Frequency and an Albuterol order with Frequency of every 4 hours PRN wheezing or increased work of breathing using Per Protocol order mode. Greater than 13 - enter or revise RT Bronchodilator order(s) to one equivalent RT bronchodilator order with every 4 hours Frequency and an Albuterol order with Frequency of every 2 hours PRN wheezing or increased work of breathing using Per Protocol order mode. RT to enter RT Home Evaluation for COPD & MDI Assessment order using Per Protocol order mode.     Electronically signed by Venessa Chowdhury RCP on 12/28/2022 at 10:02 AM

## 2022-12-29 NOTE — DISCHARGE SUMMARY
V2.0  Discharge Summary    Name:  Bud Pickett /Age/Sex: 1945 (89 y.o. male)   Admit Date: 2022  Discharge Date: 22    MRN & CSN:  [id number] & [id number] Encounter Date and Time 22 7:17 PM EST    Attending:  No att. providers found Discharging Provider: Gail Franco MD       Hospital Course:     Brief HPI: Bud Pickett is a 78 y.o. male with a ESRD who presented to ED on  with shortness of breath for several days. He had tested positive for COVID 5 days prior to admission. In the ED he was diagnosed with COVID-19 pneumonia, and he was admitted on . Brief Problem Based Course:     I assumed care on . From the COVID perspective at that point he was requiring anywhere from 4 to 7 L oxygen. On  blood cultures came back showing Clostridium perfringens. CT scan of the abdomen done on  to look for source of bacteremia showed several findings including an under distended section of the sigmoid colon-this area appeared hypermetabolic on a PET scan done in October per radiologist.  Colonoscopy suggested by radiologist at that time (in October). Radiologist who read the CT scan this admission continues to recommend a colonoscopy. An outpatient colonoscopy is scheduled on 1/3/2023 by Dr. Patricia Gamez -however meanwhile he is now admitted. It is possible that he may have a colonic source of Clostridium perfringens. Cannot rule out other sources. Infectious diseases not available at this time. He has been transferred to [facility] for further care. Problem list    Acute hypoxic respiratory failure  -O2 sat was reported to be 60% upon EMS arrival, and he was placed on nonrebreather mask at the scene  -Changed to 4 L in the ED upon arrival, and needed a maximum of 7 L oxygen yesterday.   Improved to 3 L today, on , the day of transfer    COVID 19 viral infection   -Is vaccinated - initial 2 shots + 1 booster per pt  -Dexamethasone 10 mg IV given 12/25 in the ED, followed with dexamethasone 6 mg IV daily since 12/26  -Baricitinib not given due to ESRD    Secondary bacterial pneumonia was suspected upon admission  -Procalcitonin level was 28.36-trended down to 26.02 on 12/27-in retrospect this is likely due to Clostridium perfringens bacteremia rather than bacterial pneumonia  -Zithromax 500 mg-3 doses given while here-stopped 12/28  -Ceftriaxone 1 g IV every 24 hours-3 doses given while here     Clostridium perfringens bacteremia  -Penicillin 3,000,000 units every 6 hours ordered on 12/27-only 1 dose given so far.   Some of the dosing was interrupted because of dialysis  -Clindamycin 900 mg IV every 8 hours started 12/27-has had 3 doses so far  -Pneumobilia noted on CTA chest-consider biliary source also    Staph species, coag neg in 1 blood cx 12/25 - could be contaminant - repeat blood cx ordered 12/27 results pending, [facility] [place] - phone is [phone]     End-stage renal disease  -Has fistula in left upper extremity, started in dialysis about 5 years ago  -On hemodialysis MWF, had HD 12/26 and 12/28, follows with Dr. Alena Ramos    Metastatic melanoma  -Had lesion from back removed in June 2021  -Diagnosed with metastases in 2022  -PET scan in October 2022 showed a hypermetabolic right upper lobe lung nodule (bx in Nov 2022 consistent with melanoma), and PET scan also shoed focal FDG uptake in the proximal sigmoid colon  -per oncologist Dr. Jak Morrissey note in EHR BRAF sent but insufficient tissue  -Immunotherapy with pembrolizumab (Neto Alvarez) started-had 1 dose on 12/1/2022 and next dose due 12/30/2022  -Oncology was planning repeat imaging after 3 months of therapy, but it is encouraging that the right upper lobe pulmonary nodule on CT scan done upon this admission has decreased in size compared to the CT scan from September     Elevated troponin  -In setting of end-stage renal disease  -Denied chest pain  -Troponin T level 0.037 on admission    Sepsis criteria met on admission - lactate and WBC count were high     Hypertension  -Continue Coreg    Abnormal CT of bladder-showing wall thickening-could be chronic outlet obstruction or cystitis per radiologist.  He does have a known history of prostate enlargement.  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 Former smoker - quit 1992      Consults this admission:  [address] TO CASE MANAGEMENT  IP CONSULT TO PHARMACY    Discharge Diagnosis:   Acute respiratory failure ([facility] [address]  COVID-19 pneumonia  Clostridium perfringens bacteremia  Metastatic melanoma    Discharge Instruction:     Transferred to [place]    Outpatient medications listed in EHR:        Medication List        ASK your doctor about these medications      Amoxicillin 500 MG Tabs     carvedilol 3.125 MG tablet  Commonly known as: COREG  Take 1 tablet by mouth daily     isosorbide mononitrate 30 MG extended release tablet  Commonly known as: IMDUR     nitroGLYCERIN 0.2 MG/HR  Commonly known as: NITRODUR     ondansetron 8 MG tablet  Commonly known as: Zofran  Take 1 tablet by mouth every 8 hours as needed for Nausea or Vomiting     pantoprazole 40 MG tablet  Commonly known as: PROTONIX     karen-maikel Tabs     Renvela 800 MG tablet  Generic drug: sevelamer             Objective Findings at Discharge:   BP (!) 131/57   Pulse 68   Temp 98 °F (36.7 °C) (Oral)   Resp 17   Ht 5' 7.99\" (1.727 m)   Wt 137 lb 2 oz (62.2 kg)   SpO2 90%   BMI 20.85 kg/m²       Physical Exam:   General: NAD  Eyes: EOMI  Pulmonary: Respiratory effort nonlabored at rest  Neuro: Alert. Psych: Mood appropriate.          Labs and Imaging   CT CHEST W CONTRAST    Result Date: 12/28/2022  EXAMINATION: CT OF THE CHEST WITH CONTRAST 12/28/2022 8:39 am TECHNIQUE: CT of the chest was performed with the administration of intravenous contrast. Multiplanar reformatted images are provided for review. Automated exposure control, iterative reconstruction, and/or weight based adjustment of the mA/kV was utilized to reduce the radiation dose to as low as reasonably achievable. COMPARISON: 12/25/2022 HISTORY: ORDERING SYSTEM PROVIDED HISTORY: Clostridium perfringes bacteremia, rule out abscess or source TECHNOLOGIST PROVIDED HISTORY: Reason for exam:->Clostridium perfringes bacteremia, rule out abscess or source Reason for Exam: Clostridium perfringes bacteremia, rule out abscess or source FINDINGS: Streak artifact and motion limit evaluation. Mediastinum: The heart is unchanged in size. No pericardial effusion. No acute aortic abnormality identified. Atherosclerosis. No new mediastinal or hilar adenopathy. The esophagus is within normal limits. Lungs/pleura: The trachea and central bronchi are patent. Right upper lobe nodule measures 1.2 x 0.9 cm, similar to prior. Peripheral and basilar predominant ground-glass and dense consolidations persist, dense consolidations appear more confluent from prior. No pleural effusion or pneumothorax. Upper Abdomen: Findings are reported separately in dedicated CT abdomen and pelvis report. Please see dedicated report. Soft Tissues/Bones: Bilateral gynecomastia. Diffuse demineralization. Multifocal degenerative changes. Persistent bilateral ground-glass and dense airspace consolidations appear slightly more confluent compared to prior. Findings could represent multifocal infection including atypical etiologies, drug toxicity, or organizing pneumonia. Follow-up imaging to document resolution is recommended. Right upper lobe nodule is similar to prior, attention on follow-up is recommended.  CT abdomen and pelvis findings are reported separately. Please see dedicated report. CT ABDOMEN PELVIS W IV CONTRAST Additional Contrast? None    Result Date: 12/28/2022  EXAMINATION: CT OF THE ABDOMEN AND PELVIS WITH CONTRAST, 12/28/2022 8:40 am TECHNIQUE: CT of the abdomen and pelvis was performed with the administration of intravenous contrast. Multiplanar reformatted images are provided for review. Automated exposure control, iterative reconstruction, and/or weight based adjustment of the mA/kV was utilized to reduce the radiation dose to as low as reasonably achievable. COMPARISON: CT PET 10/24/2022, CT chest 12/25/2022 HISTORY: ORDERING SYSTEM PROVIDED HISTORY:  Clostridium perfringes bacteremia, rule out abscess or source. TECHNOLOGIST PROVIDED HISTORY: Reason for Exam:  Clostridium perfringes bacteremia, rule out abscess or source. Additional Contrast?  None Reason for Exam:  Clostridium perfringes bacteremia, rule out abscess or source. FINDINGS: Lower Chest: Chest findings reported separately. Please see dedicated report. Organs: Unchanged pneumobilia. The gallbladder is under distended. Fatty infiltration of the pancreas. The spleen, adrenal glands, and kidneys are unchanged. Innumerable hypodensities within the kidneys, some of which are too small to adequately characterize by CT, favor cysts. No hydronephrosis or obstructing ureteral calculus. GI/Bowel: Evaluation is limited without enteric contrast.  No small bowel obstruction. No evidence of acute appendicitis. Diverticulosis of the large bowel without discrete acute diverticulitis. Persistent under distension of the proximal sigmoid colon, previously hypermetabolic on prior PET-CT. An underlying mass cannot be excluded. Pelvis: There is circumferential wall thickening of the urinary bladder. Prostatomegaly. Evaluation for intrapelvic abscess is limited without enteric contrast. Peritoneum/Retroperitoneum: Extensive atherosclerosis.   No definite free intraperitoneal gas. Evaluation for abscess is limited without enteric contrast. Bones/Soft Tissues: Diffuse demineralization and multifocal degenerative changes of the osseous structures. Fat filled umbilical hernia. Mild fat stranding overlying the right lower quadrant of the abdomen favors medication injection site. No discrete soft tissue abscess or drainable fluid collection definitively visualized. Fat density structure within the left hemiabdomen sidewall favors a lipoma. Similar appearance of the abdomen and pelvis without drainable abscess. Please note the evaluation is limited secondary to lack of enteric contrast. Similar pneumobilia. Recommend correlation with procedural history. Circumferential wall thickening of the urinary bladder may be due to chronic outlet obstruction versus cystitis. Recommend correlation with urinalysis. Hypermetabolic region of the proximal sigmoid colon remains under distended. Recommend correlation with colonoscopy if not previously performed. Prostatomegaly. Chest findings reported separately. Please see dedicated report. XR CHEST PORTABLE    Result Date: 12/25/2022  EXAMINATION: ONE X-RAY VIEW OF THE CHEST 12/25/2022 2:22 pm COMPARISON: 09/08/2022 HISTORY: ORDERING SYSTEM PROVIDED HISTORY: chest pain TECHNOLOGIST PROVIDED HISTORY: Reason for exam:->chest pain Reason for Exam: chest pain, COVID+ FINDINGS: The heart is normal size and configuration. The previously described right upper lobe mass is not evident on the current exam.  Bilateral lung infiltrates are present worrisome for COVID-19 pneumonia given the patient's clinical history. No pleural effusion is seen. No osseous abnormality is noted. Bilateral lung infiltrates highly concerning for COVID-19 pneumonia given the patient's clinical history. Nonvisualization of the previously described right upper lobe mass.      CTA PULMONARY W CONTRAST    Result Date: 12/25/2022  EXAMINATION: CTA OF THE CHEST 12/25/2022 6:18 pm TECHNIQUE: CTA of the chest was performed after the administration of intravenous contrast.  Multiplanar reformatted images are provided for review. MIP images are provided for review. Automated exposure control, iterative reconstruction, and/or weight based adjustment of the mA/kV was utilized to reduce the radiation dose to as low as reasonably achievable. COMPARISON: 09/30/2022 HISTORY: ORDERING SYSTEM PROVIDED HISTORY: Hypoxia, metastatic melanoma in lung, need to rule out PE TECHNOLOGIST PROVIDED HISTORY: Reason for exam:->Hypoxia, metastatic melanoma in lung, need to rule out PE Reason for Exam: Hypoxia, metastatic melanoma in lung, need to rule out PE Additional signs and symptoms: none Relevant Medical/Surgical History: 75ml isovue 370/ dialysis patient FINDINGS: Pulmonary Arteries: Pulmonary arteries are adequately opacified for evaluation. No evidence of intraluminal filling defect to suggest pulmonary embolism. Main pulmonary artery is normal in caliber. Mediastinum: Mild cardiomegaly. No pericardial effusion. Thoracic aorta normal in caliber with mild calcifications. No pathologically enlarged mediastinal or hilar lymph node. Chronic calcified right hilar lymph nodes are consistent with sequela of prior granulomatous disease. Lungs/pleura: New patchy and confluent predominantly subpleural ground-glass opacities that new patchy and confluent ground-glass opacities throughout both lungs in a subpleural and bronchovascular distribution. Mild bibasilar atelectasis. No pneumothorax or pleural effusion. Airways unremarkable. Interval decrease in size of a right upper lobe pulmonary nodule now measuring 1.4 x 1.1 cm compared to 2.3 x 1.9 cm previously. No new pulmonary nodule is identified. Upper Abdomen: Colonic diverticulosis. Small amount of pneumobilia. Soft Tissues/Bones: No acute osseous or soft tissue abnormality. Right axillary surgical clips.   No enlarged axillary lymph node. 1. No evidence of pulmonary embolism. 2. Bilateral pulmonary ground-glass opacities. Commonly reported imaging features of COVID-19 pneumonia are present. Other processes such as influenza pneumonia and organizing pneumonia, as can be seen with drug toxicity and connective tissue disease, can cause a similar imaging pattern. PneTyp 3. Interval decrease in size of the patient's known right upper lobe pulmonary nodule. Continued follow-up is advised. 4. Pneumobilia of uncertain etiology. Correlation with history of prior sphincterotomy is recommended. 5. Colonic diverticulosis. CBC:   Recent Labs     12/27/22  0039 12/28/22 0343   WBC 17.8* 18.9*   HGB 10.9* 10.9*    259     BMP:    Recent Labs     12/26/22  0446 12/27/22 0039 12/28/22 0343   * 136 133*   K 4.0 3.8 4.3   CL 91* 98* 95*   CO2 25 25 21   BUN 48* 31* 72*   CREATININE 5.0* 3.6* 5.7*   GLUCOSE 158* 160* 142*     Hepatic: No results for input(s): AST, ALT, ALB, BILITOT, ALKPHOS in the last 72 hours. Lipids: No results found for: CHOL, HDL, TRIG  Hemoglobin A1C: No results found for: LABA1C  TSH: No results found for: TSH  Troponin:   Lab Results   Component Value Date/Time    TROPONINT 0.037 12/25/2022 02:53 PM     Lactic Acid: No results for input(s): LACTA in the last 72 hours. BNP: No results for input(s): PROBNP in the last 72 hours.   UA:  Lab Results   Component Value Date/Time    NITRU NEGATIVE 09/05/2019 02:00 PM    COLORU YELLOW 09/05/2019 02:00 PM    WBCUA 1 09/05/2019 02:00 PM    RBCUA <1 09/05/2019 02:00 PM    TRICHOMONAS NONE SEEN 09/05/2019 02:00 PM    BACTERIA NEGATIVE 09/05/2019 02:00 PM    CLARITYU CLEAR 09/05/2019 02:00 PM    SPECGRAV 1.010 09/05/2019 02:00 PM    LEUKOCYTESUR NEGATIVE 09/05/2019 02:00 PM    UROBILINOGEN NORMAL 09/05/2019 02:00 PM    BILIRUBINUR NEGATIVE 09/05/2019 02:00 PM    BLOODU NEGATIVE 09/05/2019 02:00 PM    KETUA NEGATIVE 09/05/2019 02:00 PM     Time Spent Discharging patient 45 minutes    Electronically signed by Juan Miguel Nichols MD on 12/28/2022 at 7:17 PM

## 2022-12-30 LAB
CULTURE: ABNORMAL
Lab: ABNORMAL
Lab: ABNORMAL
SPECIMEN: ABNORMAL
SPECIMEN: ABNORMAL

## 2023-01-01 LAB
CULTURE: NORMAL
CULTURE: NORMAL
Lab: NORMAL
Lab: NORMAL
SPECIMEN: NORMAL
SPECIMEN: NORMAL

## 2023-01-13 ENCOUNTER — TELEPHONE (OUTPATIENT)
Dept: INFUSION THERAPY | Age: 78
End: 2023-01-13

## 2023-01-13 ENCOUNTER — CLINICAL DOCUMENTATION (OUTPATIENT)
Dept: ONCOLOGY | Age: 78
End: 2023-01-13

## 2023-01-13 NOTE — TELEPHONE ENCOUNTER
Called to check on pt as he missed his tx appt on 1/12. Pt states he was recently released from the hospital with covid & still not feeling.      Call transferred to YUAN Holloway

## 2023-01-13 NOTE — PROGRESS NOTES
Scheduling called patient to reschedule Ochoa Sprague since patient missed last tx d/t being in hospital. Patient currently c/o constipation and abdominal pain. Call transferred to this RN to discuss symptoms. Patient was recently at [facility] and transferred to [place]. Patient + for Covid. He was discharged on 01/04/2023. Patient appears SOB during conversation. Patient states he wears O2 @ 2 lpm ATC. Patient unsure of when exact last BM was. Patient states he picked up a prescription for lactulose today 01/13/2023 and has already taken first dose. Patient instructed to continue taking medication as prescribed. Advised patient to go to ER d/t SOB. Patient refusing to go to ER at this time. Physician after hours number provided. Strongly encourage patient to go to ER if symptoms persist or worsen. Patient voices understanding. Denies further needs at this time.

## 2023-02-02 ENCOUNTER — TELEPHONE (OUTPATIENT)
Dept: INFUSION THERAPY | Age: 78
End: 2023-02-02

## 2023-02-02 NOTE — TELEPHONE ENCOUNTER
Pt called stating he has cancelled his last couple tx appts due to being in the hospital & having other health issues after being released. Pt would like to now schedule tx 2 wks out (he was unaware of appt for today) appt given for 2/16 @ 10:15; pt voiced understanding.

## 2023-02-16 ENCOUNTER — HOSPITAL ENCOUNTER (OUTPATIENT)
Dept: INFUSION THERAPY | Age: 78
Discharge: HOME | End: 2023-02-16
Payer: MEDICARE

## 2023-02-16 ENCOUNTER — TELEPHONE (OUTPATIENT)
Dept: ONCOLOGY | Age: 78
End: 2023-02-16

## 2023-02-16 VITALS
SYSTOLIC BLOOD PRESSURE: 111 MMHG | WEIGHT: 131.2 LBS | BODY MASS INDEX: 19.95 KG/M2 | DIASTOLIC BLOOD PRESSURE: 61 MMHG | OXYGEN SATURATION: 95 % | RESPIRATION RATE: 18 BRPM | TEMPERATURE: 97.2 F | HEART RATE: 82 BPM

## 2023-02-16 DIAGNOSIS — R53.83 OTHER FATIGUE: ICD-10-CM

## 2023-02-16 DIAGNOSIS — C43.61 MALIGNANT MELANOMA OF RIGHT UPPER EXTREMITY INCLUDING SHOULDER (HCC): Primary | ICD-10-CM

## 2023-02-16 DIAGNOSIS — R91.8 LUNG MASS: ICD-10-CM

## 2023-02-16 LAB
ALBUMIN SERPL-MCNC: 3.3 GM/DL (ref 3.4–5)
ALP BLD-CCNC: 92 IU/L (ref 40–128)
ALT SERPL-CCNC: 7 U/L (ref 10–40)
ANION GAP SERPL CALCULATED.3IONS-SCNC: 14 MMOL/L (ref 4–16)
AST SERPL-CCNC: 13 IU/L (ref 15–37)
BASOPHILS ABSOLUTE: 0.1 K/CU MM
BASOPHILS RELATIVE PERCENT: 0.5 % (ref 0–1)
BILIRUB SERPL-MCNC: 0.5 MG/DL (ref 0–1)
BUN SERPL-MCNC: 26 MG/DL (ref 6–23)
CALCIUM SERPL-MCNC: 8.9 MG/DL (ref 8.3–10.6)
CHLORIDE BLD-SCNC: 95 MMOL/L (ref 99–110)
CO2: 28 MMOL/L (ref 21–32)
CREAT SERPL-MCNC: 4.8 MG/DL (ref 0.9–1.3)
DIFFERENTIAL TYPE: ABNORMAL
EOSINOPHILS ABSOLUTE: 0.2 K/CU MM
EOSINOPHILS RELATIVE PERCENT: 1.6 % (ref 0–3)
GFR SERPL CREATININE-BSD FRML MDRD: 12 ML/MIN/1.73M2
GLUCOSE SERPL-MCNC: 131 MG/DL (ref 70–99)
HCT VFR BLD CALC: 33.2 % (ref 42–52)
HEMOGLOBIN: 10.5 GM/DL (ref 13.5–18)
LYMPHOCYTES ABSOLUTE: 3 K/CU MM
LYMPHOCYTES RELATIVE PERCENT: 24.6 % (ref 24–44)
MCH RBC QN AUTO: 29.1 PG (ref 27–31)
MCHC RBC AUTO-ENTMCNC: 31.6 % (ref 32–36)
MCV RBC AUTO: 92 FL (ref 78–100)
MONOCYTES ABSOLUTE: 1 K/CU MM
MONOCYTES RELATIVE PERCENT: 8.3 % (ref 0–4)
PDW BLD-RTO: 15.4 % (ref 11.7–14.9)
PLATELET # BLD: 289 K/CU MM (ref 140–440)
PMV BLD AUTO: 9.6 FL (ref 7.5–11.1)
POTASSIUM SERPL-SCNC: 3 MMOL/L (ref 3.5–5.1)
RBC # BLD: 3.61 M/CU MM (ref 4.6–6.2)
SEGMENTED NEUTROPHILS ABSOLUTE COUNT: 7.9 K/CU MM
SEGMENTED NEUTROPHILS RELATIVE PERCENT: 65 % (ref 36–66)
SODIUM BLD-SCNC: 137 MMOL/L (ref 135–145)
T4 FREE SERPL-MCNC: 1.74 NG/DL (ref 0.9–1.8)
TOTAL PROTEIN: 5.2 GM/DL (ref 6.4–8.2)
TSH SERPL DL<=0.005 MIU/L-ACNC: 1.68 UIU/ML (ref 0.27–4.2)
WBC # BLD: 12.1 K/CU MM (ref 4–10.5)

## 2023-02-16 PROCEDURE — 6360000002 HC RX W HCPCS: Performed by: NURSE PRACTITIONER

## 2023-02-16 PROCEDURE — 80053 COMPREHEN METABOLIC PANEL: CPT

## 2023-02-16 PROCEDURE — 96413 CHEMO IV INFUSION 1 HR: CPT

## 2023-02-16 PROCEDURE — 85025 COMPLETE CBC W/AUTO DIFF WBC: CPT

## 2023-02-16 PROCEDURE — 84443 ASSAY THYROID STIM HORMONE: CPT

## 2023-02-16 PROCEDURE — 84439 ASSAY OF FREE THYROXINE: CPT

## 2023-02-16 PROCEDURE — 2580000003 HC RX 258: Performed by: NURSE PRACTITIONER

## 2023-02-16 RX ORDER — SODIUM CHLORIDE 0.9 % (FLUSH) 0.9 %
5-40 SYRINGE (ML) INJECTION PRN
Status: DISCONTINUED | OUTPATIENT
Start: 2023-02-16 | End: 2023-02-17 | Stop reason: HOSPADM

## 2023-02-16 RX ORDER — SODIUM CHLORIDE 9 MG/ML
5-250 INJECTION, SOLUTION INTRAVENOUS PRN
Status: DISCONTINUED | OUTPATIENT
Start: 2023-02-16 | End: 2023-02-17 | Stop reason: HOSPADM

## 2023-02-16 RX ADMIN — SODIUM CHLORIDE 20 ML/HR: 9 INJECTION, SOLUTION INTRAVENOUS at 10:51

## 2023-02-16 RX ADMIN — SODIUM CHLORIDE 200 MG: 9 INJECTION, SOLUTION INTRAVENOUS at 11:16

## 2023-02-16 RX ADMIN — SODIUM CHLORIDE, PRESERVATIVE FREE 10 ML: 5 INJECTION INTRAVENOUS at 11:53

## 2023-02-16 NOTE — PROGRESS NOTES
Ambulated to treatment suite for Keytruda infusion. Assessment complete. Pt weaned off of oxygen following recent COVID infection. Requests to see someone today regarding recent scan at hospital.  Pt states he was told he did not have cancer. He is concerned and wants to speak to someone regarding continuing treatment that [facility] not be necessary. \"    Daria CANTU at chairside discussing pt concerns. PIV #24 placed in right inner forearm without difficulty. Labs obtained. Flushed and locked with normal saline. Labs resulted, treatment plan approved and released. Keytruda infusion completed as ordered. PIV discontinued per protocol. Pt tolerated well. AVS provided. Discharge in stable condition.   Bernice Goodrich RN

## 2023-02-17 RX ORDER — POTASSIUM CHLORIDE 20 MEQ/1
20 TABLET, EXTENDED RELEASE ORAL 2 TIMES DAILY
Qty: 10 TABLET | Refills: 0 | Status: SHIPPED | OUTPATIENT
Start: 2023-02-17 | End: 2023-02-22

## 2023-03-03 DIAGNOSIS — C43.61 MALIGNANT MELANOMA OF RIGHT UPPER EXTREMITY INCLUDING SHOULDER (HCC): Primary | ICD-10-CM

## 2023-03-03 DIAGNOSIS — C43.59 MALIGNANT MELANOMA OF TORSO EXCLUDING BREAST (HCC): ICD-10-CM

## 2023-03-03 DIAGNOSIS — R91.8 MASS OF RIGHT LUNG: ICD-10-CM

## 2023-03-03 DIAGNOSIS — E44.0 MODERATE MALNUTRITION (HCC): ICD-10-CM

## 2023-03-09 ENCOUNTER — HOSPITAL ENCOUNTER (OUTPATIENT)
Dept: INFUSION THERAPY | Age: 78
Discharge: HOME | End: 2023-03-09
Payer: MEDICARE

## 2023-03-09 ENCOUNTER — OFFICE VISIT (OUTPATIENT)
Dept: ONCOLOGY | Age: 78
End: 2023-03-09
Payer: MEDICARE

## 2023-03-09 ENCOUNTER — TELEPHONE (OUTPATIENT)
Dept: ONCOLOGY | Age: 78
End: 2023-03-09

## 2023-03-09 VITALS
WEIGHT: 123.8 LBS | HEART RATE: 87 BPM | DIASTOLIC BLOOD PRESSURE: 64 MMHG | TEMPERATURE: 97.8 F | SYSTOLIC BLOOD PRESSURE: 153 MMHG | BODY MASS INDEX: 18.76 KG/M2 | OXYGEN SATURATION: 93 % | HEIGHT: 68 IN

## 2023-03-09 VITALS
RESPIRATION RATE: 18 BRPM | HEART RATE: 87 BPM | OXYGEN SATURATION: 93 % | TEMPERATURE: 97.8 F | WEIGHT: 123 LBS | HEIGHT: 68 IN | BODY MASS INDEX: 18.64 KG/M2 | DIASTOLIC BLOOD PRESSURE: 64 MMHG | SYSTOLIC BLOOD PRESSURE: 153 MMHG

## 2023-03-09 DIAGNOSIS — C43.61 MALIGNANT MELANOMA OF RIGHT UPPER EXTREMITY INCLUDING SHOULDER (HCC): Primary | ICD-10-CM

## 2023-03-09 DIAGNOSIS — N18.5 CHRONIC KIDNEY DISEASE (CKD), STAGE V (HCC): ICD-10-CM

## 2023-03-09 DIAGNOSIS — E46 PROTEIN-CALORIE MALNUTRITION, UNSPECIFIED SEVERITY (HCC): ICD-10-CM

## 2023-03-09 LAB
ALBUMIN SERPL-MCNC: 3.2 GM/DL (ref 3.4–5)
ALP BLD-CCNC: 96 IU/L (ref 40–128)
ALT SERPL-CCNC: 8 U/L (ref 10–40)
ANION GAP SERPL CALCULATED.3IONS-SCNC: 12 MMOL/L (ref 4–16)
AST SERPL-CCNC: 18 IU/L (ref 15–37)
BASOPHILS ABSOLUTE: 0.1 K/CU MM
BASOPHILS RELATIVE PERCENT: 0.4 % (ref 0–1)
BILIRUB SERPL-MCNC: 0.5 MG/DL (ref 0–1)
BUN SERPL-MCNC: 22 MG/DL (ref 6–23)
CALCIUM SERPL-MCNC: 8.7 MG/DL (ref 8.3–10.6)
CHLORIDE BLD-SCNC: 98 MMOL/L (ref 99–110)
CO2: 28 MMOL/L (ref 21–32)
CREAT SERPL-MCNC: 3.8 MG/DL (ref 0.9–1.3)
DIFFERENTIAL TYPE: ABNORMAL
EOSINOPHILS ABSOLUTE: 0.3 K/CU MM
EOSINOPHILS RELATIVE PERCENT: 2.3 % (ref 0–3)
GFR SERPL CREATININE-BSD FRML MDRD: 16 ML/MIN/1.73M2
GLUCOSE SERPL-MCNC: 92 MG/DL (ref 70–99)
HCT VFR BLD CALC: 33.4 % (ref 42–52)
HEMOGLOBIN: 10.6 GM/DL (ref 13.5–18)
LYMPHOCYTES ABSOLUTE: 2.6 K/CU MM
LYMPHOCYTES RELATIVE PERCENT: 20.7 % (ref 24–44)
MCH RBC QN AUTO: 29.1 PG (ref 27–31)
MCHC RBC AUTO-ENTMCNC: 31.7 % (ref 32–36)
MCV RBC AUTO: 91.8 FL (ref 78–100)
MONOCYTES ABSOLUTE: 0.9 K/CU MM
MONOCYTES RELATIVE PERCENT: 7.1 % (ref 0–4)
PDW BLD-RTO: 15.1 % (ref 11.7–14.9)
PLATELET # BLD: 260 K/CU MM (ref 140–440)
PMV BLD AUTO: 9.5 FL (ref 7.5–11.1)
POTASSIUM SERPL-SCNC: 3 MMOL/L (ref 3.5–5.1)
RBC # BLD: 3.64 M/CU MM (ref 4.6–6.2)
SEGMENTED NEUTROPHILS ABSOLUTE COUNT: 8.6 K/CU MM
SEGMENTED NEUTROPHILS RELATIVE PERCENT: 69.5 % (ref 36–66)
SODIUM BLD-SCNC: 138 MMOL/L (ref 135–145)
TOTAL PROTEIN: 5.4 GM/DL (ref 6.4–8.2)
TSH SERPL DL<=0.005 MIU/L-ACNC: 1.33 UIU/ML (ref 0.27–4.2)
WBC # BLD: 12.4 K/CU MM (ref 4–10.5)

## 2023-03-09 PROCEDURE — 2580000003 HC RX 258: Performed by: INTERNAL MEDICINE

## 2023-03-09 PROCEDURE — 84436 ASSAY OF TOTAL THYROXINE: CPT

## 2023-03-09 PROCEDURE — 6360000002 HC RX W HCPCS: Performed by: INTERNAL MEDICINE

## 2023-03-09 PROCEDURE — 84443 ASSAY THYROID STIM HORMONE: CPT

## 2023-03-09 PROCEDURE — 1123F ACP DISCUSS/DSCN MKR DOCD: CPT | Performed by: INTERNAL MEDICINE

## 2023-03-09 PROCEDURE — 80053 COMPREHEN METABOLIC PANEL: CPT

## 2023-03-09 PROCEDURE — 96413 CHEMO IV INFUSION 1 HR: CPT

## 2023-03-09 PROCEDURE — 99214 OFFICE O/P EST MOD 30 MIN: CPT | Performed by: INTERNAL MEDICINE

## 2023-03-09 PROCEDURE — 85025 COMPLETE CBC W/AUTO DIFF WBC: CPT

## 2023-03-09 RX ORDER — SODIUM CHLORIDE 0.9 % (FLUSH) 0.9 %
5-40 SYRINGE (ML) INJECTION PRN
Status: DISCONTINUED | OUTPATIENT
Start: 2023-03-09 | End: 2023-03-10 | Stop reason: HOSPADM

## 2023-03-09 RX ORDER — SODIUM CHLORIDE 9 MG/ML
5-250 INJECTION, SOLUTION INTRAVENOUS PRN
Status: DISCONTINUED | OUTPATIENT
Start: 2023-03-09 | End: 2023-03-10 | Stop reason: HOSPADM

## 2023-03-09 RX ADMIN — SODIUM CHLORIDE 200 MG: 9 INJECTION, SOLUTION INTRAVENOUS at 10:53

## 2023-03-09 RX ADMIN — SODIUM CHLORIDE 20 ML/HR: 9 INJECTION, SOLUTION INTRAVENOUS at 10:52

## 2023-03-09 NOTE — PROGRESS NOTES
MA Rooming Questions  Patient: Nikita Dial  MRN: [id number]    Date: 3/9/2023        1. Do you have any new issues? yes - weight loss- states he had COVID and was in hospital, loose bowels,          2. Do you need any refills on medications?    no    3. Have you had any imaging done since your last visit? yes -     4. Have you been hospitalized or seen in the emergency room since your last visit here?   yes -     5. Did the patient have a depression screening completed today?  No    No data recorded     PHQ-9 Given to (if applicable):               PHQ-9 Score (if applicable):                     [] Positive     []  Negative              Does question #9 need addressed (if applicable)                     [] Yes    []  No               Trey Webb CMA

## 2023-03-09 NOTE — PROGRESS NOTES
Pt. Here for treatment, Peripheral IV started without difficulty, good blood return noted. Lab work drawn as ordered. Labs review. Pt. Stated he has been having loose stools and has loss 8 lbs since his last visit. Nurse schedule pt. To see Dr. Evangelist Lawrence today. Lab results review, Dr. Evangelist Lawrence wants pt. To follow up with urologist and GI, Pt. Informed and verbalized understanding. He also stated okay to use immodium over the counter as directed. Patient's status assessed and documented appropriately. All labs and required results were also reviewed today. Treatment parameters have been reviewed. Today's treatment has been approved by the provider. Treatment orders and medication sequencing (when applicable) was verified by 2 registered nurses. The treatment plan was confirmed with the patient prior to administration, and the patient understands the need to report any treatment-related symptoms. Prior to administration, when applicable, the following 8 elements of medication administration were reviewed with 2nd Registered Nurse prior to dosing: drug name, drug dose, infusion volume when prepared in a syringe, rate of administration, expiration dates and/or times, appearance and integrity of drug(s), and rate of pump for infusion. The 5 rights of medication administration have been verified.

## 2023-03-09 NOTE — PROGRESS NOTES
Patient Name:  Eliz Sims  Patient :  1945  Patient MRN:  [id number]     Primary Oncologist: Barb Rivas MD  Referring Provider: Mitchael Kawasaki, MD     Date of Service: 3/9/2023       Chief Complaint:    Chief Complaint   Patient presents with    Follow-up    Chemotherapy      He came in for follow up visit. Patient Active Problem List:     Chronic kidney disease (CKD), stage IV (severe)      Right inguinal hernia     Urinary tract infection due to extended-spectrum beta lactamase (ESBL) producing Escherichia coli     Hemodialysis access site with mature fistula      HPI:   Luz Florentino is a pleasant 79-year-old male patient who was referred for evaluation of melanoma. He went to Dr Karena Winters for evaluation of mole to right back and had biopsy on May 19, 2021       He has red hair. He was referred to Dr Devonte Sanford for wide excision and sentinel lymph node biopsy. We went over NCCN guideline. He has pathological stage IIA. He had wide excision 21 by Dr. Karena Winters site has staples in place and appears to be healing well. Sentinal lymph node biopsy by Dr. Devonte Sanford. Reviewed guidelines for follow-up including :  H&P every 6 to 12 months for 5 years including emphasis on nodes and skin, then annually as indicated no routine blood tests. routine imaging on screen for asymptomatic recurrence or metastatic disease is not recommended imaging is indicated to investigate specific signs and symptoms  On hemodialysis 3 times a week. He has history of elevated PSA and prostate biopsy by Dr. Reno Arias. I encouraged him to follow-up with urologist.    10/11/2022 he was referred back by Dr Karena Winters. CT chest 2022 ordered by Dr Karena Winters:  1. Pulmonary mass in the right upper lobe. Recommend follow-up PET-CT or tissue sampling. Otherwise, 3 month follow-up CT chest.  2. Centrilobular emphysema with granulomatous changes in the right chest.  No additional nodule. 3. Mediastinal lymphadenopathy.     2022 he came in for follow up visit. He has EBUS at [facility] on 11/3/2022. Reportedly path showed metastatic melanoma. I ordered B-ethan study and offered immunotherapy. PET scan 10/24/2022:  1. Hypermetabolic right upper lobe pulmonary nodule compatible with  malignancy. While finding may represent metastatic disease from patient's known melanoma, finding remains more suspicious for a 2nd primary malignancy. Recommend tissue sampling if not already performed. 2. Focal area of FDG uptake is identified in the proximal sigmoid colon with under distension of the wall. Underlying mass cannot be entirely excluded. Consider colonoscopy if not recently performed. 3. Status post right axillary lymph node dissection. No hypermetabolic lymph nodes identified. 4. Severe diverticulosis. 5. Massive prostatomegaly. I offered immunotherapy, pembro q3wk. We discussed about potential SE of immunotherapy. We discussed about prognosis. 11/3/2022 Additional Path Testing  ALK ([facility]):  Negative                BRAF Mutation Analysis:  TNP (insufficient)  EGFR Mutation Analysis:  TNP (insufficient)   MET (FISH):  QNS  RET (FISH):  QNS  OS1 (FISH):  Negative    I remind him about colonoscopy. 3/9/2023 he came in for follow up visit. 12/1/2023 C1 keytruda every 3 weeks. [place] 2022 he had COVID PNA. Since then he has been having intermittent loose stool, no watery stool. He canceled colonoscopy and I remind him to reschedule the colonoscopy. 12/30/2022 CT AP  1. Mild circumferential mural thickening of the distal descending colon and sigmoid colon without surrounding inflammatory stranding. This may be secondary to infectious/inflammatory process or muscularis hypertrophy from diverticulosis. No additional acute abdominal or pelvic abnormality. 2. Multifocal patchy consolidative and groundglass airspace disease throughout both lower lungs, consistent with pneumonia.    3. Massive prostatomegaly with findings of chronic urinary outlet obstruction. 4. Splenomegaly. 5. Numerous ancillary findings, including bilateral renal atrophy with innumerable renal cysts, left lateral abdominal wall lipoma, and extensive atherosclerotic calcification throughout the arterial system. 3/9/2023 C3 keytruda. 3/9/2023 WBC 12.4. Hg 10. 6. plat 260. Will monitor TSH. I recommend to eat frequent meals. He will f/u with urologist.    No acute pain. Denied any nausea, vomiting or diarrhea. No fever or chills. No chest pain, shortness of breath or palpitation. No headache or dizzy spell. No specific bone pain. No melena or hematochezia. Denied any dysuria or hematuria. Past Medical History:   Diagnosis Date    Abnormal urine     abn urine culture 4/3/2017- OR for 4/13/2017 cancelled-rescheduled for 4/25/2017- ( on 4/24/2017 pt states finishes antibiotic today and had repeat urine culture at office last week)    Chronic kidney disease, stage 4, severely decreased GFR ([facility] [address] 09/2016    Dialysis on Mon-Wed-Fri    follows with Dr Cecilio Casas    Nunez catheter in place     Has had in place since 9/29/16    Hearing deficit     both ears --no hearing aids    Hemodialysis patient [facility])     Sees Dr Cecilio Casas    Dialysis on Mon-Wed-Fri    History of blood transfusion     Hypertension     Follows with Dr. Cecilio Casas    Inguinal hernia     Missing teeth, acquired     upper and lower  some broken off now    Wears glasses     for reading      Past Surgery History:     DIALYSIS FISTULA CREATION 11/29/2016 Left upper arm   HERNIA REPAIR Right 11/29/2016 INGUINAL HERNIA   PROSTATE SURGERY 04/2017 for BPH  Colonoscopy in 2019. Social History:  He smoked 2 PPD for 19 years and quit in 1995. Denied EtOH or illicit drug use. He has 3 children. Family History  No cancer in the family. Review of Systems: The remainder of ROS is unremarkable. Vital Signs: There were no vitals taken for this visit.      Physical Exam:  CONSTITUTIONAL: awake, alert, cooperative, no apparent distress   EYES: pupils equal, round and reactive to light. Sclera clear and conjunctiva normal  ENT: Normocephalic, without obvious abnormality, atraumatic  NECK: supple, symmetrical, no jugular venous distension and no carotid bruits   HEMATOLOGIC/LYMPHATIC: no cervical, supraclavicular or axillary lymphadenopathy   LUNGS: no increased work of breathing and clear to auscultation   CARDIOVASCULAR: regular rate and rhythm, normal S1 and S2, + murmur  ABDOMEN: normal bowel sound, soft, non-distended, non-tender, no masses palpated, no hepatosplenomegaly   MUSCULOSKELETAL: full range of motion noted, tone is normal  NEUROLOGIC: Motor skills grossly intact. CN II - XII grossly intact. SKIN: No jaundice. Healing scar from biopsy to right back  EXTREMITIES: no LE edema.  LUE fistula w/bruit/thrill    Labs:  Hematology:  Lab Results   Component Value Date    WBC 12.4 (H) 03/09/2023    RBC 3.64 (L) 03/09/2023    HGB 10.6 (L) 03/09/2023    HCT 33.4 (L) 03/09/2023    MCV 91.8 03/09/2023    MCH 29.1 03/09/2023    MCHC 31.7 (L) 03/09/2023    RDW 15.1 (H) 03/09/2023     03/09/2023    MPV 9.5 03/09/2023    SEGSPCT 69.5 (H) 03/09/2023    EOSRELPCT 2.3 03/09/2023    BASOPCT 0.4 03/09/2023    LYMPHOPCT 20.7 (L) 03/09/2023    MONOPCT 7.1 (H) 03/09/2023    SEGSABS 8.6 03/09/2023    EOSABS 0.3 03/09/2023    BASOSABS 0.1 03/09/2023    LYMPHSABS 2.6 03/09/2023    MONOSABS 0.9 03/09/2023    DIFFTYPE AUTOMATED DIFFERENTIAL 03/09/2023     Chemistry:  Lab Results   Component Value Date     02/16/2023    K 3.0 (LL) 02/16/2023    CL 95 (L) 02/16/2023    CO2 28 02/16/2023    BUN 26 (H) 02/16/2023    CREATININE 4.8 (H) 02/16/2023    GLUCOSE 131 (H) 02/16/2023    CALCIUM 8.9 02/16/2023    PROT 5.2 (L) 02/16/2023    LABALBU 3.3 (L) 02/16/2023    BILITOT 0.5 02/16/2023    ALKPHOS 92 02/16/2023    AST 13 (L) 02/16/2023    ALT 7 (L) 02/16/2023    LABGLOM 12 (L) 02/16/2023    GFRAA 22 (L) 09/30/2022    PHOS 3.3 12/28/2022    MG 2.4 12/28/2022     Immunology:  Lab Results   Component Value Date    PROT 5.2 (L) 02/16/2023     Coagulation Panel:  Lab Results   Component Value Date    PROTIME 11.4 (L) 07/19/2022    INR 0.88 07/19/2022    APTT 30.9 07/19/2022    DDIMER 223 04/15/2011     Tumor Markers:  Lab Results   Component Value Date    PSA 6.5 (H) 02/15/2019      Observations:  No data recorded    Assessment & Plan:  1. 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 He had wide excision with sentinel lymph node biopsy in June 2021. He has follow-up with Dr Brisa Cramer I recommend to use sunscreen. 2.  Encouraged him to follow-up with urologist for history of elevated PSA and prostate biopsy. Reportedly there was no prostate cancer. He denied any symptoms to suggest prostate cancer. 3. CT chest in 9/2022 reviewed. He quit smoking in 1995. He was diagnosed with metastatic melanoma. I offered [employer] ([place] I scheduled for chemo education. We discussed about prognosis and potential SE of [facility] ([place]). BRAF sent however insufficient tissue and no other block to send for testing. Follow up imaging study to see response in 3 months. 12/1/2023 C1 keytruda every 3 weeks. [place] 2022 he had COVID PNA. Since then he has been having intermittent loose stool, no watery stool. He canceled colonoscopy and I remind him to reschedule the colonoscopy. 3/9/2023 C3 keytruda. 3/9/2023 WBC 12.4. Hg 10. 6. plat 260. Will monitor TSH. I recommend to eat frequent meals. He will f/u with urologist.  CT chest in 4/2023. I advise to keep age appropriate cancer screening up-to-date. RTC in 2 - 3 weeks. All of his questions have been answered for today.

## 2023-03-11 LAB — T4 SERPL-MCNC: 8.62 UG/DL (ref 4.5–11.7)

## 2023-03-22 DIAGNOSIS — E44.0 MODERATE MALNUTRITION (HCC): ICD-10-CM

## 2023-03-22 DIAGNOSIS — C43.61 MALIGNANT MELANOMA OF RIGHT UPPER EXTREMITY INCLUDING SHOULDER (HCC): Primary | ICD-10-CM

## 2023-03-22 DIAGNOSIS — R91.8 LUNG MASS: ICD-10-CM

## 2023-03-30 ENCOUNTER — HOSPITAL ENCOUNTER (OUTPATIENT)
Dept: INFUSION THERAPY | Age: 78
Discharge: HOME | End: 2023-03-30
Payer: MEDICARE

## 2023-03-30 VITALS
HEIGHT: 68 IN | HEART RATE: 70 BPM | WEIGHT: 119.8 LBS | OXYGEN SATURATION: 98 % | DIASTOLIC BLOOD PRESSURE: 59 MMHG | BODY MASS INDEX: 18.16 KG/M2 | SYSTOLIC BLOOD PRESSURE: 107 MMHG

## 2023-03-30 DIAGNOSIS — C43.61 MALIGNANT MELANOMA OF RIGHT UPPER EXTREMITY INCLUDING SHOULDER (HCC): Primary | ICD-10-CM

## 2023-03-30 DIAGNOSIS — E44.0 MODERATE MALNUTRITION (HCC): ICD-10-CM

## 2023-03-30 LAB
ALBUMIN SERPL-MCNC: 3.2 GM/DL (ref 3.4–5)
ALP BLD-CCNC: 114 IU/L (ref 40–128)
ALT SERPL-CCNC: 5 U/L (ref 10–40)
ANION GAP SERPL CALCULATED.3IONS-SCNC: 10 MMOL/L (ref 4–16)
AST SERPL-CCNC: 14 IU/L (ref 15–37)
BASOPHILS ABSOLUTE: 0 K/CU MM
BASOPHILS RELATIVE PERCENT: 0.2 % (ref 0–1)
BILIRUB SERPL-MCNC: 0.5 MG/DL (ref 0–1)
BUN SERPL-MCNC: 27 MG/DL (ref 6–23)
CALCIUM SERPL-MCNC: 8.8 MG/DL (ref 8.3–10.6)
CHLORIDE BLD-SCNC: 99 MMOL/L (ref 99–110)
CO2: 28 MMOL/L (ref 21–32)
CREAT SERPL-MCNC: 4.4 MG/DL (ref 0.9–1.3)
DIFFERENTIAL TYPE: ABNORMAL
EOSINOPHILS ABSOLUTE: 0.2 K/CU MM
EOSINOPHILS RELATIVE PERCENT: 1.1 % (ref 0–3)
GFR SERPL CREATININE-BSD FRML MDRD: 13 ML/MIN/1.73M2
GLUCOSE SERPL-MCNC: 135 MG/DL (ref 70–99)
HCT VFR BLD CALC: 34.6 % (ref 42–52)
HEMOGLOBIN: 11.1 GM/DL (ref 13.5–18)
LYMPHOCYTES ABSOLUTE: 2.6 K/CU MM
LYMPHOCYTES RELATIVE PERCENT: 18.7 % (ref 24–44)
MCH RBC QN AUTO: 28.9 PG (ref 27–31)
MCHC RBC AUTO-ENTMCNC: 32.1 % (ref 32–36)
MCV RBC AUTO: 90.1 FL (ref 78–100)
MONOCYTES ABSOLUTE: 1.1 K/CU MM
MONOCYTES RELATIVE PERCENT: 7.8 % (ref 0–4)
PDW BLD-RTO: 14.9 % (ref 11.7–14.9)
PLATELET # BLD: 238 K/CU MM (ref 140–440)
PMV BLD AUTO: 9.6 FL (ref 7.5–11.1)
POTASSIUM SERPL-SCNC: 3.2 MMOL/L (ref 3.5–5.1)
RBC # BLD: 3.84 M/CU MM (ref 4.6–6.2)
SEGMENTED NEUTROPHILS ABSOLUTE COUNT: 10.2 K/CU MM
SEGMENTED NEUTROPHILS RELATIVE PERCENT: 72.2 % (ref 36–66)
SODIUM BLD-SCNC: 137 MMOL/L (ref 135–145)
TOTAL PROTEIN: 5.5 GM/DL (ref 6.4–8.2)
TSH SERPL DL<=0.005 MIU/L-ACNC: 1.53 UIU/ML (ref 0.27–4.2)
WBC # BLD: 14.1 K/CU MM (ref 4–10.5)

## 2023-03-30 PROCEDURE — 87324 CLOSTRIDIUM AG IA: CPT

## 2023-03-30 PROCEDURE — 87449 NOS EACH ORGANISM AG IA: CPT

## 2023-03-30 PROCEDURE — 80053 COMPREHEN METABOLIC PANEL: CPT

## 2023-03-30 PROCEDURE — 2580000003 HC RX 258: Performed by: PHYSICIAN ASSISTANT

## 2023-03-30 PROCEDURE — 85025 COMPLETE CBC W/AUTO DIFF WBC: CPT

## 2023-03-30 PROCEDURE — 96360 HYDRATION IV INFUSION INIT: CPT

## 2023-03-30 PROCEDURE — 84443 ASSAY THYROID STIM HORMONE: CPT

## 2023-03-30 RX ORDER — SODIUM CHLORIDE 0.9 % (FLUSH) 0.9 %
5-40 SYRINGE (ML) INJECTION PRN
Status: CANCELLED | OUTPATIENT
Start: 2023-04-06

## 2023-03-30 RX ORDER — EPINEPHRINE 1 MG/ML
0.3 INJECTION, SOLUTION, CONCENTRATE INTRAVENOUS PRN
Status: CANCELLED | OUTPATIENT
Start: 2023-04-06

## 2023-03-30 RX ORDER — 0.9 % SODIUM CHLORIDE 0.9 %
500 INTRAVENOUS SOLUTION INTRAVENOUS ONCE
Status: COMPLETED | OUTPATIENT
Start: 2023-03-30 | End: 2023-03-30

## 2023-03-30 RX ORDER — ONDANSETRON 2 MG/ML
8 INJECTION INTRAMUSCULAR; INTRAVENOUS
Status: CANCELLED | OUTPATIENT
Start: 2023-04-06

## 2023-03-30 RX ORDER — SODIUM CHLORIDE 9 MG/ML
5-250 INJECTION, SOLUTION INTRAVENOUS PRN
Status: CANCELLED | OUTPATIENT
Start: 2023-04-06

## 2023-03-30 RX ORDER — HEPARIN SODIUM (PORCINE) LOCK FLUSH IV SOLN 100 UNIT/ML 100 UNIT/ML
500 SOLUTION INTRAVENOUS PRN
Status: CANCELLED | OUTPATIENT
Start: 2023-04-06

## 2023-03-30 RX ORDER — MEPERIDINE HYDROCHLORIDE 25 MG/ML
12.5 INJECTION INTRAMUSCULAR; INTRAVENOUS; SUBCUTANEOUS PRN
Status: CANCELLED | OUTPATIENT
Start: 2023-04-06

## 2023-03-30 RX ORDER — SODIUM CHLORIDE 9 MG/ML
INJECTION, SOLUTION INTRAVENOUS CONTINUOUS
Status: CANCELLED | OUTPATIENT
Start: 2023-04-06

## 2023-03-30 RX ORDER — ACETAMINOPHEN 325 MG/1
650 TABLET ORAL
Status: CANCELLED | OUTPATIENT
Start: 2023-04-06

## 2023-03-30 RX ORDER — FAMOTIDINE 10 MG/ML
20 INJECTION, SOLUTION INTRAVENOUS
Status: CANCELLED | OUTPATIENT
Start: 2023-04-06

## 2023-03-30 RX ORDER — ALBUTEROL SULFATE 90 UG/1
4 AEROSOL, METERED RESPIRATORY (INHALATION) PRN
Status: CANCELLED | OUTPATIENT
Start: 2023-04-06

## 2023-03-30 RX ORDER — DIPHENHYDRAMINE HYDROCHLORIDE 50 MG/ML
50 INJECTION INTRAMUSCULAR; INTRAVENOUS
Status: CANCELLED | OUTPATIENT
Start: 2023-04-06

## 2023-03-30 RX ADMIN — SODIUM CHLORIDE 500 ML: 9 INJECTION, SOLUTION INTRAVENOUS at 11:35

## 2023-03-30 NOTE — PROGRESS NOTES
Ambulated to infusion area, here today for chemotherapy and OV with ALONDRA Azul. Patient reports he has diarrhea, abdominal discomfort and note eating well. Reports he is not presently taking anything for diarrhea, and admits to only occasionally drinking protein shakes. Weight down 4 more pounds since 3/9. PIV placed in right forearm. Labs drawn. Labs within defined limits. Discussed concerns with Isha Juarez, will delay treatment x1 week, give 500 bolus of NS, and order a CT and stool specimen. Patient updated and verbalized understanding. 500mL administered as ordered. Call light within reach. Tolerated infusion without incident. Discharge instructions provided. RTC 04/06 for next infusion.

## 2023-03-31 ENCOUNTER — TELEPHONE (OUTPATIENT)
Dept: ONCOLOGY | Age: 78
End: 2023-03-31

## 2023-03-31 DIAGNOSIS — A04.72 C. DIFFICILE DIARRHEA: Primary | ICD-10-CM

## 2023-03-31 LAB
C DIFF AG + TOXIN: ABNORMAL
CLOSTRIDIUM DIFFICILE, PCR: ABNORMAL
SOURCE: ABNORMAL

## 2023-03-31 RX ORDER — METRONIDAZOLE 500 MG/1
500 TABLET ORAL 3 TIMES DAILY
Qty: 21 TABLET | Refills: 0 | Status: SHIPPED | OUTPATIENT
Start: 2023-03-31 | End: 2023-04-07

## 2023-03-31 NOTE — TELEPHONE ENCOUNTER
RX for flagyl 500 mg TID x7 days e-scribed to [address] per physician order d/t positive C. Diff results. Called patient @ [phone] to notify. Medication instructions provided. Voices understanding. No further needs addressed at this time.

## 2023-04-03 ENCOUNTER — HOSPITAL ENCOUNTER (OUTPATIENT)
Age: 78
Discharge: HOME | End: 2023-04-03
Payer: MEDICARE

## 2023-04-03 LAB — PROSTATE SPECIFIC ANTIGEN: 6.32 NG/ML (ref 0–4)

## 2023-04-03 PROCEDURE — 36415 COLL VENOUS BLD VENIPUNCTURE: CPT

## 2023-04-03 PROCEDURE — G0103 PSA SCREENING: HCPCS

## 2023-04-06 ENCOUNTER — HOSPITAL ENCOUNTER (OUTPATIENT)
Dept: INFUSION THERAPY | Age: 78
Discharge: HOME | End: 2023-04-06
Payer: MEDICARE

## 2023-04-06 VITALS
TEMPERATURE: 98.2 F | WEIGHT: 125.2 LBS | HEART RATE: 93 BPM | SYSTOLIC BLOOD PRESSURE: 135 MMHG | HEIGHT: 68 IN | BODY MASS INDEX: 18.97 KG/M2 | DIASTOLIC BLOOD PRESSURE: 71 MMHG | OXYGEN SATURATION: 93 %

## 2023-04-06 DIAGNOSIS — C43.61 MALIGNANT MELANOMA OF RIGHT UPPER EXTREMITY INCLUDING SHOULDER (HCC): Primary | ICD-10-CM

## 2023-04-06 LAB
ALBUMIN SERPL-MCNC: 3 GM/DL (ref 3.4–5)
ALP BLD-CCNC: 84 IU/L (ref 40–128)
ALT SERPL-CCNC: 7 U/L (ref 10–40)
ANION GAP SERPL CALCULATED.3IONS-SCNC: 8 MMOL/L (ref 4–16)
AST SERPL-CCNC: 20 IU/L (ref 15–37)
BASOPHILS ABSOLUTE: 0 K/CU MM
BASOPHILS RELATIVE PERCENT: 0.2 % (ref 0–1)
BILIRUB SERPL-MCNC: 0.3 MG/DL (ref 0–1)
BUN SERPL-MCNC: 23 MG/DL (ref 6–23)
CALCIUM SERPL-MCNC: 8.1 MG/DL (ref 8.3–10.6)
CHLORIDE BLD-SCNC: 100 MMOL/L (ref 99–110)
CO2: 30 MMOL/L (ref 21–32)
CREAT SERPL-MCNC: 3.9 MG/DL (ref 0.9–1.3)
DIFFERENTIAL TYPE: ABNORMAL
EOSINOPHILS ABSOLUTE: 0.2 K/CU MM
EOSINOPHILS RELATIVE PERCENT: 1.6 % (ref 0–3)
GFR SERPL CREATININE-BSD FRML MDRD: 15 ML/MIN/1.73M2
GLUCOSE SERPL-MCNC: 90 MG/DL (ref 70–99)
HCT VFR BLD CALC: 31.5 % (ref 42–52)
HEMOGLOBIN: 10.2 GM/DL (ref 13.5–18)
LYMPHOCYTES ABSOLUTE: 3 K/CU MM
LYMPHOCYTES RELATIVE PERCENT: 21.6 % (ref 24–44)
MCH RBC QN AUTO: 28.7 PG (ref 27–31)
MCHC RBC AUTO-ENTMCNC: 32.4 % (ref 32–36)
MCV RBC AUTO: 88.7 FL (ref 78–100)
MONOCYTES ABSOLUTE: 1 K/CU MM
MONOCYTES RELATIVE PERCENT: 7.4 % (ref 0–4)
PDW BLD-RTO: 15 % (ref 11.7–14.9)
PLATELET # BLD: 213 K/CU MM (ref 140–440)
PMV BLD AUTO: 9.8 FL (ref 7.5–11.1)
POTASSIUM SERPL-SCNC: 3.2 MMOL/L (ref 3.5–5.1)
RBC # BLD: 3.55 M/CU MM (ref 4.6–6.2)
SEGMENTED NEUTROPHILS ABSOLUTE COUNT: 9.7 K/CU MM
SEGMENTED NEUTROPHILS RELATIVE PERCENT: 69.2 % (ref 36–66)
SODIUM BLD-SCNC: 138 MMOL/L (ref 135–145)
TOTAL PROTEIN: 5 GM/DL (ref 6.4–8.2)
WBC # BLD: 13.9 K/CU MM (ref 4–10.5)

## 2023-04-06 PROCEDURE — 80053 COMPREHEN METABOLIC PANEL: CPT

## 2023-04-06 PROCEDURE — 2580000003 HC RX 258: Performed by: NURSE PRACTITIONER

## 2023-04-06 PROCEDURE — 6360000002 HC RX W HCPCS: Performed by: NURSE PRACTITIONER

## 2023-04-06 PROCEDURE — 85025 COMPLETE CBC W/AUTO DIFF WBC: CPT

## 2023-04-06 PROCEDURE — 96413 CHEMO IV INFUSION 1 HR: CPT

## 2023-04-06 RX ORDER — SODIUM CHLORIDE 9 MG/ML
5-250 INJECTION, SOLUTION INTRAVENOUS PRN
Status: DISCONTINUED | OUTPATIENT
Start: 2023-04-06 | End: 2023-04-07 | Stop reason: HOSPADM

## 2023-04-06 RX ADMIN — SODIUM CHLORIDE 200 MG: 9 INJECTION, SOLUTION INTRAVENOUS at 10:51

## 2023-04-06 RX ADMIN — SODIUM CHLORIDE 20 ML/HR: 9 INJECTION, SOLUTION INTRAVENOUS at 10:50

## 2023-04-06 NOTE — PROGRESS NOTES
Pt here for PIV access, blood draw and chemotherapy, Currently has no issues or questions for physician. PIV started in RT anterior forearm. Blood drawn and sent to lab for processing. Treatment authorized and administered as ordered Pt tolerated well and left treatment area ambulatory, and in stable condition. Copy of AVS provided     Treatment orders and medication sequencing (when applicable) was verified by 2 registered nurses. The treatment plan was confirmed with the patient prior to administration, and the patient understands the need to report any treatment-related symptoms. Prior to administration, when applicable, the following 8 elements of medication administration were reviewed with 2nd Registered Nurse prior to dosing: drug name, drug dose, infusion volume when prepared in a syringe, rate of administration, expiration dates and/or times, appearance and integrity of drug(s), and rate of pump for infusion.   The 5 rights of medication administration have been verified

## 2023-04-14 ENCOUNTER — HOSPITAL ENCOUNTER (OUTPATIENT)
Dept: INFUSION THERAPY | Age: 78
Discharge: HOME | End: 2023-04-14
Payer: MEDICARE

## 2023-04-14 PROCEDURE — 99211 OFF/OP EST MAY X REQ PHY/QHP: CPT

## 2023-04-17 ENCOUNTER — HOSPITAL ENCOUNTER (OUTPATIENT)
Dept: RADIATION ONCOLOGY | Age: 78
Discharge: HOME | End: 2023-04-17
Payer: MEDICARE

## 2023-04-17 VITALS
DIASTOLIC BLOOD PRESSURE: 68 MMHG | HEART RATE: 89 BPM | WEIGHT: 127 LBS | OXYGEN SATURATION: 99 % | SYSTOLIC BLOOD PRESSURE: 126 MMHG | BODY MASS INDEX: 19.25 KG/M2 | TEMPERATURE: 97.4 F | HEIGHT: 68 IN

## 2023-04-17 PROCEDURE — 99211 OFF/OP EST MAY X REQ PHY/QHP: CPT

## 2023-04-17 PROCEDURE — 99205 OFFICE O/P NEW HI 60 MIN: CPT | Performed by: RADIOLOGY

## 2023-04-17 NOTE — CONSULTS
immunotherapy. He denies any new shortness of breath, chest pain, cough, hemoptysis, new lumps or bumps, or involuntary weight loss. He has not had radiation therapy before. He does not have a cardiac implanted device.       Past Medical History:   Diagnosis Date    Abnormal urine     abn urine culture 4/3/2017- OR for 4/13/2017 cancelled-rescheduled for 4/25/2017- ( on 4/24/2017 pt states finishes antibiotic today and had repeat urine culture at office last week)    Chronic kidney disease, stage 4, severely decreased GFR ([address] 09/2016    Dialysis on Mon-Wed-Fri    follows with Dr Aura Mathews    Nunze catheter in place     Has had in place since 9/29/16    Hearing deficit     both ears --no hearing aids    Hemodialysis patient [facility])     Sees Dr Aura Mathews    Dialysis on Mon-Wed-Fri    History of blood transfusion     Hypertension     Follows with Dr. Aura Mathews    Inguinal hernia     Missing teeth, acquired     upper and lower  some broken off now    Wears glasses     for reading        Past Surgical History:   Procedure Laterality Date    ABDOMEN SURGERY Right 6/22/2021    RIGHT BACK EXCISION 5.8 CM MELANOMA (3.1 MM DEPTH) WITH LOCAL FLAP performed by Allyssa Casey MD at [address] [place] [address] N/A 6/22/2021    SENTINEL LYMPHNODE BIOPSY RIGHT AXILLA performed by Radha Biggs MD at [address] [place] [address] Left 11/29/2016    Left upper arm    HERNIA REPAIR Right 11/29/2016    INGUINAL HERNIA    PROSTATE SURGERY  04/2017       Family History   Problem Relation Age of Onset    Diabetes Mother     Diabetes Brother     Heart Disease Brother        Social History     Socioeconomic History    Marital status: Single     Spouse name: Not on file    Number of children: Not on file    Years of education: Not on file    Highest education level: Not on file   Occupational History    Not on file   Tobacco Use    Smoking status: Former     Packs/day: 2.00     Years: 30.00     Pack years: 60.00     Types: Cigarettes

## 2023-04-17 NOTE — PROGRESS NOTES
Pt scheduled for PET Scan  on May 4, 2023 at 8:30 AM, to arrive at [facility] 8:00 AM. Pt unable to go M.W,F due to dialysis. Prep and instructions explained to pt, written copy given. Pt to return May 9, 2023 at 8:30 AM to [facility] to discuss results and plan of care with Dr. Yao Coyle and possible CT/SIM for radiation planning to follow. Pt and family voices understanding. Direct contact info given, advised to call with any questions or concerns.
FISTULA CREATION Left 11/29/2016    Left upper arm    HERNIA REPAIR Right 11/29/2016    INGUINAL HERNIA    PROSTATE SURGERY  04/2017       No Known Allergies       Current Outpatient Medications:     ondansetron (ZOFRAN) 4 MG tablet, Take 1 tablet by mouth 2 times daily, Disp: , Rfl:     Amoxicillin 500 MG TABS, , Disp: , Rfl:     ondansetron (ZOFRAN) 8 MG tablet, Take 1 tablet by mouth every 8 hours as needed for Nausea or Vomiting, Disp: 90 tablet, Rfl: 3    pantoprazole (PROTONIX) 40 MG tablet, , Disp: , Rfl:     carvedilol (COREG) 3.125 MG tablet, Take 1 tablet by mouth daily, Disp: 180 tablet, Rfl: 3    B Complex-C-Folic Acid (SHEA-ABHISHEK) TABS, Take 1 tablet by mouth daily, Disp: , Rfl:     RENVELA 800 MG tablet, 2 Tabs  PO TID WITH MEALS (Patient not taking: Reported on 4/17/2023), Disp: , Rfl: 2    nitroGLYCERIN (NITRODUR) 0.2 MG/HR, Place 1 patch onto the skin every other day, Disp: , Rfl: 0    isosorbide mononitrate (IMDUR) 30 MG extended release tablet, 1 tablet daily, Disp: , Rfl: 0        Electronically signed by Torrie Escalante on 4/17/2023 at 11:05 AM

## 2023-04-18 ENCOUNTER — TELEPHONE (OUTPATIENT)
Dept: GASTROENTEROLOGY | Age: 78
End: 2023-04-18

## 2023-04-18 DIAGNOSIS — K63.89 COLONIC MASS: Primary | ICD-10-CM

## 2023-04-18 DIAGNOSIS — E44.0 MODERATE MALNUTRITION (HCC): ICD-10-CM

## 2023-04-18 NOTE — TELEPHONE ENCOUNTER
Called pt. In regards to a referral for a colonic mass. Pt was melonie for procedure in Jan of 2023 but got Covid and had to cancel. I r/s procedure for 5/18/23 w/ dr. Quynh Watson. Pt. Stated he would be starting radiation soon and may need to call and r/s. He stated he still has the colyte prep from when he was previously scheduled and would not need a new script. I will mail new instructions.

## 2023-04-19 ENCOUNTER — TELEPHONE (OUTPATIENT)
Dept: GASTROENTEROLOGY | Age: 78
End: 2023-04-19

## 2023-04-27 ENCOUNTER — HOSPITAL ENCOUNTER (OUTPATIENT)
Dept: INFUSION THERAPY | Age: 78
Discharge: HOME | End: 2023-04-27
Payer: MEDICARE

## 2023-04-27 VITALS
HEIGHT: 68 IN | BODY MASS INDEX: 18.28 KG/M2 | DIASTOLIC BLOOD PRESSURE: 64 MMHG | SYSTOLIC BLOOD PRESSURE: 130 MMHG | WEIGHT: 120.6 LBS | OXYGEN SATURATION: 97 % | HEART RATE: 72 BPM | TEMPERATURE: 97.2 F

## 2023-04-27 DIAGNOSIS — E44.0 MODERATE MALNUTRITION (HCC): ICD-10-CM

## 2023-04-27 DIAGNOSIS — K63.89 COLONIC MASS: ICD-10-CM

## 2023-04-27 DIAGNOSIS — C43.61 MALIGNANT MELANOMA OF RIGHT UPPER EXTREMITY INCLUDING SHOULDER (HCC): Primary | ICD-10-CM

## 2023-04-27 LAB
ALBUMIN SERPL-MCNC: 3.2 GM/DL (ref 3.4–5)
ALP BLD-CCNC: 129 IU/L (ref 40–128)
ALT SERPL-CCNC: 5 U/L (ref 10–40)
ANION GAP SERPL CALCULATED.3IONS-SCNC: 12 MMOL/L (ref 4–16)
AST SERPL-CCNC: 18 IU/L (ref 15–37)
BASOPHILS ABSOLUTE: 0.1 K/CU MM
BASOPHILS RELATIVE PERCENT: 0.3 % (ref 0–1)
BILIRUB SERPL-MCNC: 0.7 MG/DL (ref 0–1)
BUN SERPL-MCNC: 16 MG/DL (ref 6–23)
CALCIUM SERPL-MCNC: 8.7 MG/DL (ref 8.3–10.6)
CHLORIDE BLD-SCNC: 99 MMOL/L (ref 99–110)
CO2: 26 MMOL/L (ref 21–32)
CREAT SERPL-MCNC: 4.1 MG/DL (ref 0.9–1.3)
DIFFERENTIAL TYPE: ABNORMAL
EOSINOPHILS ABSOLUTE: 0.1 K/CU MM
EOSINOPHILS RELATIVE PERCENT: 0.8 % (ref 0–3)
GFR SERPL CREATININE-BSD FRML MDRD: 14 ML/MIN/1.73M2
GLUCOSE SERPL-MCNC: 89 MG/DL (ref 70–99)
HCT VFR BLD CALC: 36.5 % (ref 42–52)
HEMOGLOBIN: 11.4 GM/DL (ref 13.5–18)
LYMPHOCYTES ABSOLUTE: 2.6 K/CU MM
LYMPHOCYTES RELATIVE PERCENT: 14.4 % (ref 24–44)
MCH RBC QN AUTO: 29.2 PG (ref 27–31)
MCHC RBC AUTO-ENTMCNC: 31.2 % (ref 32–36)
MCV RBC AUTO: 93.4 FL (ref 78–100)
MONOCYTES ABSOLUTE: 1.1 K/CU MM
MONOCYTES RELATIVE PERCENT: 6 % (ref 0–4)
PDW BLD-RTO: 16.9 % (ref 11.7–14.9)
PLATELET # BLD: 279 K/CU MM (ref 140–440)
PMV BLD AUTO: 9.7 FL (ref 7.5–11.1)
POTASSIUM SERPL-SCNC: 4 MMOL/L (ref 3.5–5.1)
RBC # BLD: 3.91 M/CU MM (ref 4.6–6.2)
SEGMENTED NEUTROPHILS ABSOLUTE COUNT: 14 K/CU MM
SEGMENTED NEUTROPHILS RELATIVE PERCENT: 78.5 % (ref 36–66)
SODIUM BLD-SCNC: 137 MMOL/L (ref 135–145)
TOTAL PROTEIN: 5.7 GM/DL (ref 6.4–8.2)
TSH SERPL DL<=0.005 MIU/L-ACNC: 3.48 UIU/ML (ref 0.27–4.2)
WBC # BLD: 17.9 K/CU MM (ref 4–10.5)

## 2023-04-27 PROCEDURE — 2580000003 HC RX 258: Performed by: NURSE PRACTITIONER

## 2023-04-27 PROCEDURE — 6360000002 HC RX W HCPCS: Performed by: NURSE PRACTITIONER

## 2023-04-27 PROCEDURE — 80053 COMPREHEN METABOLIC PANEL: CPT

## 2023-04-27 PROCEDURE — 85025 COMPLETE CBC W/AUTO DIFF WBC: CPT

## 2023-04-27 PROCEDURE — 96413 CHEMO IV INFUSION 1 HR: CPT

## 2023-04-27 PROCEDURE — 84443 ASSAY THYROID STIM HORMONE: CPT

## 2023-04-27 RX ORDER — SODIUM CHLORIDE 9 MG/ML
5-250 INJECTION, SOLUTION INTRAVENOUS PRN
Status: CANCELLED | OUTPATIENT
Start: 2023-04-27

## 2023-04-27 RX ORDER — ONDANSETRON 2 MG/ML
8 INJECTION INTRAMUSCULAR; INTRAVENOUS
Status: CANCELLED | OUTPATIENT
Start: 2023-04-27

## 2023-04-27 RX ORDER — SODIUM CHLORIDE 9 MG/ML
5-250 INJECTION, SOLUTION INTRAVENOUS PRN
Status: DISCONTINUED | OUTPATIENT
Start: 2023-04-27 | End: 2023-04-28 | Stop reason: HOSPADM

## 2023-04-27 RX ORDER — SODIUM CHLORIDE 0.9 % (FLUSH) 0.9 %
5-40 SYRINGE (ML) INJECTION PRN
Status: CANCELLED | OUTPATIENT
Start: 2023-04-27

## 2023-04-27 RX ORDER — MEPERIDINE HYDROCHLORIDE 25 MG/ML
12.5 INJECTION INTRAMUSCULAR; INTRAVENOUS; SUBCUTANEOUS PRN
Status: CANCELLED | OUTPATIENT
Start: 2023-04-27

## 2023-04-27 RX ORDER — SODIUM CHLORIDE 9 MG/ML
INJECTION, SOLUTION INTRAVENOUS CONTINUOUS
Status: CANCELLED | OUTPATIENT
Start: 2023-04-27

## 2023-04-27 RX ORDER — ACETAMINOPHEN 325 MG/1
650 TABLET ORAL
Status: CANCELLED | OUTPATIENT
Start: 2023-04-27

## 2023-04-27 RX ORDER — ALBUTEROL SULFATE 90 UG/1
4 AEROSOL, METERED RESPIRATORY (INHALATION) PRN
Status: CANCELLED | OUTPATIENT
Start: 2023-04-27

## 2023-04-27 RX ORDER — HEPARIN SODIUM (PORCINE) LOCK FLUSH IV SOLN 100 UNIT/ML 100 UNIT/ML
500 SOLUTION INTRAVENOUS PRN
Status: CANCELLED | OUTPATIENT
Start: 2023-04-27

## 2023-04-27 RX ORDER — EPINEPHRINE 1 MG/ML
0.3 INJECTION, SOLUTION, CONCENTRATE INTRAVENOUS PRN
Status: CANCELLED | OUTPATIENT
Start: 2023-04-27

## 2023-04-27 RX ORDER — FAMOTIDINE 10 MG/ML
20 INJECTION, SOLUTION INTRAVENOUS
Status: CANCELLED | OUTPATIENT
Start: 2023-04-27

## 2023-04-27 RX ORDER — DIPHENHYDRAMINE HYDROCHLORIDE 50 MG/ML
50 INJECTION INTRAMUSCULAR; INTRAVENOUS
Status: CANCELLED | OUTPATIENT
Start: 2023-04-27

## 2023-04-27 RX ADMIN — SODIUM CHLORIDE 20 ML/HR: 9 INJECTION, SOLUTION INTRAVENOUS at 10:30

## 2023-04-27 RX ADMIN — SODIUM CHLORIDE 200 MG: 9 INJECTION, SOLUTION INTRAVENOUS at 10:31

## 2023-04-27 NOTE — PROGRESS NOTES
Pt here for Keytruda infusion. PIV placed with blood return noted. Labs sent. Pt has no new concerns at this time. Pt states he has had ongoing issues which started in December of 22 which the physician is aware of intermittent constipation and diarrhea. Pt states he does take medication for this at home. Pt's weight down since last tx. Pt states eating and drinking without difficulty. Pt states sometimes he does not have an appetite. Pt educated on eating small frequent meals and to drink ensure or any protein shake at home if he does not feel like eating. Pt states he does have protein shakes at home and pt verbalized understanding. Patient's status assessed and documented appropriately. All labs and required results were also reviewed today. Treatment parameters have been reviewed. Today's treatment has been approved by the provider. Treatment orders and medication sequencing (when applicable) was verified by 2 registered nurses. The treatment plan was confirmed with the patient prior to administration, and the patient understands the need to report any treatment-related symptoms. Prior to administration, when applicable, the following 8 elements of medication administration were reviewed with 2nd Registered Nurse prior to dosing: drug name, drug dose, infusion volume when prepared in a syringe, rate of administration, expiration dates and/or times, appearance and integrity of drug(s), and rate of pump for infusion. The 5 rights of medication administration have been verified. Pt tolerated tx without incident.  Left ambulatory discharge instructions given

## 2023-05-01 ENCOUNTER — PREP FOR PROCEDURE (OUTPATIENT)
Dept: GASTROENTEROLOGY | Age: 78
End: 2023-05-01

## 2023-05-01 RX ORDER — SODIUM CHLORIDE 0.9 % (FLUSH) 0.9 %
5-40 SYRINGE (ML) INJECTION EVERY 12 HOURS SCHEDULED
Status: CANCELLED | OUTPATIENT
Start: 2023-05-01

## 2023-05-01 RX ORDER — SODIUM CHLORIDE 9 MG/ML
25 INJECTION, SOLUTION INTRAVENOUS PRN
Status: CANCELLED | OUTPATIENT
Start: 2023-05-01

## 2023-05-01 RX ORDER — SODIUM CHLORIDE 0.9 % (FLUSH) 0.9 %
5-40 SYRINGE (ML) INJECTION PRN
Status: CANCELLED | OUTPATIENT
Start: 2023-05-01

## 2023-05-01 RX ORDER — SODIUM CHLORIDE, SODIUM LACTATE, POTASSIUM CHLORIDE, CALCIUM CHLORIDE 600; 310; 30; 20 MG/100ML; MG/100ML; MG/100ML; MG/100ML
INJECTION, SOLUTION INTRAVENOUS CONTINUOUS
Status: CANCELLED | OUTPATIENT
Start: 2023-05-01

## 2023-05-03 DIAGNOSIS — C78.01 SECONDARY MALIGNANT MELANOMA OF RIGHT LUNG (HCC): ICD-10-CM

## 2023-05-03 DIAGNOSIS — C43.61 MALIGNANT MELANOMA OF RIGHT UPPER EXTREMITY INCLUDING SHOULDER (HCC): Primary | ICD-10-CM

## 2023-05-11 ENCOUNTER — HOSPITAL ENCOUNTER (OUTPATIENT)
Dept: INFUSION THERAPY | Age: 78
Discharge: HOME | End: 2023-05-11
Payer: MEDICARE

## 2023-05-11 ENCOUNTER — OFFICE VISIT (OUTPATIENT)
Dept: ONCOLOGY | Age: 78
End: 2023-05-11
Payer: MEDICARE

## 2023-05-11 VITALS
BODY MASS INDEX: 17.49 KG/M2 | OXYGEN SATURATION: 99 % | TEMPERATURE: 97.9 F | SYSTOLIC BLOOD PRESSURE: 101 MMHG | HEART RATE: 92 BPM | HEIGHT: 68 IN | WEIGHT: 115.4 LBS | DIASTOLIC BLOOD PRESSURE: 63 MMHG

## 2023-05-11 DIAGNOSIS — C43.61 MALIGNANT MELANOMA OF RIGHT UPPER EXTREMITY INCLUDING SHOULDER (HCC): Primary | ICD-10-CM

## 2023-05-11 PROCEDURE — 99211 OFF/OP EST MAY X REQ PHY/QHP: CPT

## 2023-05-11 PROCEDURE — 99213 OFFICE O/P EST LOW 20 MIN: CPT | Performed by: INTERNAL MEDICINE

## 2023-05-11 PROCEDURE — 1124F ACP DISCUSS-NO DSCNMKR DOCD: CPT | Performed by: INTERNAL MEDICINE

## 2023-05-11 RX ORDER — MEGESTROL ACETATE 40 MG/1
40 TABLET ORAL 2 TIMES DAILY
COMMUNITY
Start: 2023-05-08

## 2023-05-11 RX ORDER — VIT B COMP NO.3/FOLIC/C/BIOTIN 1 MG-60 MG
1 TABLET ORAL DAILY
COMMUNITY
Start: 2023-04-21

## 2023-05-11 ASSESSMENT — PATIENT HEALTH QUESTIONNAIRE - PHQ9
SUM OF ALL RESPONSES TO PHQ9 QUESTIONS 1 & 2: 0
SUM OF ALL RESPONSES TO PHQ QUESTIONS 1-9: 0
SUM OF ALL RESPONSES TO PHQ QUESTIONS 1-9: 0
1. LITTLE INTEREST OR PLEASURE IN DOING THINGS: 0
2. FEELING DOWN, DEPRESSED OR HOPELESS: 0
SUM OF ALL RESPONSES TO PHQ QUESTIONS 1-9: 0
SUM OF ALL RESPONSES TO PHQ QUESTIONS 1-9: 0

## 2023-05-11 NOTE — PROGRESS NOTES
MA Rooming Questions  Patient: Davy Soliz  MRN: [id number]    Date: 5/11/2023        1. Do you have any new issues? yes - abdominal pain that comes and goes          2. Do you need any refills on medications?    no    3. Have you had any imaging done since your last visit?   no    4. Have you been hospitalized or seen in the emergency room since your last visit here?   no    5. Did the patient have a depression screening completed today?  Yes    PHQ-9 Total Score: 0 (5/11/2023 11:04 AM)       PHQ-9 Given to (if applicable):               PHQ-9 Score (if applicable):                     [] Positive     []  Negative              Does question #9 need addressed (if applicable)                     [] Yes    []  No               Ania Hoskins, YOSELYN

## 2023-05-12 ENCOUNTER — TELEPHONE (OUTPATIENT)
Dept: RADIATION ONCOLOGY | Age: 78
End: 2023-05-12

## 2023-05-12 NOTE — TELEPHONE ENCOUNTER
Call placed to pt to follow up on PET and radiation planning appt. Pt rescheduled PET Scan for 5/25/2023 and rescheduled follow up with Dr. Oskar Cristina and Martina Ackerman CT/SIM for radiation planning on 5/31/2023 at 1:00 PM. Encouraged pt to let this RN move up appointments to sooner date, pt declined. Pt states he already has multiple appts and that the oncology appointments are scheduled for his first available dates and he cannot move them up at this time. Dr. Oskar Cristina notified.

## 2023-05-15 NOTE — PROGRESS NOTES
Patient will be called with an arrival time on 5817/2023 for his procedure at [facility] on 5/18/2023.               1. Do not eat or drink anything after midnight - unless instructed by your doctor prior to surgery. This includes                   no water, chewing gum or mints. 2. Follow your directions as prescribed by the doctor for your procedure and medications. Morning of surgery take:    3. Check with your Doctor regarding stopping vitamins, supplements, blood thinners and follow their instructions. Stop vitamins, supplements and NSAIDS:    4. Do not smoke, vape or use chewing tobacco morning of surgery. Do not drink any alcoholic beverages 24 hours prior to surgery. This includes NA Beer. No street drugs 7 days prior to surgery. 5. You may brush your teeth and gargle the morning of surgery. DO NOT SWALLOW WATER   6. You MUST make arrangements for a responsible adult to take you home after your surgery and be able to check on you every couple                   hours for the day. You will not be allowed to leave alone or drive yourself home. It is strongly suggested someone stay with you the first 24                   hrs. Your surgery will be cancelled if you do not have a ride home. 7. Please wear simple, loose fitting clothing to the hospital.  Hakan Tena not bring valuables (money, credit cards, checkbooks, etc.) Do not wear any                   makeup (including no eye makeup) or nail polish on your fingers or toes. 8. DO NOT wear any jewelry or piercings on day of surgery. All body piercing jewelry must be removed. 9. If you have dentures, they will be removed before going to the OR; we will provide you a container. If you wear contact lenses or glasses,                  they will be removed; please bring a case for them. 10. If you  have a Living Will and Durable Power of  for Healthcare, please bring in a copy.            11. Please bring

## 2023-05-17 ENCOUNTER — ANESTHESIA EVENT (OUTPATIENT)
Dept: ENDOSCOPY | Age: 78
End: 2023-05-17
Payer: MEDICARE

## 2023-05-17 NOTE — PROGRESS NOTES
Spoke with patient and he will arrive at 1030 at [facility] on 5/18/2023 for his procedure at 1200. I had instructed patient to contact office about his prep instructions because he could not find his, and he did not contact them,or go get them. So he said he will have his daughter take him to get them this afternoon.

## 2023-05-18 ENCOUNTER — HOSPITAL ENCOUNTER (OUTPATIENT)
Age: 78
Setting detail: OUTPATIENT SURGERY
Discharge: HOME | End: 2023-05-18
Attending: INTERNAL MEDICINE | Admitting: INTERNAL MEDICINE
Payer: MEDICARE

## 2023-05-18 ENCOUNTER — ANESTHESIA (OUTPATIENT)
Dept: ENDOSCOPY | Age: 78
End: 2023-05-18
Payer: MEDICARE

## 2023-05-18 VITALS
HEIGHT: 68 IN | HEART RATE: 53 BPM | SYSTOLIC BLOOD PRESSURE: 119 MMHG | WEIGHT: 113 LBS | OXYGEN SATURATION: 100 % | RESPIRATION RATE: 16 BRPM | BODY MASS INDEX: 17.13 KG/M2 | DIASTOLIC BLOOD PRESSURE: 55 MMHG | TEMPERATURE: 97.6 F

## 2023-05-18 PROBLEM — Z86.010 HISTORY OF COLON POLYPS: Status: ACTIVE | Noted: 2023-05-18

## 2023-05-18 PROCEDURE — 6360000002 HC RX W HCPCS

## 2023-05-18 PROCEDURE — 7100000010 HC PHASE II RECOVERY - FIRST 15 MIN: Performed by: INTERNAL MEDICINE

## 2023-05-18 PROCEDURE — 3609027000 HC COLONOSCOPY: Performed by: INTERNAL MEDICINE

## 2023-05-18 PROCEDURE — 2580000003 HC RX 258: Performed by: NURSE PRACTITIONER

## 2023-05-18 PROCEDURE — 2709999900 HC NON-CHARGEABLE SUPPLY: Performed by: INTERNAL MEDICINE

## 2023-05-18 PROCEDURE — 3700000001 HC ADD 15 MINUTES (ANESTHESIA): Performed by: INTERNAL MEDICINE

## 2023-05-18 PROCEDURE — 7100000011 HC PHASE II RECOVERY - ADDTL 15 MIN: Performed by: INTERNAL MEDICINE

## 2023-05-18 PROCEDURE — G0105 COLORECTAL SCRN; HI RISK IND: HCPCS | Performed by: INTERNAL MEDICINE

## 2023-05-18 PROCEDURE — 3700000000 HC ANESTHESIA ATTENDED CARE: Performed by: INTERNAL MEDICINE

## 2023-05-18 RX ORDER — PHENYLEPHRINE HYDROCHLORIDE 10 MG/ML
INJECTION INTRAVENOUS PRN
Status: DISCONTINUED | OUTPATIENT
Start: 2023-05-18 | End: 2023-05-18 | Stop reason: SDUPTHER

## 2023-05-18 RX ORDER — SODIUM CHLORIDE, SODIUM LACTATE, POTASSIUM CHLORIDE, CALCIUM CHLORIDE 600; 310; 30; 20 MG/100ML; MG/100ML; MG/100ML; MG/100ML
INJECTION, SOLUTION INTRAVENOUS CONTINUOUS
Status: DISCONTINUED | OUTPATIENT
Start: 2023-05-18 | End: 2023-05-18 | Stop reason: HOSPADM

## 2023-05-18 RX ORDER — LIDOCAINE HYDROCHLORIDE 20 MG/ML
INJECTION, SOLUTION INTRAVENOUS PRN
Status: DISCONTINUED | OUTPATIENT
Start: 2023-05-18 | End: 2023-05-18 | Stop reason: SDUPTHER

## 2023-05-18 RX ORDER — SODIUM CHLORIDE 9 MG/ML
25 INJECTION, SOLUTION INTRAVENOUS PRN
Status: DISCONTINUED | OUTPATIENT
Start: 2023-05-18 | End: 2023-05-18 | Stop reason: HOSPADM

## 2023-05-18 RX ORDER — SODIUM CHLORIDE 0.9 % (FLUSH) 0.9 %
5-40 SYRINGE (ML) INJECTION EVERY 12 HOURS SCHEDULED
Status: DISCONTINUED | OUTPATIENT
Start: 2023-05-18 | End: 2023-05-18 | Stop reason: HOSPADM

## 2023-05-18 RX ORDER — SODIUM CHLORIDE 0.9 % (FLUSH) 0.9 %
5-40 SYRINGE (ML) INJECTION PRN
Status: DISCONTINUED | OUTPATIENT
Start: 2023-05-18 | End: 2023-05-18 | Stop reason: HOSPADM

## 2023-05-18 RX ORDER — PROPOFOL 10 MG/ML
INJECTION, EMULSION INTRAVENOUS PRN
Status: DISCONTINUED | OUTPATIENT
Start: 2023-05-18 | End: 2023-05-18 | Stop reason: SDUPTHER

## 2023-05-18 RX ADMIN — PHENYLEPHRINE HYDROCHLORIDE 100 MCG: 10 INJECTION INTRAVENOUS at 12:58

## 2023-05-18 RX ADMIN — PHENYLEPHRINE HYDROCHLORIDE 100 MCG: 10 INJECTION INTRAVENOUS at 13:01

## 2023-05-18 RX ADMIN — PHENYLEPHRINE HYDROCHLORIDE 100 MCG: 10 INJECTION INTRAVENOUS at 12:50

## 2023-05-18 RX ADMIN — LIDOCAINE HYDROCHLORIDE 100 MG: 20 INJECTION, SOLUTION INTRAVENOUS at 12:47

## 2023-05-18 RX ADMIN — PROPOFOL 70 MG: 10 INJECTION, EMULSION INTRAVENOUS at 12:47

## 2023-05-18 RX ADMIN — SODIUM CHLORIDE, POTASSIUM CHLORIDE, SODIUM LACTATE AND CALCIUM CHLORIDE: 600; 310; 30; 20 INJECTION, SOLUTION INTRAVENOUS at 11:14

## 2023-05-18 ASSESSMENT — PAIN SCALES - GENERAL: PAINLEVEL_OUTOF10: 0

## 2023-05-18 ASSESSMENT — PAIN - FUNCTIONAL ASSESSMENT: PAIN_FUNCTIONAL_ASSESSMENT: 0-10

## 2023-05-18 NOTE — BRIEF OP NOTE
Brief Postoperative Note      Patient: Dayron Brito  YOB: 1945  MRN: [id number]    Date of Procedure: 5/18/2023    Pre-Op Diagnosis Codes:     * Abnormal finding on CT scan [R93.89]     * Hx of colonic polyp [Z86.010]    Post-Op Diagnosis: Diverticulosis, no recurrent polyps, suboptimal bowel prep in descending and proximal sigmoid colon, only 80% of surface seen. Procedure(s):  COLONOSCOPY DIAGNOSTIC    Surgeon(s):  Perez Bush MD    Assistant:  * No surgical staff found *    Anesthesia: Monitor Anesthesia Care    Estimated Blood Loss (mL): Minimal    Complications: None    Specimens:   * No specimens in log *    Implants:  * No implants in log *      Drains: * No LDAs found *    Findings: As above, repeat colon in 5 years.        Electronically signed by Perez Bush MD on 5/18/2023 at 1:10 PM

## 2023-05-18 NOTE — PROGRESS NOTES
1313-Pt returned to [facility] from endoscopy procedure. Report received from [facility]. 1315-Vital signs stable. Daughter at bedside. Pt drinking fluids, tolerating well. 1338-Vital signs stable. Patient denies pain or nausea. Discharge instructions reviewed with patient and patient's daughter. Verbalized understanding. 1345-IV removed. Pt up and getting dressed. 1400-Patient discharged home with daughter. Patient stable at discharge.

## 2023-05-18 NOTE — ANESTHESIA PRE PROCEDURE
Department of Anesthesiology  Preprocedure Note       Name:  Mitchell Richter   Age:  78 y.o.  :  1945                                          MRN:  [id number]         Date:  2023      Surgeon: Awais Laird):  Mary Avalos MD    Procedure: Procedure(s):  COLONOSCOPY DIAGNOSTIC    Medications prior to admission:   Prior to Admission medications    Medication Sig Start Date End Date Taking? Authorizing Provider   megestrol (MEGACE) 40 MG tablet Take 1 tablet by mouth 2 times daily 23   Donna Laura MD   B Complex-C-Folic Acid (SHEA-ABHISHEK RX) 1 MG TABS Take 1 tablet by mouth daily 23   Donna Laura MD   ondansetron (ZOFRAN) 4 MG tablet Take 1 tablet by mouth 2 times daily 3/20/23   Donna Laura MD   ondansetron (ZOFRAN) 8 MG tablet Take 1 tablet by mouth every 8 hours as needed for Nausea or Vomiting 22   Clearance MD Yuly   pantoprazole (PROTONIX) 40 MG tablet  21   Historical Provider, MD   carvedilol (COREG) 3.125 MG tablet Take 1 tablet by mouth daily 21   Donna Laura MD   RENVELA 800 MG tablet 2 Tabs  PO TID WITH MEALS  Patient not taking: Reported on 2023   Historical Provider, MD   nitroGLYCERIN (NITRODUR) 0.2 MG/HR Place 1 patch onto the skin every other day 16   Historical Provider, MD   isosorbide mononitrate (IMDUR) 30 MG extended release tablet 1 tablet daily 16   Historical Provider, MD       Current medications:    No current facility-administered medications for this encounter.      Current Outpatient Medications   Medication Sig Dispense Refill    megestrol (MEGACE) 40 MG tablet Take 1 tablet by mouth 2 times daily      B Complex-C-Folic Acid (SHEA-ABHISHEK RX) 1 MG TABS Take 1 tablet by mouth daily      ondansetron (ZOFRAN) 4 MG tablet Take 1 tablet by mouth 2 times daily      ondansetron (ZOFRAN) 8 MG tablet Take 1 tablet by mouth every 8 hours as needed for Nausea or Vomiting 90 tablet 3    pantoprazole (PROTONIX) 40 MG tablet      
AM    AST 18 04/27/2023 09:33 AM    ALT 5 04/27/2023 09:33 AM       POC Tests: No results for input(s): POCGLU, POCNA, POCK, POCCL, POCBUN, POCHEMO, POCHCT in the last 72 hours. Coags:   Lab Results   Component Value Date/Time    PROTIME 11.4 07/19/2022 10:17 AM    INR 0.88 07/19/2022 10:17 AM    APTT 30.9 07/19/2022 10:17 AM       HCG (If Applicable): No results found for: PREGTESTUR, PREGSERUM, HCG, HCGQUANT     ABGs: No results found for: PHART, PO2ART, HYK1XUD, GBR8WKN, BEART, Y8TPYFTX     Type & Screen (If Applicable):  No results found for: LABABO, LABRH    Drug/Infectious Status (If Applicable):  No results found for: HIV, HEPCAB    COVID-19 Screening (If Applicable):   Lab Results   Component Value Date/Time    COVID19 DETECTED BY PCR 12/25/2022 02:53 PM           Anesthesia Evaluation  Patient summary reviewed no history of anesthetic complications:   Airway: Mallampati: II  TM distance: >3 FB     Mouth opening: > = 3 FB   Dental:      Comment: Multiple missing, no loose    Pulmonary:normal exam                               Cardiovascular:    (+) hypertension:,       ECG reviewed                        Neuro/Psych:               GI/Hepatic/Renal:   (+) renal disease (HD MWF, Last HD 5/17): ESRD and dialysis,           Endo/Other:    (+) malignancy/cancer (Lung CA, on immunotherapy). Abdominal:             Vascular: Other Findings:             Anesthesia Plan      MAC     ASA 3       Induction: intravenous. Anesthetic plan and risks discussed with patient. Plan discussed with CRNA.                     Gaivno Albarado MD   5/18/2023

## 2023-05-18 NOTE — ANESTHESIA POSTPROCEDURE EVALUATION
Department of Anesthesiology  Postprocedure Note    Patient: Jenae Ward  MRN: [id number]  YOB: 1945  Date of evaluation: 5/18/2023      Procedure Summary     Date: 05/18/23 Room / Location: [address] [place] [address]    Anesthesia Start: 1243 Anesthesia Stop: 2259    Procedure: COLONOSCOPY DIAGNOSTIC Diagnosis:       Abnormal finding on CT scan      Hx of colonic polyp      (Abnormal finding on CT scan [R93.89])      (Hx of colonic polyp [Z86.010])    Surgeons: Luann David MD Responsible Provider: Mehul Marie MD    Anesthesia Type: MAC ASA Status: 3          Anesthesia Type: No value filed.     Roosevelt Phase I: Roosevelt Score: 10    Roosevelt Phase II:        Anesthesia Post Evaluation    Patient location during evaluation: bedside  Patient participation: complete - patient participated  Level of consciousness: awake  Pain score: 0  Airway patency: patent  Nausea & Vomiting: no nausea and no vomiting  Complications: no  Cardiovascular status: blood pressure returned to baseline and hemodynamically stable  Respiratory status: acceptable and room air  Hydration status: euvolemic

## 2023-05-18 NOTE — H&P
HISTORY & PHYSICAL:  Patient's history with special attention to the cardiovascular, pulmonary systems and the current problem was reviewed with the patient immediately prior to the procedure. Medications, allergies, and pertinent laboratory tests were also reviewed at this time. The physical examination,as below, was then performed. Patient assessed to be ASA Class 2. Mallampati Airway Assessment: 2. History of adverse reactions involving sedation/anesthesia. None  Family history of adverse reaction to sedation/anesthesia. None      PHYSICAL EXAMINATION    /68   Pulse 63   Temp (!) 96.2 °F (35.7 °C) (Temporal)   Resp 16   Ht 5' 8\" (1.727 m)   Wt 113 lb (51.3 kg)   SpO2 100%   BMI 17.18 kg/m²     Mouth and Pharynx : Normal without focal findings  Cardiac: Regular rate and rhythm  Pulmonary: Clear bilaterally  Neurological: Normal without focal findings   Abdomen: Soft, non-tender, non-distended     Written informed consent obtained from patient. Risks (including but not limited to perforation, bloating and bleeding,) benefits and alternatives explained and questions answered. Patient verbalized understanding. Based on history and airway assessment patient is an appropriate candidate for sedation.     Oneal Smith MD  05/18/23

## 2023-05-18 NOTE — DISCHARGE INSTRUCTIONS
COLONOSCOPY    DR. Katrin Valiente     OFFICE NUMBER [phone]    FOLLOW UP AS NEEDED. REPEAT PROCEDURE IN 5 YEARS OR AS NEEDED. TEST ORDERED: NONE     What to expect at home: Your Recovery   Your doctor will tell you when you can eat and do your other usual activities Your doctor will talk to you about when you will need your next colonoscopy. Your doctor can help you decide how often you need to be checked. This will depend on the results of your test and your risk for colorectal cancer. After the test, you may be bloated or have gas pains. You may need to pass gas. If a biopsy was done or a polyp was removed, you may have streaks of blood in your stool (feces) for a few days. This care sheet gives you a general idea about how long it will take for you to recover. But each person recovers at a different pace. Follow the steps below to get better as quickly as possible. How can you care for yourself at home? Activity  Rest when you feel tired. Diet  Follow your doctor's directions for eating. Unless your doctor has told you not to, drink plenty of fluids. This helps to replace the fluids that were lost during the colon prep. DO NOT DRINK ALCOHOL. Medicines  Your doctor will tell you if and when you can restart your medicines. He or she will also give you instructions about taking any new medicines. If you take blood thinners, such as warfarin (Coumadin), clopidogrel (Plavix), or aspirin, be sure to talk to your doctor. He or she will tell you if and when to start taking those medicines again. Make sure that you understand exactly what your doctor wants you to do. If polyps were removed or a biopsy was done during the test, your doctor may tell you not to take aspirin or other anti-inflammatory medicines for a few days. These include ibuprofen (Advil, Motrin) and naproxen (Aleve). Other instructions: Anethesia  For your safety, do not drive or operate machinery for 24 hours.   Do not sign legal documents

## 2023-05-25 ENCOUNTER — HOSPITAL ENCOUNTER (OUTPATIENT)
Dept: PET IMAGING | Age: 78
Discharge: HOME | End: 2023-05-25
Payer: MEDICARE

## 2023-05-25 DIAGNOSIS — C78.01 SECONDARY MALIGNANT MELANOMA OF RIGHT LUNG (HCC): ICD-10-CM

## 2023-05-25 DIAGNOSIS — C43.61 MALIGNANT MELANOMA OF RIGHT UPPER EXTREMITY INCLUDING SHOULDER (HCC): ICD-10-CM

## 2023-05-25 PROCEDURE — 3430000000 HC RX DIAGNOSTIC RADIOPHARMACEUTICAL: Performed by: INTERNAL MEDICINE

## 2023-05-25 PROCEDURE — 2580000003 HC RX 258: Performed by: INTERNAL MEDICINE

## 2023-05-25 PROCEDURE — A9552 F18 FDG: HCPCS | Performed by: INTERNAL MEDICINE

## 2023-05-25 PROCEDURE — 78816 PET IMAGE W/CT FULL BODY: CPT

## 2023-05-25 RX ORDER — SODIUM CHLORIDE 0.9 % (FLUSH) 0.9 %
10 SYRINGE (ML) INJECTION PRN
Status: COMPLETED | OUTPATIENT
Start: 2023-05-25 | End: 2023-05-25

## 2023-05-25 RX ORDER — FLUDEOXYGLUCOSE F 18 200 MCI/ML
13.11 INJECTION, SOLUTION INTRAVENOUS
Status: COMPLETED | OUTPATIENT
Start: 2023-05-25 | End: 2023-05-25

## 2023-05-25 RX ADMIN — FLUDEOXYGLUCOSE F 18 13.11 MILLICURIE: 200 INJECTION, SOLUTION INTRAVENOUS at 11:12

## 2023-05-25 RX ADMIN — SODIUM CHLORIDE, PRESERVATIVE FREE 10 ML: 5 INJECTION INTRAVENOUS at 11:12

## 2023-05-26 ENCOUNTER — HOSPITAL ENCOUNTER (OUTPATIENT)
Dept: INFUSION THERAPY | Age: 78
Discharge: HOME | End: 2023-05-26
Payer: MEDICARE

## 2023-05-26 ENCOUNTER — OFFICE VISIT (OUTPATIENT)
Dept: ONCOLOGY | Age: 78
End: 2023-05-26
Payer: MEDICARE

## 2023-05-26 VITALS
HEIGHT: 68 IN | BODY MASS INDEX: 18.76 KG/M2 | WEIGHT: 123.8 LBS | HEART RATE: 109 BPM | SYSTOLIC BLOOD PRESSURE: 121 MMHG | TEMPERATURE: 97.4 F | DIASTOLIC BLOOD PRESSURE: 57 MMHG

## 2023-05-26 DIAGNOSIS — C43.61 MALIGNANT MELANOMA OF RIGHT UPPER EXTREMITY INCLUDING SHOULDER (HCC): Primary | ICD-10-CM

## 2023-05-26 PROCEDURE — 1124F ACP DISCUSS-NO DSCNMKR DOCD: CPT | Performed by: INTERNAL MEDICINE

## 2023-05-26 PROCEDURE — 99214 OFFICE O/P EST MOD 30 MIN: CPT | Performed by: INTERNAL MEDICINE

## 2023-05-26 PROCEDURE — 99211 OFF/OP EST MAY X REQ PHY/QHP: CPT

## 2023-05-26 NOTE — PROGRESS NOTES
MA Rooming Questions  Patient: Ricardo Auguste  MRN: [id number]    Date: 5/26/2023        1. Do you have any new issues? yes - wants to quit Keytruda, diarrhea         2. Do you need any refills on medications?    no    3. Have you had any imaging done since your last visit? yes - PET scan    4. Have you been hospitalized or seen in the emergency room since your last visit here?   no    5. Did the patient have a depression screening completed today?  No    No data recorded     PHQ-9 Given to (if applicable):               PHQ-9 Score (if applicable):                     [] Positive     []  Negative              Does question #9 need addressed (if applicable)                     [] Yes    []  No               Morton Hodgkins, CMA

## 2023-05-31 ENCOUNTER — TELEPHONE (OUTPATIENT)
Dept: RADIATION ONCOLOGY | Age: 78
End: 2023-05-31

## 2023-05-31 NOTE — TELEPHONE ENCOUNTER
Call placed to pt regarding appt today for radiation planning. Pt states after last follow up with Dr. Zoie Barrios, pt has decided to decline any further treatments/radiation at this time. Direct contact info given, advised to call with any questions or concerns. RT staff and Dr. Misty Rosario aware.

## 2023-06-19 ENCOUNTER — HOSPITAL ENCOUNTER (OUTPATIENT)
Age: 78
Setting detail: SPECIMEN
Discharge: HOME | End: 2023-06-19

## 2023-06-19 LAB — POTASSIUM SERPL-SCNC: 2.5 MMOL/L (ref 3.5–5.1)

## 2023-06-19 PROCEDURE — 84132 ASSAY OF SERUM POTASSIUM: CPT

## 2023-06-21 ENCOUNTER — HOSPITAL ENCOUNTER (OUTPATIENT)
Age: 78
Setting detail: SPECIMEN
Discharge: HOME | End: 2023-06-21

## 2023-06-21 LAB — POTASSIUM SERPL-SCNC: 2.7 MMOL/L (ref 3.5–5.1)

## 2023-06-21 PROCEDURE — 84132 ASSAY OF SERUM POTASSIUM: CPT

## 2023-06-23 ENCOUNTER — HOSPITAL ENCOUNTER (OUTPATIENT)
Age: 78
Setting detail: SPECIMEN
Discharge: HOME | End: 2023-06-23

## 2023-06-23 LAB — POTASSIUM SERPL-SCNC: 3.2 MMOL/L (ref 3.5–5.1)

## 2023-06-23 PROCEDURE — 84132 ASSAY OF SERUM POTASSIUM: CPT

## 2023-07-05 ENCOUNTER — HOSPITAL ENCOUNTER (OUTPATIENT)
Age: 78
Setting detail: SPECIMEN
Discharge: HOME | End: 2023-07-05

## 2023-07-05 LAB — POTASSIUM SERPL-SCNC: 3.7 MMOL/L (ref 3.5–5.1)

## 2023-07-05 PROCEDURE — 84132 ASSAY OF SERUM POTASSIUM: CPT

## 2023-09-06 ENCOUNTER — HOSPITAL ENCOUNTER (OUTPATIENT)
Age: 78
Setting detail: SPECIMEN
Discharge: HOME | End: 2023-09-06
Payer: MEDICARE

## 2023-09-06 PROCEDURE — 87086 URINE CULTURE/COLONY COUNT: CPT

## 2023-09-06 PROCEDURE — 87186 SC STD MICRODIL/AGAR DIL: CPT

## 2023-09-06 PROCEDURE — 87088 URINE BACTERIA CULTURE: CPT

## 2023-09-08 LAB
CULTURE: ABNORMAL
CULTURE: ABNORMAL
Lab: ABNORMAL
SPECIMEN: ABNORMAL

## 2023-11-17 ENCOUNTER — TELEPHONE (OUTPATIENT)
Dept: ONCOLOGY | Age: 78
End: 2023-11-17

## 2023-11-17 NOTE — TELEPHONE ENCOUNTER
Left message with CT scan time and prep to be done on 12/4/23 [facility] arrival at 9:30 AM. Informed to call with any questions, office visit changed until after scans are done.

## 2023-11-21 ENCOUNTER — HOSPITAL ENCOUNTER (OUTPATIENT)
Dept: INFUSION THERAPY | Age: 78
Discharge: HOME | End: 2023-11-21
Payer: MEDICARE

## 2023-11-21 DIAGNOSIS — C43.61 MALIGNANT MELANOMA OF RIGHT UPPER EXTREMITY INCLUDING SHOULDER (HCC): ICD-10-CM

## 2023-11-21 LAB
ALBUMIN SERPL-MCNC: 4.1 GM/DL (ref 3.4–5)
ALP BLD-CCNC: 149 IU/L (ref 40–128)
ALT SERPL-CCNC: 13 U/L (ref 10–40)
ANION GAP SERPL CALCULATED.3IONS-SCNC: 12 MMOL/L (ref 4–16)
AST SERPL-CCNC: 16 IU/L (ref 15–37)
BASOPHILS ABSOLUTE: 0 K/CU MM
BASOPHILS RELATIVE PERCENT: 0.3 % (ref 0–1)
BILIRUB SERPL-MCNC: 0.5 MG/DL (ref 0–1)
BUN SERPL-MCNC: 19 MG/DL (ref 6–23)
CALCIUM SERPL-MCNC: 9.6 MG/DL (ref 8.3–10.6)
CHLORIDE BLD-SCNC: 93 MMOL/L (ref 99–110)
CO2: 31 MMOL/L (ref 21–32)
CREAT SERPL-MCNC: 2.8 MG/DL (ref 0.9–1.3)
DIFFERENTIAL TYPE: ABNORMAL
EOSINOPHILS ABSOLUTE: 0.2 K/CU MM
EOSINOPHILS RELATIVE PERCENT: 2.2 % (ref 0–3)
GFR SERPL CREATININE-BSD FRML MDRD: 23 ML/MIN/1.73M2
GLUCOSE SERPL-MCNC: 117 MG/DL (ref 70–99)
HCT VFR BLD CALC: 38.1 % (ref 42–52)
HEMOGLOBIN: 12.5 GM/DL (ref 13.5–18)
LACTATE DEHYDROGENASE: 195 IU/L (ref 120–246)
LYMPHOCYTES ABSOLUTE: 2 K/CU MM
LYMPHOCYTES RELATIVE PERCENT: 25.7 % (ref 24–44)
MCH RBC QN AUTO: 29.6 PG (ref 27–31)
MCHC RBC AUTO-ENTMCNC: 32.8 % (ref 32–36)
MCV RBC AUTO: 90.1 FL (ref 78–100)
MONOCYTES ABSOLUTE: 0.6 K/CU MM
MONOCYTES RELATIVE PERCENT: 7.5 % (ref 0–4)
PDW BLD-RTO: 14.2 % (ref 11.7–14.9)
PLATELET # BLD: 212 K/CU MM (ref 140–440)
PMV BLD AUTO: 9.4 FL (ref 7.5–11.1)
POTASSIUM SERPL-SCNC: 3.6 MMOL/L (ref 3.5–5.1)
RBC # BLD: 4.23 M/CU MM (ref 4.6–6.2)
SEGMENTED NEUTROPHILS ABSOLUTE COUNT: 5.1 K/CU MM
SEGMENTED NEUTROPHILS RELATIVE PERCENT: 64.3 % (ref 36–66)
SODIUM BLD-SCNC: 136 MMOL/L (ref 135–145)
TOTAL PROTEIN: 7 GM/DL (ref 6.4–8.2)
WBC # BLD: 7.9 K/CU MM (ref 4–10.5)

## 2023-11-21 PROCEDURE — 83615 LACTATE (LD) (LDH) ENZYME: CPT

## 2023-11-21 PROCEDURE — 36415 COLL VENOUS BLD VENIPUNCTURE: CPT

## 2023-11-21 PROCEDURE — 80053 COMPREHEN METABOLIC PANEL: CPT

## 2023-11-21 PROCEDURE — 85025 COMPLETE CBC W/AUTO DIFF WBC: CPT

## 2023-12-04 ENCOUNTER — HOSPITAL ENCOUNTER (OUTPATIENT)
Dept: CT IMAGING | Age: 78
Discharge: HOME | End: 2023-12-04
Attending: INTERNAL MEDICINE
Payer: MEDICARE

## 2023-12-04 DIAGNOSIS — C43.61 MALIGNANT MELANOMA OF RIGHT UPPER EXTREMITY INCLUDING SHOULDER (HCC): ICD-10-CM

## 2023-12-04 PROCEDURE — 71260 CT THORAX DX C+: CPT

## 2023-12-04 PROCEDURE — 6360000004 HC RX CONTRAST MEDICATION: Performed by: INTERNAL MEDICINE

## 2023-12-04 PROCEDURE — 2580000003 HC RX 258: Performed by: INTERNAL MEDICINE

## 2023-12-04 RX ORDER — SODIUM CHLORIDE 0.9 % (FLUSH) 0.9 %
5-40 SYRINGE (ML) INJECTION 2 TIMES DAILY
Status: DISCONTINUED | OUTPATIENT
Start: 2023-12-04 | End: 2023-12-05 | Stop reason: HOSPADM

## 2023-12-04 RX ADMIN — IOPAMIDOL 75 ML: 755 INJECTION, SOLUTION INTRAVENOUS at 10:55

## 2023-12-04 RX ADMIN — SODIUM CHLORIDE, PRESERVATIVE FREE 10 ML: 5 INJECTION INTRAVENOUS at 10:55

## 2023-12-07 ENCOUNTER — TELEPHONE (OUTPATIENT)
Dept: ONCOLOGY | Age: 78
End: 2023-12-07

## 2023-12-07 NOTE — TELEPHONE ENCOUNTER
12/7/23 - left pt vm. Dr Shelia Downing requesting pt to come in to go over Ct results. He would like pt to come in sooner than 12/12/23 appt. I asked pt to call back to see if he can come in tommorrw at 11:15 12/8.

## 2023-12-08 ENCOUNTER — HOSPITAL ENCOUNTER (OUTPATIENT)
Dept: INFUSION THERAPY | Age: 78
Discharge: HOME | End: 2023-12-08
Payer: MEDICARE

## 2023-12-08 ENCOUNTER — OFFICE VISIT (OUTPATIENT)
Dept: ONCOLOGY | Age: 78
End: 2023-12-08
Payer: MEDICARE

## 2023-12-08 VITALS
HEART RATE: 80 BPM | TEMPERATURE: 97.5 F | SYSTOLIC BLOOD PRESSURE: 128 MMHG | RESPIRATION RATE: 16 BRPM | BODY MASS INDEX: 19.16 KG/M2 | DIASTOLIC BLOOD PRESSURE: 60 MMHG | OXYGEN SATURATION: 100 % | WEIGHT: 126.4 LBS | HEIGHT: 68 IN

## 2023-12-08 DIAGNOSIS — C43.61 MALIGNANT MELANOMA OF RIGHT UPPER EXTREMITY INCLUDING SHOULDER (HCC): Primary | ICD-10-CM

## 2023-12-08 PROCEDURE — 1124F ACP DISCUSS-NO DSCNMKR DOCD: CPT | Performed by: INTERNAL MEDICINE

## 2023-12-08 PROCEDURE — 99211 OFF/OP EST MAY X REQ PHY/QHP: CPT

## 2023-12-08 PROCEDURE — 99214 OFFICE O/P EST MOD 30 MIN: CPT | Performed by: INTERNAL MEDICINE

## 2023-12-08 ASSESSMENT — PATIENT HEALTH QUESTIONNAIRE - PHQ9
SUM OF ALL RESPONSES TO PHQ9 QUESTIONS 1 & 2: 0
SUM OF ALL RESPONSES TO PHQ QUESTIONS 1-9: 0
1. LITTLE INTEREST OR PLEASURE IN DOING THINGS: 0
SUM OF ALL RESPONSES TO PHQ QUESTIONS 1-9: 0
2. FEELING DOWN, DEPRESSED OR HOPELESS: 0
SUM OF ALL RESPONSES TO PHQ QUESTIONS 1-9: 0
SUM OF ALL RESPONSES TO PHQ QUESTIONS 1-9: 0

## 2023-12-11 ENCOUNTER — CLINICAL DOCUMENTATION (OUTPATIENT)
Dept: ONCOLOGY | Age: 78
End: 2023-12-11

## 2024-01-14 ENCOUNTER — APPOINTMENT (OUTPATIENT)
Dept: CT IMAGING | Age: 79
End: 2024-01-14
Payer: MEDICARE

## 2024-01-14 ENCOUNTER — APPOINTMENT (OUTPATIENT)
Dept: MRI IMAGING | Age: 79
End: 2024-01-14
Payer: MEDICARE

## 2024-01-14 ENCOUNTER — APPOINTMENT (OUTPATIENT)
Dept: GENERAL RADIOLOGY | Age: 79
End: 2024-01-14
Payer: MEDICARE

## 2024-01-14 ENCOUNTER — HOSPITAL ENCOUNTER (INPATIENT)
Age: 79
LOS: 5 days | Discharge: NURSING, REHAB OR LONG-TERM CARE | End: 2024-01-19
Attending: EMERGENCY MEDICINE
Payer: MEDICARE

## 2024-01-14 DIAGNOSIS — R41.0 CONFUSION: ICD-10-CM
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
## 2024-01-14 DIAGNOSIS — T14.8XXA HEMATOMA: ICD-10-CM

## 2024-01-14 DIAGNOSIS — M53.3 COCCYX PAIN: ICD-10-CM

## 2024-01-14 DIAGNOSIS — R90.0 INTRACRANIAL MASS: ICD-10-CM

## 2024-01-14 PROBLEM — C79.31 METASTASIS TO BRAIN (HCC): Status: ACTIVE | Noted: 2024-01-14

## 2024-01-14 LAB
ALBUMIN SERPL-MCNC: 4 GM/DL (ref 3.4–5)
ALP BLD-CCNC: 117 IU/L (ref 40–129)
ALT SERPL-CCNC: 14 U/L (ref 10–40)
ANION GAP SERPL CALCULATED.3IONS-SCNC: 18 MMOL/L (ref 7–16)
AST SERPL-CCNC: 14 IU/L (ref 15–37)
BACTERIA: NEGATIVE /HPF
BASOPHILS ABSOLUTE: 0 K/CU MM
BASOPHILS RELATIVE PERCENT: 0.3 % (ref 0–1)
BILIRUB SERPL-MCNC: 0.4 MG/DL (ref 0–1)
BILIRUBIN URINE: NEGATIVE MG/DL
BLOOD, URINE: ABNORMAL
BUN SERPL-MCNC: 67 MG/DL (ref 6–23)
CALCIUM SERPL-MCNC: 9.2 MG/DL (ref 8.3–10.6)
CHLORIDE BLD-SCNC: 96 MMOL/L (ref 99–110)
CLARITY: ABNORMAL
CO2: 25 MMOL/L (ref 21–32)
COLOR: YELLOW
CREAT SERPL-MCNC: 7.3 MG/DL (ref 0.9–1.3)
DIFFERENTIAL TYPE: ABNORMAL
EKG ATRIAL RATE: 83 BPM
EKG DIAGNOSIS: NORMAL
EKG P AXIS: 49 DEGREES
EKG P-R INTERVAL: 174 MS
EKG Q-T INTERVAL: 422 MS
EKG QRS DURATION: 124 MS
EKG QTC CALCULATION (BAZETT): 495 MS
EKG R AXIS: -51 DEGREES
EKG T AXIS: 63 DEGREES
EKG VENTRICULAR RATE: 83 BPM
EOSINOPHILS ABSOLUTE: 0.1 K/CU MM
EOSINOPHILS RELATIVE PERCENT: 0.5 % (ref 0–3)
GFR SERPL CREATININE-BSD FRML MDRD: 7 ML/MIN/1.73M2
GLUCOSE BLD-MCNC: 116 MG/DL (ref 70–99)
GLUCOSE BLD-MCNC: 163 MG/DL (ref 70–99)
GLUCOSE SERPL-MCNC: 102 MG/DL (ref 70–99)
GLUCOSE, URINE: NEGATIVE MG/DL
HCT VFR BLD CALC: 33.7 % (ref 42–52)
HEMOGLOBIN: 11 GM/DL (ref 13.5–18)
IMMATURE NEUTROPHIL %: 0.7 % (ref 0–0.43)
KETONES, URINE: NEGATIVE MG/DL
LEUKOCYTE ESTERASE, URINE: ABNORMAL
LYMPHOCYTES ABSOLUTE: 0.9 K/CU MM
LYMPHOCYTES RELATIVE PERCENT: 6.2 % (ref 24–44)
MCH RBC QN AUTO: 30.6 PG (ref 27–31)
MCHC RBC AUTO-ENTMCNC: 32.6 % (ref 32–36)
MCV RBC AUTO: 93.9 FL (ref 78–100)
MONOCYTES ABSOLUTE: 0.6 K/CU MM
MONOCYTES RELATIVE PERCENT: 4.1 % (ref 0–4)
NITRITE URINE, QUANTITATIVE: NEGATIVE
NUCLEATED RBC %: 0 %
PDW BLD-RTO: 13.3 % (ref 11.7–14.9)
PH, URINE: 8 (ref 5–8)
PLATELET # BLD: 261 K/CU MM (ref 140–440)
PMV BLD AUTO: 9.3 FL (ref 7.5–11.1)
POTASSIUM SERPL-SCNC: 4.8 MMOL/L (ref 3.5–5.1)
PROTEIN UA: 100 MG/DL
RBC # BLD: 3.59 M/CU MM (ref 4.6–6.2)
RBC URINE: 9 /HPF (ref 0–3)
SEGMENTED NEUTROPHILS ABSOLUTE COUNT: 12.4 K/CU MM
SEGMENTED NEUTROPHILS RELATIVE PERCENT: 88.2 % (ref 36–66)
SODIUM BLD-SCNC: 139 MMOL/L (ref 135–145)
SPECIFIC GRAVITY UA: 1.01 (ref 1–1.03)
SQUAMOUS EPITHELIAL: 1 /HPF
TOTAL IMMATURE NEUTOROPHIL: 0.1 K/CU MM
TOTAL NUCLEATED RBC: 0 K/CU MM
TOTAL PROTEIN: 7.1 GM/DL (ref 6.4–8.2)
TRICHOMONAS: ABNORMAL /HPF
UROBILINOGEN, URINE: 0.2 MG/DL (ref 0.2–1)
WBC # BLD: 14.1 K/CU MM (ref 4–10.5)
WBC UA: 354 /HPF (ref 0–2)

## 2024-01-14 PROCEDURE — 6360000002 HC RX W HCPCS: Performed by: PHYSICIAN ASSISTANT

## 2024-01-14 PROCEDURE — 99222 1ST HOSP IP/OBS MODERATE 55: CPT | Performed by: INTERNAL MEDICINE

## 2024-01-14 PROCEDURE — 70450 CT HEAD/BRAIN W/O DYE: CPT

## 2024-01-14 PROCEDURE — 6360000002 HC RX W HCPCS: Performed by: EMERGENCY MEDICINE

## 2024-01-14 PROCEDURE — 87186 SC STD MICRODIL/AGAR DIL: CPT

## 2024-01-14 PROCEDURE — 93005 ELECTROCARDIOGRAM TRACING: CPT | Performed by: EMERGENCY MEDICINE

## 2024-01-14 PROCEDURE — 6370000000 HC RX 637 (ALT 250 FOR IP): Performed by: PHYSICIAN ASSISTANT

## 2024-01-14 PROCEDURE — 82962 GLUCOSE BLOOD TEST: CPT

## 2024-01-14 PROCEDURE — 71046 X-RAY EXAM CHEST 2 VIEWS: CPT

## 2024-01-14 PROCEDURE — 6360000002 HC RX W HCPCS: Performed by: STUDENT IN AN ORGANIZED HEALTH CARE EDUCATION/TRAINING PROGRAM

## 2024-01-14 PROCEDURE — A9579 GAD-BASE MR CONTRAST NOS,1ML: HCPCS | Performed by: PHYSICIAN ASSISTANT

## 2024-01-14 PROCEDURE — 85025 COMPLETE CBC W/AUTO DIFF WBC: CPT

## 2024-01-14 PROCEDURE — 3E0333Z INTRODUCTION OF ANTI-INFLAMMATORY INTO PERIPHERAL VEIN, PERCUTANEOUS APPROACH: ICD-10-PCS | Performed by: NEUROLOGICAL SURGERY

## 2024-01-14 PROCEDURE — 2580000003 HC RX 258: Performed by: PHYSICIAN ASSISTANT

## 2024-01-14 PROCEDURE — 81003 URINALYSIS AUTO W/O SCOPE: CPT

## 2024-01-14 PROCEDURE — 99285 EMERGENCY DEPT VISIT HI MDM: CPT

## 2024-01-14 PROCEDURE — 94761 N-INVAS EAR/PLS OXIMETRY MLT: CPT

## 2024-01-14 PROCEDURE — C9113 INJ PANTOPRAZOLE SODIUM, VIA: HCPCS | Performed by: PHYSICIAN ASSISTANT

## 2024-01-14 PROCEDURE — 72220 X-RAY EXAM SACRUM TAILBONE: CPT

## 2024-01-14 PROCEDURE — 96375 TX/PRO/DX INJ NEW DRUG ADDON: CPT

## 2024-01-14 PROCEDURE — 70553 MRI BRAIN STEM W/O & W/DYE: CPT

## 2024-01-14 PROCEDURE — 80053 COMPREHEN METABOLIC PANEL: CPT

## 2024-01-14 PROCEDURE — 2000000000 HC ICU R&B

## 2024-01-14 PROCEDURE — 6360000004 HC RX CONTRAST MEDICATION: Performed by: PHYSICIAN ASSISTANT

## 2024-01-14 PROCEDURE — 96374 THER/PROPH/DIAG INJ IV PUSH: CPT

## 2024-01-14 PROCEDURE — 93010 ELECTROCARDIOGRAM REPORT: CPT | Performed by: INTERNAL MEDICINE

## 2024-01-14 PROCEDURE — 81001 URINALYSIS AUTO W/SCOPE: CPT

## 2024-01-14 PROCEDURE — 87086 URINE CULTURE/COLONY COUNT: CPT

## 2024-01-14 PROCEDURE — 6370000000 HC RX 637 (ALT 250 FOR IP): Performed by: EMERGENCY MEDICINE

## 2024-01-14 PROCEDURE — 87040 BLOOD CULTURE FOR BACTERIA: CPT

## 2024-01-14 PROCEDURE — 87088 URINE BACTERIA CULTURE: CPT

## 2024-01-14 PROCEDURE — 2580000003 HC RX 258: Performed by: STUDENT IN AN ORGANIZED HEALTH CARE EDUCATION/TRAINING PROGRAM

## 2024-01-14 RX ORDER — MEGESTROL ACETATE 20 MG/1
40 TABLET ORAL 2 TIMES DAILY
Status: DISCONTINUED | OUTPATIENT
Start: 2024-01-14 | End: 2024-01-19 | Stop reason: HOSPADM

## 2024-01-14 RX ORDER — SODIUM CHLORIDE 9 MG/ML
INJECTION, SOLUTION INTRAVENOUS PRN
Status: DISCONTINUED | OUTPATIENT
Start: 2024-01-14 | End: 2024-01-19 | Stop reason: HOSPADM

## 2024-01-14 RX ORDER — DEXAMETHASONE SODIUM PHOSPHATE 10 MG/ML
6 INJECTION, SOLUTION INTRAMUSCULAR; INTRAVENOUS EVERY 6 HOURS
Status: DISCONTINUED | OUTPATIENT
Start: 2024-01-14 | End: 2024-01-16

## 2024-01-14 RX ORDER — LEVETIRACETAM 500 MG/5ML
500 INJECTION, SOLUTION, CONCENTRATE INTRAVENOUS EVERY 12 HOURS
Status: DISCONTINUED | OUTPATIENT
Start: 2024-01-14 | End: 2024-01-19 | Stop reason: HOSPADM

## 2024-01-14 RX ORDER — DEXAMETHASONE SODIUM PHOSPHATE 4 MG/ML
4 INJECTION, SOLUTION INTRA-ARTICULAR; INTRALESIONAL; INTRAMUSCULAR; INTRAVENOUS; SOFT TISSUE EVERY 6 HOURS
Status: DISCONTINUED | OUTPATIENT
Start: 2024-01-14 | End: 2024-01-14

## 2024-01-14 RX ORDER — SODIUM CHLORIDE 0.9 % (FLUSH) 0.9 %
5-40 SYRINGE (ML) INJECTION EVERY 12 HOURS SCHEDULED
Status: DISCONTINUED | OUTPATIENT
Start: 2024-01-14 | End: 2024-01-19 | Stop reason: HOSPADM

## 2024-01-14 RX ORDER — ACETAMINOPHEN 325 MG/1
650 TABLET ORAL ONCE
Status: COMPLETED | OUTPATIENT
Start: 2024-01-14 | End: 2024-01-14

## 2024-01-14 RX ORDER — ACETAMINOPHEN 325 MG/1
650 TABLET ORAL EVERY 4 HOURS PRN
Status: DISCONTINUED | OUTPATIENT
Start: 2024-01-14 | End: 2024-01-14 | Stop reason: SDUPTHER

## 2024-01-14 RX ORDER — CARVEDILOL 6.25 MG/1
3.12 TABLET ORAL 2 TIMES DAILY
Status: DISCONTINUED | OUTPATIENT
Start: 2024-01-14 | End: 2024-01-19 | Stop reason: HOSPADM

## 2024-01-14 RX ORDER — ONDANSETRON 2 MG/ML
4 INJECTION INTRAMUSCULAR; INTRAVENOUS EVERY 6 HOURS PRN
Status: DISCONTINUED | OUTPATIENT
Start: 2024-01-14 | End: 2024-01-19 | Stop reason: HOSPADM

## 2024-01-14 RX ORDER — PANTOPRAZOLE SODIUM 40 MG/10ML
40 INJECTION, POWDER, LYOPHILIZED, FOR SOLUTION INTRAVENOUS DAILY
Status: DISCONTINUED | OUTPATIENT
Start: 2024-01-14 | End: 2024-01-16

## 2024-01-14 RX ORDER — ONDANSETRON 2 MG/ML
4 INJECTION INTRAMUSCULAR; INTRAVENOUS EVERY 6 HOURS PRN
Status: DISCONTINUED | OUTPATIENT
Start: 2024-01-14 | End: 2024-01-14

## 2024-01-14 RX ORDER — ONDANSETRON 4 MG/1
4 TABLET, ORALLY DISINTEGRATING ORAL EVERY 8 HOURS PRN
Status: DISCONTINUED | OUTPATIENT
Start: 2024-01-14 | End: 2024-01-14

## 2024-01-14 RX ORDER — INSULIN LISPRO 100 [IU]/ML
0-4 INJECTION, SOLUTION INTRAVENOUS; SUBCUTANEOUS
Status: DISCONTINUED | OUTPATIENT
Start: 2024-01-14 | End: 2024-01-19 | Stop reason: HOSPADM

## 2024-01-14 RX ORDER — ONDANSETRON 4 MG/1
4 TABLET, ORALLY DISINTEGRATING ORAL EVERY 8 HOURS PRN
Status: DISCONTINUED | OUTPATIENT
Start: 2024-01-14 | End: 2024-01-19 | Stop reason: HOSPADM

## 2024-01-14 RX ORDER — GLUCAGON 1 MG/ML
1 KIT INJECTION PRN
Status: DISCONTINUED | OUTPATIENT
Start: 2024-01-14 | End: 2024-01-19 | Stop reason: HOSPADM

## 2024-01-14 RX ORDER — SODIUM CHLORIDE 0.9 % (FLUSH) 0.9 %
5-40 SYRINGE (ML) INJECTION PRN
Status: DISCONTINUED | OUTPATIENT
Start: 2024-01-14 | End: 2024-01-19 | Stop reason: HOSPADM

## 2024-01-14 RX ORDER — POLYETHYLENE GLYCOL 3350 17 G/17G
17 POWDER, FOR SOLUTION ORAL DAILY PRN
Status: DISCONTINUED | OUTPATIENT
Start: 2024-01-14 | End: 2024-01-19 | Stop reason: HOSPADM

## 2024-01-14 RX ORDER — INSULIN LISPRO 100 [IU]/ML
0-4 INJECTION, SOLUTION INTRAVENOUS; SUBCUTANEOUS NIGHTLY
Status: DISCONTINUED | OUTPATIENT
Start: 2024-01-14 | End: 2024-01-19 | Stop reason: HOSPADM

## 2024-01-14 RX ORDER — ACETAMINOPHEN 325 MG/1
650 TABLET ORAL EVERY 6 HOURS PRN
Status: DISCONTINUED | OUTPATIENT
Start: 2024-01-14 | End: 2024-01-19 | Stop reason: HOSPADM

## 2024-01-14 RX ORDER — HYDRALAZINE HYDROCHLORIDE 20 MG/ML
10 INJECTION INTRAMUSCULAR; INTRAVENOUS EVERY 6 HOURS PRN
Status: DISCONTINUED | OUTPATIENT
Start: 2024-01-14 | End: 2024-01-19

## 2024-01-14 RX ORDER — ACETAMINOPHEN 650 MG/1
650 SUPPOSITORY RECTAL EVERY 6 HOURS PRN
Status: DISCONTINUED | OUTPATIENT
Start: 2024-01-14 | End: 2024-01-19 | Stop reason: HOSPADM

## 2024-01-14 RX ORDER — DEXTROSE MONOHYDRATE 100 MG/ML
INJECTION, SOLUTION INTRAVENOUS CONTINUOUS PRN
Status: DISCONTINUED | OUTPATIENT
Start: 2024-01-14 | End: 2024-01-19 | Stop reason: HOSPADM

## 2024-01-14 RX ORDER — ISOSORBIDE MONONITRATE 30 MG/1
30 TABLET, EXTENDED RELEASE ORAL DAILY
Status: DISCONTINUED | OUTPATIENT
Start: 2024-01-14 | End: 2024-01-19 | Stop reason: HOSPADM

## 2024-01-14 RX ORDER — SEVELAMER CARBONATE 800 MG/1
800 TABLET, FILM COATED ORAL
Status: DISCONTINUED | OUTPATIENT
Start: 2024-01-14 | End: 2024-01-19 | Stop reason: HOSPADM

## 2024-01-14 RX ORDER — DEXAMETHASONE SODIUM PHOSPHATE 10 MG/ML
10 INJECTION, SOLUTION INTRAMUSCULAR; INTRAVENOUS ONCE
Status: COMPLETED | OUTPATIENT
Start: 2024-01-14 | End: 2024-01-14

## 2024-01-14 RX ADMIN — DEXAMETHASONE SODIUM PHOSPHATE 6 MG: 10 INJECTION INTRAMUSCULAR; INTRAVENOUS at 20:32

## 2024-01-14 RX ADMIN — ISOSORBIDE MONONITRATE 30 MG: 30 TABLET, EXTENDED RELEASE ORAL at 10:10

## 2024-01-14 RX ADMIN — PANTOPRAZOLE SODIUM 40 MG: 40 INJECTION, POWDER, FOR SOLUTION INTRAVENOUS at 10:09

## 2024-01-14 RX ADMIN — SODIUM CHLORIDE, PRESERVATIVE FREE 10 ML: 5 INJECTION INTRAVENOUS at 10:11

## 2024-01-14 RX ADMIN — MEROPENEM 1000 MG: 1 INJECTION, POWDER, FOR SOLUTION INTRAVENOUS at 15:45

## 2024-01-14 RX ADMIN — DEXAMETHASONE SODIUM PHOSPHATE 6 MG: 10 INJECTION INTRAMUSCULAR; INTRAVENOUS at 15:44

## 2024-01-14 RX ADMIN — GADOTERIDOL 11 ML: 279.3 INJECTION, SOLUTION INTRAVENOUS at 14:44

## 2024-01-14 RX ADMIN — SODIUM CHLORIDE, PRESERVATIVE FREE 10 ML: 5 INJECTION INTRAVENOUS at 20:33

## 2024-01-14 RX ADMIN — MEGESTROL ACETATE 40 MG: 20 TABLET ORAL at 10:09

## 2024-01-14 RX ADMIN — LEVETIRACETAM 500 MG: 100 INJECTION, SOLUTION INTRAVENOUS at 08:54

## 2024-01-14 RX ADMIN — MEGESTROL ACETATE 40 MG: 20 TABLET ORAL at 20:32

## 2024-01-14 RX ADMIN — SEVELAMER CARBONATE 800 MG: 800 TABLET, FILM COATED ORAL at 17:42

## 2024-01-14 RX ADMIN — DEXAMETHASONE SODIUM PHOSPHATE 10 MG: 10 INJECTION INTRAMUSCULAR; INTRAVENOUS at 08:54

## 2024-01-14 RX ADMIN — ACETAMINOPHEN 650 MG: 325 TABLET ORAL at 06:48

## 2024-01-14 RX ADMIN — CARVEDILOL 3.12 MG: 6.25 TABLET, FILM COATED ORAL at 20:32

## 2024-01-14 RX ADMIN — SEVELAMER CARBONATE 800 MG: 800 TABLET, FILM COATED ORAL at 10:10

## 2024-01-14 RX ADMIN — SODIUM CHLORIDE, PRESERVATIVE FREE 10 ML: 5 INJECTION INTRAVENOUS at 11:22

## 2024-01-14 RX ADMIN — LEVETIRACETAM 500 MG: 100 INJECTION, SOLUTION INTRAVENOUS at 20:33

## 2024-01-14 ASSESSMENT — PAIN - FUNCTIONAL ASSESSMENT: PAIN_FUNCTIONAL_ASSESSMENT: 0-10

## 2024-01-14 ASSESSMENT — LIFESTYLE VARIABLES
HOW OFTEN DO YOU HAVE A DRINK CONTAINING ALCOHOL: NEVER
HOW MANY STANDARD DRINKS CONTAINING ALCOHOL DO YOU HAVE ON A TYPICAL DAY: PATIENT DOES NOT DRINK

## 2024-01-14 ASSESSMENT — PAIN DESCRIPTION - ORIENTATION: ORIENTATION: ANTERIOR

## 2024-01-14 ASSESSMENT — PAIN DESCRIPTION - DESCRIPTORS: DESCRIPTORS: ACHING

## 2024-01-14 ASSESSMENT — PAIN DESCRIPTION - PAIN TYPE: TYPE: ACUTE PAIN

## 2024-01-14 ASSESSMENT — PAIN DESCRIPTION - LOCATION: LOCATION: CHEST

## 2024-01-14 ASSESSMENT — PAIN SCALES - GENERAL
PAINLEVEL_OUTOF10: 3
PAINLEVEL_OUTOF10: 0

## 2024-01-14 NOTE — CONSULTS
ONCOLOGY HEMATOLOGY  CONSULTATION REPORT    2024  6:18 PM    Patient:    Christiano Miller  : 1945   78 y.o.             MRN: [id number]  Admitted: 2024  6:36 AM ATT: Vidhya Hummel MD   -A  AdmitDx: Intracranial mass [R90.0]  Coccyx pain [M53.3]  Confusion [R41.0]  Metastasis to brain (HCC) [C79.31]  Fall, initial encounter [W19.XXXA]  PCP: Musa Corral MD    Reason for Consult: brain mets    Requesting Physician:  Vidhya Hummel MD      History Obtained From:  Patient and review of all records      CHIEF COMPLAINT    Chief Complaint   Patient presents with    Fall       HISTORY OF PRESENT ILLNESS   Christiano Miller is a 78 y.o. male who was admitted after attaining a fall, unsteady gait and also dysarthria.  Denies any syncopal episode.  Denies any vision changes.  Reported dizziness.  No other focal weakness.  CT scan of the head on admission with multiple hypodense lesions mostly hemorrhagic brain metastasis.  This was followed by MRI of the brain which revealed 5 intracranial metastasis, largest of which is hemorrhagic in the right frontal lobe.  With surrounding edema.  Got a loading dose of steroids followed by maintenance steroids.  Has been evaluated by neurosurgery.      BACKGROUND ONCOLOGY HISTORY:  Patient known to Dr. Lugo, with diagnosis of melanoma, stage IIa in May 2021.  He had wide excision with sentinel lymph node biopsy.  He was under surveillance until 2022 when he was diagnosed with metastatic melanoma.  BRAF could not be performed secondary to insufficient tissue.  He started immunotherapy  and looks like he received until 2023.  He did have response as per PET scan in May 2023.  He then stopped Keytruda secondary to adverse effects.  Refused radiation therapy or BRAF inhibitor.  Repeat CT scan of the chest with interval increase in the size of the medial right upper lobe lung nodule.  In addition there were new large right

## 2024-01-14 NOTE — PROGRESS NOTES
Messaged ordering provider regarding MRI - pts GFR is 7 and the requested STEALTH protocol is a contrasted study. Waiting for reply back.

## 2024-01-14 NOTE — CONSULTS
Nephrology Service Consultation      [address], [address]  [place] [address]  Phone: [phone]  Office Hours: 8:30AM - 4:30PM  Monday - Friday        MEDICAL DECISION MAKING and Recommendations   -S/p fall at home  -Intracerebral hemorrhagic masses with vasogenic edema: suspected to be malignant  -HTN hx  -ESRD on HD MWF    Suggest:  -Appreciate neurosurgeon's assistance  -Planning HD tomorrow  -Traditional gadolinium is not recommend in pts with renal disease.  Some of the newer gadolinium based contrast agents have been found to be safe in pts with renal disease so if that is available at [facility] then the MRI brain with contrast will be doable  -Limb alert on LUE since his AVF is there    Thank you        Patient Active Problem List    Diagnosis Date Noted    Chronic kidney disease (CKD), stage V (HCC) [969896] 03/09/2023    Protein calorie malnutrition 03/09/2023    Moderate malnutrition (HCC) 12/27/2022    Acute respiratory failure (HCC) 12/25/2022    Lung mass 10/12/2022    Metastasis to brain (HCC) 01/14/2024    History of colon polyps 05/18/2023    Malignant melanoma of right upper extremity including shoulder (HCC) 06/22/2021    Hemodialysis access site with mature fistula (HCC) 07/07/2019    Urinary tract infection due to extended-spectrum beta lactamase (ESBL) producing Escherichia coli 07/03/2019    Chronic kidney disease (CKD), stage IV (severe) (HCC) 11/16/2016    Right inguinal hernia 11/16/2016         Patient:  Christiano Miller  MRN: [id number]  Consulting physician:  Vidhya Hummel MD  Reason for Consult:  Office pt  PCP: Musa Corral MD    HISTORY OF PRESENT ILLNESS:   The patient is a 78 y.o. male with ESRD on HD MWF, presented due to a fall at home.  He also reports cough.  He was found to have intracranial hemorrhagic masses with surrounding edema  Renal consult as he is an office pt and he gets HD on MWF  He has a LUE AVF      REVIEW OF SYSTEMS:  14 point ROS is Negative.  needed for Nausea or Vomiting 11/30/22   Orlando Schmidt MD   pantoprazole (PROTONIX) 40 MG tablet  5/11/21   Filemon Oseguera MD   carvedilol (COREG) 3.125 MG tablet Take 1 tablet by mouth daily 5/11/21   Musa Corral MD   RENVELA 800 MG tablet  1/7/17   Filemon Oseguera MD   nitroGLYCERIN (NITRODUR) 0.2 MG/HR Place 1 patch onto the skin every other day 11/2/16   Filemon Oseguera MD   isosorbide mononitrate (IMDUR) 30 MG extended release tablet 1 tablet daily 11/2/16   Filemon Oseguera MD        Allergies:  Patient has no known allergies.    Social History:   TOBACCO:   reports that he quit smoking about 31 years ago. His smoking use included cigarettes. He started smoking about 62 years ago. He has a 60.5 pack-year smoking history. He has never used smokeless tobacco.  ETOH:   reports no history of alcohol use.  OCCUPATION:      Family History:       Problem Relation Age of Onset    Diabetes Mother     Diabetes Brother     Heart Disease Brother      Physical Exam:    Vitals: BP (!) 163/75   Pulse 89   Temp 98.4 °F (36.9 °C) (Oral)   Resp 18   Ht 1.727 m (5' 8\")   Wt 59 kg (130 lb)   SpO2 99%   BMI 19.77 kg/m²   General appearance: in no acute distress, appears stated age  Skin: Skin color, texture, turgor normal. No rashes or lesions  HEENT: normocephalic, atraumatic  Neck: supple, trachea midline  Lungs:  breathing comfortably  Heart:: regular rate and rhythm, S1, S2 normal,  Abdomen: soft, non-tender;   Extremities: extremities normal, atraumatic, no cyanosis or edema, LUE AVF  Neurologic: Mental status: alert, oriented, interactive, following commands  Psychiatric: mood and affect appropriate     CBC:   Recent Labs     01/14/24  0725   WBC 14.1*   HGB 11.0*        BMP:    Recent Labs     01/14/24  0725      K 4.8   CL 96*   CO2 25   BUN 67*   CREATININE 7.3*   GLUCOSE 102*     Hepatic:   Recent Labs     01/14/24  0725   AST 14*   ALT 14   BILITOT 0.4   ALKPHOS 117

## 2024-01-14 NOTE — PROGRESS NOTES
Spoke to radiologist Dr Cueva - he is agreeable to allow us to proceed with contrast for MRI as long as the ordering provider documents in patients chart that they believe the benefit will outweigh any potential risk to the patient. I PS'd the ordering and MR team will proceed after we have the requested documentation per Anay.

## 2024-01-14 NOTE — ED NOTES
ED TO INPATIENT SBAR HANDOFF    Patient Name: Christiano Miller   :  1945  78 y.o.   Preferred Name  Christiano  Family/Caregiver Present no   Restraints no   C-SSRS: Risk of Suicide: No Risk  Sitter no   Sepsis Risk Score Sepsis Risk Score: 1.32      Situation  Chief Complaint   Patient presents with    Fall     Brief Description of Patient's Condition: Intracerebral hemorrhagic masses with vasogenic edema: suspected to be malignant   Mental Status: oriented and alert  Arrived from: home    Imaging:   CT HEAD WO CONTRAST   Final Result   1. Multiple hyperdense intra-axial masses with surrounding edema but no mass   effect or midline shift.  These findings are compatible with hemorrhagic   metastases from his known melanoma.  I would recommend MRI brain with   contrast for further evaluation.         XR CHEST (2 VW)   Final Result   No acute cardiopulmonary process.         XR SACRUM COCCYX (MIN 2 VIEWS)   Final Result   1. No acute abnormality involving the sacrum or coccyx.         MRI BRAIN W WO CONTRAST    (Results Pending)   CT head without contrast    (Results Pending)     Abnormal labs:   Abnormal Labs Reviewed   CBC WITH AUTO DIFFERENTIAL - Abnormal; Notable for the following components:       Result Value    WBC 14.1 (*)     RBC 3.59 (*)     Hemoglobin 11.0 (*)     Hematocrit 33.7 (*)     Segs Relative 88.2 (*)     Lymphocytes % 6.2 (*)     Monocytes % 4.1 (*)     Immature Neutrophil % 0.7 (*)     All other components within normal limits   COMPREHENSIVE METABOLIC PANEL - Abnormal; Notable for the following components:    Chloride 96 (*)     Anion Gap 18 (*)     Glucose 102 (*)     BUN 67 (*)     Creatinine 7.3 (*)     Est, Glom Filt Rate 7 (*)     AST 14 (*)     All other components within normal limits       Background  History:   Past Medical History:   Diagnosis Date    Abnormal urine     abn urine culture 4/3/2017- OR for 2017 cancelled-rescheduled for 2017- ( on 2017 pt states

## 2024-01-14 NOTE — PROGRESS NOTES
4 Eyes Skin Assessment     NAME:  Christiano Miller  YOB: 1945  MEDICAL RECORD NUMBER:  [id number]    The patient is being assessed for  Admission    I agree that at least one RN has performed a thorough Head to Toe Skin Assessment on the patient. ALL assessment sites listed below have been assessed.      Areas assessed by both nurses:    Head, Face, Ears, Shoulders, Back, Chest, Arms, Elbows, Hands, Sacrum. Buttock, Coccyx, Ischium, Legs. Feet and Heels, Under Medical Devices , and Other          Does the Patient have a Wound? No noted wound(s)       Jose Prevention initiated by RN: No  Wound Care Orders initiated by RN: No    Pressure Injury (Stage 3,4, Unstageable, DTI, NWPT, and Complex wounds) if present, place Wound referral order by RN under : No    New Ostomies, if present place, Ostomy referral order under : No     Nurse 1 eSignature: Electronically signed by Rashmi Brasher RN on 1/14/24 at 6:45 PM EST    **SHARE this note so that the co-signing nurse can place an eSignature**    Nurse 2 eSignature: Electronically signed by Jodee Husain RN on 1/14/24 at 6:47 PM EST

## 2024-01-14 NOTE — H&P
culture-pending (history of ESBL positive E. Coli UTI in 9/23), started on Merrem, f/u blood cx    Endo: No hx of DM, POC checks ordered to ensure euglycemia as pt is on steroids, ordered low dose SSI.    MSK: DTI, ambulate as able after MRI brain is obtained and is stable with no expansion of hemorrhage      Chronic Conditions: Continue all home medications except as stated above or contraindicated.      BMI: 19.77 kg/m2 Life style modifications    Tobacco dependence: Remote history of smoking quit in 1992, nonalcoholic (quit 2-1/2 years ago 3 to 4 beers before )  Disposition: ICU. Patient was accepted for continue evaluation and treatment and improvement of clinical symptoms.  Discussed assessment and treatment plan with the admitting and supervising physician Dr. Hummel who agrees with the plan.       Diet Diet NPO   DVT Prophylaxis [] Lovenox, []  Heparin, [x] SCDs, [] Warfarin  [] NOAC     GI Prophylaxis [x] PPI,  [] H2 Blocker,  [] Carafate,  [] Diet/Tube Feeds   Code Status Full Code     History of Present Illness:     Chief Complaint: Metastasis to brain (HCC)  Christiano Miller is a 78 y.o.  male, with past medical history significant for ESRD on HD MWF, prostatomegaly with elevated PSA with LUTS, recent history of ESBL positive E. coli UTI in 9/23, primary malignant melanoma with metastasis to lung, sigmoid colon, severe diverticulosis who presented to [facility] ER after a fall.  Patient called for help, no loss of consciousness, noted mild dysarthria, no motor or sensory changes, no vision changes, reports mild headache, no fever, chills, urinary frequency/urgency, burning micturition.  CT head without contrast showed multiple hypodense lesions, most likely hemorrhagic brain metastasis, consulted neurosurgery, given one-time of 10 mg IV Decadron followed by 6 mg IV every 6 hours, Keppra 500 mg twice daily for seizure prophylaxis, repeat CT head in 6 hours to ensure the stability of hemorrhage, MRI brain with  NORMAL TECHNOLOGIST PROVIDED HISTORY: Has a \"code stroke\" or \"stroke alert\" been called?->No Reason for exam:->fall, confused Decision Support Exception - unselect if not a suspected or confirmed emergency medical condition->Emergency Medical Condition (MA) Reason for Exam: fall, confused; Head trauma, closed, mild, GCS >= 13, no risk factors, neuro exam normal FINDINGS: BRAIN/VENTRICLES: There is a mixed density hyperdense mass involving the left temporal lobe measuring 2.1 x 1.5 cm.  There is surrounding low attenuation. There is a larger hyperdense mass in the high right frontal lobe measuring 3.3 x 3.1 cm with surrounding low-attenuation.  There is a 3rd hyperdense lesion seen adjacent to the larger right frontal lesion measuring 1 cm.  In the left parietal lobe, there is frondlike low-attenuation.  No mass-effect or midline shift is seen.  No acute areas of infarction are noted.  The cerebral sulci and ventricles are enlarged compatible with diffuse cerebral atrophy. There is low-attenuation in the periventricular white matter and deep white matter of the brain representing changes of chronic small vessel ischemic disease. ORBITS: The visualized portion of the orbits demonstrate no acute abnormality. SINUSES: The visualized paranasal sinuses and mastoid air cells demonstrate no acute abnormality.  There is mucosal disease involving the maxillary, ethmoid, sphenoid and frontal sinuses. SOFT TISSUES/SKULL: No acute abnormality of the visualized skull or soft tissues.     1. Multiple hyperdense intra-axial masses with surrounding edema but no mass effect or midline shift.  These findings are compatible with hemorrhagic metastases from his known melanoma.  I would recommend MRI brain with contrast for further evaluation.      Total CC time 40 min  CC time doesn't include separately billable procedures.    Electronically signed by Lin Rai PA-C on 1/14/2024 at 11:09 AM

## 2024-01-14 NOTE — ED PROVIDER NOTES
Emergency Department Encounter    Patient: Christiano Miller  MRN: [id number]  : 1945  Date of Evaluation: 2024  ED Provider:  Roopa Taylor MD    Triage Chief Complaint:   Fall    Native:  Christiano Miller is a 78 y.o. male that presents with concern for fall.  He was in the shower this morning and slipped, landed on his tailbone.  He complains of pain only at his tailbone, 3 out of 10.  It took him some time to get himself scooted over to pull his call cord that he has in the bathroom.  He lives in an apartment.  He is a dialysis patient.  He denies other back pain.  He denies abdominal pain.  Denies hitting his head or his neck.  When EMS got there they were able to help him to stand, and they also helped him to get into his clothing, he was moving everything, standing and walking okay, had some mild pain at his buttocks but otherwise was walking for them.    He also complained of pain at his ribs to the nurse but not initially to me- when asked about this he states it hurts when he coughs occasionally. No CP otherwise. No SOB. No sputum production. No fevers.     ROS - see HPI, below listed is current ROS at time of my eval:  10 systems reviewed and negative except as above.     Past Medical History:   Diagnosis Date    Abnormal urine     abn urine culture 4/3/2017- OR for 2017 cancelled-rescheduled for 2017- ( on 2017 pt states finishes antibiotic today and had repeat urine culture at office last week)    Cancer (HCC)     melanoma    Chronic kidney disease, stage 4, severely decreased GFR ([facility]) 2016    Dialysis on Mon-Wed-Fri    follows with Dr Ellis Childers     Nunez catheter in place     Has had in place since 16    Hearing deficit     both ears --no hearing aids    Hemodialysis patient ([facility])     Sees Dr Corral    Dialysis on Mon-Wed-Fri    History of blood transfusion     Hypertension     Follows with Dr. Corral    Inguinal hernia     Missing teeth, acquired     upper  excludes seperately billable procedure. Critical care time provided for AMS, intracranial masses with edema and internal hemorrhage that required close evaluation and/or intervention with concern for patient decompensation.    Clinical Impression:  1. Fall, initial encounter    2. Intracranial mass    3. Confusion    4. Coccyx pain      Disposition referral (if applicable):  No follow-up provider specified.  Disposition medications (if applicable):  Current Discharge Medication List        ED Provider Disposition Time  DISPOSITION Admitted 01/14/2024 09:00:56 AM      Comment: Please note this report has been produced using speech recognition software and may contain errors related to that system including errors in grammar, punctuation, and spelling, as well as words and phrases that may be inappropriate.  Efforts were made to edit the dictations.        Roopa Taylor MD  01/14/24 2332

## 2024-01-14 NOTE — CONSULTS
Head CT shows mult intracerebral hemorrhagic masses.    The largest is in the right frontal lobe.    There is surrounding vasogenic edema.    Mild mass effect.  Consistent with metastatic melanoma.    Recommend:   Keppra 500 mg bid,  Decadron 10 mg once, then 6 mg q 6  hrs,  Brain MRI with and without contrast,  Heme-onc consult,  Rad-onc consult,  Admit to hospitalist service,  Will follow.

## 2024-01-14 NOTE — PROGRESS NOTES
Pt  has Hx of ESRD, on hemodialysis.  Needs MRI w/wo contrast STEALTH protocol to further evaluate hemorrhagic brain mets noted on the CT head w/o contrast from this am,  Risk of administering contrast for the study outweighs the benefit of diagnosing the condition which will enable to appropriately treat.    Requested Radiology to proceed with the imaging study: MRI brain w/wo contrast stealth protocol.  Dr. Hummel is made aware.

## 2024-01-15 ENCOUNTER — ANESTHESIA EVENT (OUTPATIENT)
Dept: OPERATING ROOM | Age: 79
End: 2024-01-15
Payer: MEDICARE

## 2024-01-15 ENCOUNTER — APPOINTMENT (OUTPATIENT)
Dept: CT IMAGING | Age: 79
End: 2024-01-15
Payer: MEDICARE

## 2024-01-15 ENCOUNTER — ANESTHESIA (OUTPATIENT)
Dept: OPERATING ROOM | Age: 79
End: 2024-01-15
Payer: MEDICARE

## 2024-01-15 ENCOUNTER — APPOINTMENT (OUTPATIENT)
Dept: GENERAL RADIOLOGY | Age: 79
End: 2024-01-15
Attending: STUDENT IN AN ORGANIZED HEALTH CARE EDUCATION/TRAINING PROGRAM
Payer: MEDICARE

## 2024-01-15 LAB
ANION GAP SERPL CALCULATED.3IONS-SCNC: 17 MMOL/L (ref 7–16)
ANION GAP SERPL CALCULATED.3IONS-SCNC: 17 MMOL/L (ref 7–16)
ANION GAP SERPL CALCULATED.3IONS-SCNC: 18 MMOL/L (ref 7–16)
ANION GAP SERPL CALCULATED.3IONS-SCNC: 20 MMOL/L (ref 7–16)
BASOPHILS ABSOLUTE: 0 K/CU MM
BASOPHILS RELATIVE PERCENT: 0.1 % (ref 0–1)
BUN SERPL-MCNC: 42 MG/DL (ref 6–23)
BUN SERPL-MCNC: 47 MG/DL (ref 6–23)
BUN SERPL-MCNC: 48 MG/DL (ref 6–23)
BUN SERPL-MCNC: 80 MG/DL (ref 6–23)
CALCIUM IONIZED: 4.52 MG/DL (ref 4.48–5.28)
CALCIUM SERPL-MCNC: 8.9 MG/DL (ref 8.3–10.6)
CALCIUM SERPL-MCNC: 9.2 MG/DL (ref 8.3–10.6)
CALCIUM SERPL-MCNC: 9.4 MG/DL (ref 8.3–10.6)
CALCIUM SERPL-MCNC: 9.5 MG/DL (ref 8.3–10.6)
CHLORIDE BLD-SCNC: 95 MMOL/L (ref 99–110)
CHLORIDE BLD-SCNC: 95 MMOL/L (ref 99–110)
CHLORIDE BLD-SCNC: 96 MMOL/L (ref 99–110)
CHLORIDE BLD-SCNC: 97 MMOL/L (ref 99–110)
CHOLEST SERPL-MCNC: 172 MG/DL
CO2: 19 MMOL/L (ref 21–32)
CO2: 22 MMOL/L (ref 21–32)
CO2: 24 MMOL/L (ref 21–32)
CO2: 25 MMOL/L (ref 21–32)
CREAT SERPL-MCNC: 5.2 MG/DL (ref 0.9–1.3)
CREAT SERPL-MCNC: 5.5 MG/DL (ref 0.9–1.3)
CREAT SERPL-MCNC: 5.6 MG/DL (ref 0.9–1.3)
CREAT SERPL-MCNC: 8.1 MG/DL (ref 0.9–1.3)
DIFFERENTIAL TYPE: ABNORMAL
EOSINOPHILS ABSOLUTE: 0 K/CU MM
EOSINOPHILS RELATIVE PERCENT: 0 % (ref 0–3)
ESTIMATED AVERAGE GLUCOSE: 77 MG/DL
GFR SERPL CREATININE-BSD FRML MDRD: 10 ML/MIN/1.73M2
GFR SERPL CREATININE-BSD FRML MDRD: 10 ML/MIN/1.73M2
GFR SERPL CREATININE-BSD FRML MDRD: 11 ML/MIN/1.73M2
GFR SERPL CREATININE-BSD FRML MDRD: 6 ML/MIN/1.73M2
GLUCOSE BLD-MCNC: 102 MG/DL (ref 70–99)
GLUCOSE BLD-MCNC: 105 MG/DL (ref 70–99)
GLUCOSE BLD-MCNC: 111 MG/DL (ref 70–99)
GLUCOSE BLD-MCNC: 124 MG/DL (ref 70–99)
GLUCOSE SERPL-MCNC: 107 MG/DL (ref 70–99)
GLUCOSE SERPL-MCNC: 115 MG/DL (ref 70–99)
GLUCOSE SERPL-MCNC: 120 MG/DL (ref 70–99)
GLUCOSE SERPL-MCNC: 120 MG/DL (ref 70–99)
HBA1C MFR BLD: 4.3 % (ref 4.2–6.3)
HCT VFR BLD CALC: 33.8 % (ref 42–52)
HDLC SERPL-MCNC: 51 MG/DL
HEMOGLOBIN: 11 GM/DL (ref 13.5–18)
IMMATURE NEUTROPHIL %: 0.4 % (ref 0–0.43)
IONIZED CA: 1.13 MMOL/L (ref 1.12–1.32)
LDLC SERPL CALC-MCNC: 106 MG/DL
LYMPHOCYTES ABSOLUTE: 0.8 K/CU MM
LYMPHOCYTES RELATIVE PERCENT: 6.8 % (ref 24–44)
MAGNESIUM: 1.9 MG/DL (ref 1.8–2.4)
MCH RBC QN AUTO: 30.7 PG (ref 27–31)
MCHC RBC AUTO-ENTMCNC: 32.5 % (ref 32–36)
MCV RBC AUTO: 94.4 FL (ref 78–100)
MONOCYTES ABSOLUTE: 0.2 K/CU MM
MONOCYTES RELATIVE PERCENT: 1.6 % (ref 0–4)
NUCLEATED RBC %: 0 %
PDW BLD-RTO: 13.2 % (ref 11.7–14.9)
PHOSPHORUS: 5.8 MG/DL (ref 2.5–4.9)
PLATELET # BLD: 262 K/CU MM (ref 140–440)
PMV BLD AUTO: 9.4 FL (ref 7.5–11.1)
POTASSIUM SERPL-SCNC: 4.1 MMOL/L (ref 3.5–5.1)
POTASSIUM SERPL-SCNC: 4.2 MMOL/L (ref 3.5–5.1)
POTASSIUM SERPL-SCNC: 4.5 MMOL/L (ref 3.5–5.1)
POTASSIUM SERPL-SCNC: 5 MMOL/L (ref 3.5–5.1)
RBC # BLD: 3.58 M/CU MM (ref 4.6–6.2)
SEGMENTED NEUTROPHILS ABSOLUTE COUNT: 10.3 K/CU MM
SEGMENTED NEUTROPHILS RELATIVE PERCENT: 91.1 % (ref 36–66)
SODIUM BLD-SCNC: 135 MMOL/L (ref 135–145)
SODIUM BLD-SCNC: 136 MMOL/L (ref 135–145)
SODIUM BLD-SCNC: 137 MMOL/L (ref 135–145)
SODIUM BLD-SCNC: 137 MMOL/L (ref 135–145)
TOTAL IMMATURE NEUTOROPHIL: 0.05 K/CU MM
TOTAL NUCLEATED RBC: 0 K/CU MM
TRIGL SERPL-MCNC: 73 MG/DL
WBC # BLD: 11.3 K/CU MM (ref 4–10.5)

## 2024-01-15 PROCEDURE — 2580000003 HC RX 258: Performed by: STUDENT IN AN ORGANIZED HEALTH CARE EDUCATION/TRAINING PROGRAM

## 2024-01-15 PROCEDURE — 88341 IMHCHEM/IMCYTCHM EA ADD ANTB: CPT | Performed by: PATHOLOGY

## 2024-01-15 PROCEDURE — 70450 CT HEAD/BRAIN W/O DYE: CPT

## 2024-01-15 PROCEDURE — 71250 CT THORAX DX C-: CPT

## 2024-01-15 PROCEDURE — 84100 ASSAY OF PHOSPHORUS: CPT

## 2024-01-15 PROCEDURE — 82962 GLUCOSE BLOOD TEST: CPT

## 2024-01-15 PROCEDURE — 6370000000 HC RX 637 (ALT 250 FOR IP): Performed by: NEUROLOGICAL SURGERY

## 2024-01-15 PROCEDURE — 92610 EVALUATE SWALLOWING FUNCTION: CPT

## 2024-01-15 PROCEDURE — 3600000005 HC SURGERY LEVEL 5 BASE: Performed by: NEUROLOGICAL SURGERY

## 2024-01-15 PROCEDURE — 36556 INSERT NON-TUNNEL CV CATH: CPT

## 2024-01-15 PROCEDURE — 6360000002 HC RX W HCPCS: Performed by: PHYSICIAN ASSISTANT

## 2024-01-15 PROCEDURE — C9113 INJ PANTOPRAZOLE SODIUM, VIA: HCPCS | Performed by: PHYSICIAN ASSISTANT

## 2024-01-15 PROCEDURE — 36592 COLLECT BLOOD FROM PICC: CPT

## 2024-01-15 PROCEDURE — C1713 ANCHOR/SCREW BN/BN,TIS/BN: HCPCS | Performed by: NEUROLOGICAL SURGERY

## 2024-01-15 PROCEDURE — 85025 COMPLETE CBC W/AUTO DIFF WBC: CPT

## 2024-01-15 PROCEDURE — 80048 BASIC METABOLIC PNL TOTAL CA: CPT

## 2024-01-15 PROCEDURE — 2709999900 HC NON-CHARGEABLE SUPPLY: Performed by: NEUROLOGICAL SURGERY

## 2024-01-15 PROCEDURE — 2580000003 HC RX 258: Performed by: PHYSICIAN ASSISTANT

## 2024-01-15 PROCEDURE — 6370000000 HC RX 637 (ALT 250 FOR IP): Performed by: PHYSICIAN ASSISTANT

## 2024-01-15 PROCEDURE — 90935 HEMODIALYSIS ONE EVALUATION: CPT

## 2024-01-15 PROCEDURE — 2000000000 HC ICU R&B

## 2024-01-15 PROCEDURE — 2720000010 HC SURG SUPPLY STERILE: Performed by: NEUROLOGICAL SURGERY

## 2024-01-15 PROCEDURE — 99232 SBSQ HOSP IP/OBS MODERATE 35: CPT | Performed by: INTERNAL MEDICINE

## 2024-01-15 PROCEDURE — 5A1D70Z PERFORMANCE OF URINARY FILTRATION, INTERMITTENT, LESS THAN 6 HOURS PER DAY: ICD-10-PCS | Performed by: INTERNAL MEDICINE

## 2024-01-15 PROCEDURE — 83735 ASSAY OF MAGNESIUM: CPT

## 2024-01-15 PROCEDURE — 88307 TISSUE EXAM BY PATHOLOGIST: CPT | Performed by: PATHOLOGY

## 2024-01-15 PROCEDURE — 74176 CT ABD & PELVIS W/O CONTRAST: CPT

## 2024-01-15 PROCEDURE — 00B00ZZ EXCISION OF BRAIN, OPEN APPROACH: ICD-10-PCS | Performed by: NEUROLOGICAL SURGERY

## 2024-01-15 PROCEDURE — 80061 LIPID PANEL: CPT

## 2024-01-15 PROCEDURE — 2500000003 HC RX 250 WO HCPCS: Performed by: ANESTHESIOLOGY

## 2024-01-15 PROCEDURE — C1751 CATH, INF, PER/CENT/MIDLINE: HCPCS

## 2024-01-15 PROCEDURE — 83036 HEMOGLOBIN GLYCOSYLATED A1C: CPT

## 2024-01-15 PROCEDURE — 6360000002 HC RX W HCPCS: Performed by: ANESTHESIOLOGY

## 2024-01-15 PROCEDURE — 0NB10ZZ EXCISION OF FRONTAL BONE, OPEN APPROACH: ICD-10-PCS | Performed by: NEUROLOGICAL SURGERY

## 2024-01-15 PROCEDURE — 6360000002 HC RX W HCPCS: Performed by: STUDENT IN AN ORGANIZED HEALTH CARE EDUCATION/TRAINING PROGRAM

## 2024-01-15 PROCEDURE — 3700000000 HC ANESTHESIA ATTENDED CARE: Performed by: NEUROLOGICAL SURGERY

## 2024-01-15 PROCEDURE — C1763 CONN TISS, NON-HUMAN: HCPCS | Performed by: NEUROLOGICAL SURGERY

## 2024-01-15 PROCEDURE — 6360000002 HC RX W HCPCS: Performed by: EMERGENCY MEDICINE

## 2024-01-15 PROCEDURE — 3600000015 HC SURGERY LEVEL 5 ADDTL 15MIN: Performed by: NEUROLOGICAL SURGERY

## 2024-01-15 PROCEDURE — 3700000001 HC ADD 15 MINUTES (ANESTHESIA): Performed by: NEUROLOGICAL SURGERY

## 2024-01-15 PROCEDURE — 71045 X-RAY EXAM CHEST 1 VIEW: CPT

## 2024-01-15 PROCEDURE — 0WC10ZZ EXTIRPATION OF MATTER FROM CRANIAL CAVITY, OPEN APPROACH: ICD-10-PCS | Performed by: NEUROLOGICAL SURGERY

## 2024-01-15 PROCEDURE — 94761 N-INVAS EAR/PLS OXIMETRY MLT: CPT

## 2024-01-15 PROCEDURE — 88342 IMHCHEM/IMCYTCHM 1ST ANTB: CPT | Performed by: PATHOLOGY

## 2024-01-15 PROCEDURE — 99232 SBSQ HOSP IP/OBS MODERATE 35: CPT | Performed by: PHYSICIAN ASSISTANT

## 2024-01-15 PROCEDURE — 82330 ASSAY OF CALCIUM: CPT

## 2024-01-15 PROCEDURE — 2500000003 HC RX 250 WO HCPCS: Performed by: NEUROLOGICAL SURGERY

## 2024-01-15 PROCEDURE — 2780000010 HC IMPLANT OTHER: Performed by: NEUROLOGICAL SURGERY

## 2024-01-15 PROCEDURE — 02H633Z INSERTION OF INFUSION DEVICE INTO RIGHT ATRIUM, PERCUTANEOUS APPROACH: ICD-10-PCS | Performed by: STUDENT IN AN ORGANIZED HEALTH CARE EDUCATION/TRAINING PROGRAM

## 2024-01-15 DEVICE — IMPLANTABLE DEVICE: Type: IMPLANTABLE DEVICE | Site: BRAIN | Status: FUNCTIONAL

## 2024-01-15 DEVICE — COVER BUR H DIA24MM 6 H CRANIOMAXILLOFACIAL BLU TI: Type: IMPLANTABLE DEVICE | Site: SKULL | Status: FUNCTIONAL

## 2024-01-15 RX ORDER — LIDOCAINE HYDROCHLORIDE AND EPINEPHRINE 10; 10 MG/ML; UG/ML
INJECTION, SOLUTION INFILTRATION; PERINEURAL
Status: COMPLETED | OUTPATIENT
Start: 2024-01-15 | End: 2024-01-15

## 2024-01-15 RX ORDER — 3% SODIUM CHLORIDE 3 G/100ML
50 INJECTION, SOLUTION INTRAVENOUS CONTINUOUS
Status: DISCONTINUED | OUTPATIENT
Start: 2024-01-15 | End: 2024-01-16

## 2024-01-15 RX ORDER — IPRATROPIUM BROMIDE AND ALBUTEROL SULFATE 2.5; .5 MG/3ML; MG/3ML
1 SOLUTION RESPIRATORY (INHALATION)
Status: DISCONTINUED | OUTPATIENT
Start: 2024-01-16 | End: 2024-01-16

## 2024-01-15 RX ORDER — ROCURONIUM BROMIDE 10 MG/ML
INJECTION, SOLUTION INTRAVENOUS PRN
Status: DISCONTINUED | OUTPATIENT
Start: 2024-01-15 | End: 2024-01-15 | Stop reason: SDUPTHER

## 2024-01-15 RX ORDER — GINSENG 100 MG
CAPSULE ORAL
Status: COMPLETED | OUTPATIENT
Start: 2024-01-15 | End: 2024-01-15

## 2024-01-15 RX ORDER — SENNA AND DOCUSATE SODIUM 50; 8.6 MG/1; MG/1
1 TABLET, FILM COATED ORAL 2 TIMES DAILY
Status: DISCONTINUED | OUTPATIENT
Start: 2024-01-16 | End: 2024-01-19 | Stop reason: HOSPADM

## 2024-01-15 RX ORDER — PROPOFOL 10 MG/ML
INJECTION, EMULSION INTRAVENOUS PRN
Status: DISCONTINUED | OUTPATIENT
Start: 2024-01-15 | End: 2024-01-15 | Stop reason: SDUPTHER

## 2024-01-15 RX ORDER — ONDANSETRON 2 MG/ML
INJECTION INTRAMUSCULAR; INTRAVENOUS PRN
Status: DISCONTINUED | OUTPATIENT
Start: 2024-01-15 | End: 2024-01-15 | Stop reason: SDUPTHER

## 2024-01-15 RX ORDER — FENTANYL CITRATE 50 UG/ML
INJECTION, SOLUTION INTRAMUSCULAR; INTRAVENOUS PRN
Status: DISCONTINUED | OUTPATIENT
Start: 2024-01-15 | End: 2024-01-15 | Stop reason: SDUPTHER

## 2024-01-15 RX ADMIN — DEXAMETHASONE SODIUM PHOSPHATE 6 MG: 10 INJECTION INTRAMUSCULAR; INTRAVENOUS at 09:58

## 2024-01-15 RX ADMIN — PANTOPRAZOLE SODIUM 40 MG: 40 INJECTION, POWDER, FOR SOLUTION INTRAVENOUS at 09:58

## 2024-01-15 RX ADMIN — MEROPENEM 500 MG: 500 INJECTION, POWDER, FOR SOLUTION INTRAVENOUS at 18:15

## 2024-01-15 RX ADMIN — LEVETIRACETAM 500 MG: 100 INJECTION, SOLUTION INTRAVENOUS at 21:10

## 2024-01-15 RX ADMIN — SEVELAMER CARBONATE 800 MG: 800 TABLET, FILM COATED ORAL at 09:58

## 2024-01-15 RX ADMIN — CEFAZOLIN 2000 MG: 2 INJECTION, POWDER, FOR SOLUTION INTRAMUSCULAR; INTRAVENOUS at 21:01

## 2024-01-15 RX ADMIN — ONDANSETRON 4 MG: 2 INJECTION INTRAMUSCULAR; INTRAVENOUS at 22:10

## 2024-01-15 RX ADMIN — SEVELAMER CARBONATE 800 MG: 800 TABLET, FILM COATED ORAL at 16:25

## 2024-01-15 RX ADMIN — SODIUM CHLORIDE, PRESERVATIVE FREE 10 ML: 5 INJECTION INTRAVENOUS at 09:58

## 2024-01-15 RX ADMIN — SODIUM CHLORIDE, PRESERVATIVE FREE 10 ML: 5 INJECTION INTRAVENOUS at 09:59

## 2024-01-15 RX ADMIN — DEXAMETHASONE SODIUM PHOSPHATE 6 MG: 10 INJECTION INTRAMUSCULAR; INTRAVENOUS at 04:28

## 2024-01-15 RX ADMIN — FENTANYL CITRATE 100 MCG: 50 INJECTION, SOLUTION INTRAMUSCULAR; INTRAVENOUS at 20:56

## 2024-01-15 RX ADMIN — PHENYLEPHRINE HYDROCHLORIDE 50 MCG: 10 INJECTION INTRAVENOUS at 21:08

## 2024-01-15 RX ADMIN — ROCURONIUM BROMIDE 50 MG: 10 INJECTION, SOLUTION INTRAVENOUS at 20:56

## 2024-01-15 RX ADMIN — PHENYLEPHRINE HYDROCHLORIDE 50 MCG: 10 INJECTION INTRAVENOUS at 21:27

## 2024-01-15 RX ADMIN — ISOSORBIDE MONONITRATE 30 MG: 30 TABLET, EXTENDED RELEASE ORAL at 10:03

## 2024-01-15 RX ADMIN — SUGAMMADEX 200 MG: 100 INJECTION, SOLUTION INTRAVENOUS at 22:40

## 2024-01-15 RX ADMIN — PHENYLEPHRINE HYDROCHLORIDE 100 MCG: 10 INJECTION INTRAVENOUS at 21:48

## 2024-01-15 RX ADMIN — DEXAMETHASONE SODIUM PHOSPHATE 6 MG: 10 INJECTION INTRAMUSCULAR; INTRAVENOUS at 21:09

## 2024-01-15 RX ADMIN — SODIUM CHLORIDE 25 ML/HR: 3 INJECTION, SOLUTION INTRAVENOUS at 18:59

## 2024-01-15 RX ADMIN — SODIUM CHLORIDE, PRESERVATIVE FREE 10 ML: 5 INJECTION INTRAVENOUS at 23:30

## 2024-01-15 RX ADMIN — CARVEDILOL 3.12 MG: 6.25 TABLET, FILM COATED ORAL at 09:58

## 2024-01-15 RX ADMIN — SEVELAMER CARBONATE 800 MG: 800 TABLET, FILM COATED ORAL at 12:16

## 2024-01-15 RX ADMIN — PROPOFOL 80 MG: 10 INJECTION, EMULSION INTRAVENOUS at 20:56

## 2024-01-15 RX ADMIN — ROCURONIUM BROMIDE 10 MG: 10 INJECTION, SOLUTION INTRAVENOUS at 21:33

## 2024-01-15 RX ADMIN — MEGESTROL ACETATE 40 MG: 20 TABLET ORAL at 09:58

## 2024-01-15 RX ADMIN — DEXAMETHASONE SODIUM PHOSPHATE 6 MG: 10 INJECTION INTRAMUSCULAR; INTRAVENOUS at 16:24

## 2024-01-15 RX ADMIN — ROCURONIUM BROMIDE 10 MG: 10 INJECTION, SOLUTION INTRAVENOUS at 21:51

## 2024-01-15 RX ADMIN — LEVETIRACETAM 500 MG: 100 INJECTION, SOLUTION INTRAVENOUS at 09:57

## 2024-01-15 ASSESSMENT — PAIN SCALES - GENERAL
PAINLEVEL_OUTOF10: 0
PAINLEVEL_OUTOF10: 0

## 2024-01-15 NOTE — CONSULTS
V2.0  History and Physical      Name:  Christiano Miller /Age/Sex: 1945  (78 y.o. male)   MRN & CSN:  [id number] & [id number] Encounter Date/Time: 1/15/2024 1:20 PM EST   Location:  -A PCP: Musa Corral MD       Hospital Day: 2    Assessment and Plan:   Christiano Miller is a 78 y.o. male with a pmh of ESRD, BPH, history of ESBL UTI, malignant melanoma with mets to lung.  Sigmoid colon and brain, severe diverticulosis who presents with Metastasis to brain (HCC)    Hospital Problems             Last Modified POA    * (Principal) Metastasis to brain (HCC) 2024 Yes    Fall 2024 Yes       Primary malignant melanoma  With multiple hemorrhagic hyperintense lesions bilaterally, largest 3.3X 3.1 cm.   MRI was also completed at the time of admission  CT head was repeated 1/15/2020 4 AM which shows slight worsening of the largest lesion measuring 3.6 x 3.5 cm with surrounding vasogenic edema  Has been started on Keppra and steroids  Oncology, radiation oncology and neurosurgery on board  Management pending family's decision on goals of care    Mechanical fall  Initial encounter, likely due to above    Primary malignant melanoma  Stage IIa, right side of the back s/p wide excision by Dr. Potter on  , currently on chemotherapy  Lung metastasis versus primary malignancy s/p EBUS at [facility] in 2022  And possible mass in the sigmoid colon causing upstream colonic distention    History of ESRD on HD    History of ESBL positive UTI in 2023  Started on antibiotics here      Disposition:   Current Living situation:   Expected Disposition:   Estimated D/C:     Diet ADULT DIET; Dysphagia - Soft and Bite Sized; Low Sodium (2 gm); Low Potassium (Less than 3000 mg/day); Low Phosphorus (Less than 1000 mg); Moderately Thick (Honey)   DVT Prophylaxis [] Lovenox, []  Heparin, [] SCDs, [] Ambulation,  [] Eliquis, [] Xarelto, [] Coumadin   Code Status Full Code   Surrogate Decision Maker/  SYSTEM PROVIDED HISTORY: fall, pain TECHNOLOGIST PROVIDED HISTORY: Reason for exam:->fall, pain FINDINGS: The alignment of the sacrum and coccyx is within normal limits.  No acute fractures are seen.  There is no diastases of the sacroiliac joints or pubic symphysis.  Vascular calcifications are seen.     1. No acute abnormality involving the sacrum or coccyx.     XR CHEST (2 VW)    Result Date: 1/14/2024  EXAMINATION: TWO XRAY VIEWS OF THE CHEST 1/14/2024 7:55 am COMPARISON: 12/25/2022. HISTORY: ORDERING SYSTEM PROVIDED HISTORY: cough TECHNOLOGIST PROVIDED HISTORY: Reason for exam:->cough FINDINGS: The heart size and pulmonary vasculature are stable.  There are calcified lymph nodes and granulomas.  No acute infiltrates are seen.  No pneumothoraces are seen.     No acute cardiopulmonary process.     CT HEAD WO CONTRAST    Result Date: 1/14/2024  EXAMINATION: CT OF THE HEAD WITHOUT CONTRAST  1/14/2024 8:12 am TECHNIQUE: CT of the head was performed without the administration of intravenous contrast. Automated exposure control, iterative reconstruction, and/or weight based adjustment of the mA/kV was utilized to reduce the radiation dose to as low as reasonably achievable. COMPARISON: None. HISTORY: ORDERING SYSTEM PROVIDED HISTORY: HEAD TRAUMA, CLOSED, MILD, GCS >= 13, NO RISK FACTORS, NEURO EXAM NORMAL TECHNOLOGIST PROVIDED HISTORY: Has a \"code stroke\" or \"stroke alert\" been called?->No Reason for exam:->fall, confused Decision Support Exception - unselect if not a suspected or confirmed emergency medical condition->Emergency Medical Condition (MA) Reason for Exam: fall, confused; Head trauma, closed, mild, GCS >= 13, no risk factors, neuro exam normal FINDINGS: BRAIN/VENTRICLES: There is a mixed density hyperdense mass involving the left temporal lobe measuring 2.1 x 1.5 cm.  There is surrounding low attenuation. There is a larger hyperdense mass in the high right frontal lobe measuring 3.3 x 3.1 cm with

## 2024-01-15 NOTE — PROGRESS NOTES
Comprehensive Nutrition Assessment    Type and Reason for Visit:  Initial (low BMI)    Nutrition Recommendations/Plan:   Continue current diet texture/consistency; liberalize Na and K restrictions to promote po intakes  Offer frozen oral nutrition supplement BID  Please encourage and document po intakes at all meals  Monitor weights, intakes, labs, POC     Malnutrition Assessment:  Malnutrition Status:  Insufficient data (01/15/24 5045)    Context:  Chronic Illness       Nutrition Assessment:    Admitted w/ metastasis to brain, s/p fall at home, past medical history of ESRD, BPH, recent history of ESBL E. coli UTI primary malignant melanoma with mets to the lung, sigmoid colon, and brain, severe diverticulosis without diverticulitis. Pt sleeping soundly at visit, not appropriate for interview at this time per RN. No po intakes documented. Downgraded to S&B diet w/ honey thick liquids per SLP today. Will offer frozen oral supp BID. Will liberalize Na and K restrictions, as levels WNL at this time; will increase food options on already-limited diet, hopefully promote po intakes. Low BMI for age, but wt appears stable over the past 9mo per chart review. Follow at high nutrition risk.    Nutrition Related Findings:    +renvela, megace; A1C 4.3, GFR 6, Cr 8.1, BUN 80, POC glucose 111-163 Wound Type: None       Current Nutrition Intake & Therapies:    Average Meal Intake: Unable to assess  Average Supplements Intake: None Ordered  ADULT DIET; Dysphagia - Soft and Bite Sized; Low Sodium (2 gm); Low Potassium (Less than 3000 mg/day); Low Phosphorus (Less than 1000 mg); Moderately Thick (Honey)    Anthropometric Measures:  Height: 172.7 cm (5' 8\")  Ideal Body Weight (IBW): 154 lbs (70 kg)    Admission Body Weight: 57 kg (125 lb 10.6 oz)  Current Body Weight: 57 kg (125 lb 10.6 oz),   IBW. Weight Source: Bed Scale  Current BMI (kg/m2): 19.1  Usual Body Weight: 57.6 kg (126 lb 15.8 oz) (4/17/23)  % Weight Change (Calculated):

## 2024-01-15 NOTE — PROGRESS NOTES
Inpatient Progress Note 1/15/2024        Christiano Miller  1945  [id number]      Assessment/Plan:    Encephalopathy  Multiple brain metastases (some hemorrhagic) secondary to metastatic malignant melanoma  History of malignant melanoma (prior wide excision back lesion 6/21, chemotherapy, pulmonary metastases established 11/22 via bronchoscopy (EBUS)  ESRD  Question of colon mass  History of prostatic hypertrophy bladder outlet obstruction  History of ESBL E. coli urinary tract infection, 9/23  Protein calorie malnutrition, cachexia      Systemic steroid  Evaluation for radiation therapy to brain  Dialytic support  Empiric Keppra per neurosurgical service  Ulcer, DVT prophylaxis    Very poor prognosis, discussed and reviewed with oncology service    In the absence of any other clinical concerns, the patient can be transferred to ICU setting.    Complex decisions required for evaluation management reviewed during critical care rounds    E Ronnie  [phone]    Subjective:    No acute overnight issues reported per discussion with nursing staff.  The patient does arouse however he is very hearing impaired, delayed responses to questioning.      Review of Systems     Unable to obtain in light of current clinical circumstances    Physical Exam:           BP (!) 152/61   Pulse 69   Temp 97.6 °F (36.4 °C) (Oral)   Resp 14   Ht 1.727 m (5' 8\")   Wt 57 kg (125 lb 10.6 oz) Comment: with rainbow sheet  SpO2 95%   BMI 19.11 kg/m²       General: Chronically ill-appearing male, arouses, no distress vitals reviewed  Eyes: Pupils and motor are intact  ENT: Head normal.  Hearing impaired.  Moist oral mucosa.  Neck supple  Cardiovascular: Regular rate.  Respiratory: Clear to auscultation  Gastrointestinal: Soft, non tender  Genitourinary: no suprapubic tenderness  Musculoskeletal: Left upper extremity AV fistula.  Skin: warm, dry, absence of rash.  Neuro: Arousable, follows some simple commands, no obvious focal

## 2024-01-15 NOTE — CARE COORDINATION
CM in to see pt. ST at bedside working with pt. Dgt present and provided information for assessment. Pt has lived alone and been indep PTA. Pt drove self to dialysis last week.  Dgt states that plan at this time will be for pt to go to his ex-wife's home. Ex wife's home is 1 floor with bedroom with bathroom connected. Ex wife works outside the home but dgt and son do not and they plan to take shifts caring for pt. In addition to children; Barbara and Jaylon, listed on chart pt also has a dgt, Karen, who works from home and will assist as well. Dgt is aware that hospice has been discussed on some level with pt although he did not previously share this with his family. Cm did point out that if hospice care is needed or the route pt would like to go that ongoing dialysis is not a part of the hospice philosophy as all life sustaining treatments are withdrawn and the focus is on comfort and quality of life. Cm to follow.   01/15/24 1120   Service Assessment   Patient Orientation Alert and Oriented;Person;Place   Cognition Alert   History Provided By Child/Family;Medical Record   Primary Caregiver Self   Accompanied By/Relationship Dgt   Support Systems Children;Other (Comment)  (ex spouse)   Patient's Healthcare Decision Maker is: Legal Next of Kin   PCP Verified by CM Yes   Last Visit to PCP   (unknown)   Prior Functional Level Independent in ADLs/IADLs   Current Functional Level Assistance with the following:;Bathing;Toileting;Mobility   Can patient return to prior living arrangement Unknown at present   Ability to make needs known: Fair   Family able to assist with home care needs: Yes   Would you like for me to discuss the discharge plan with any other family members/significant others, and if so, who? Yes  (emergency contact/family as needed)   Financial Resources Medicare;Medicaid   Community Resources None   CM/SW Referral Other (see comment)  (discharge planning assessment)   Social/Functional History   Lives

## 2024-01-15 NOTE — PROGRESS NOTES
Tolerated 2.5 hour dialysis treatment well, removed 1 liter.  Left AVF cannulated with 16 gauge dialysis needles.      Patient Name: Christiano Miller  Patient : 1945  MRN: [id number]     Acct: [id number]  Date of Admission: 2024  Room/Bed: -A  Code Status:  Full Code  Allergies: No Known Allergies  Diagnosis:    Patient Active Problem List   Diagnosis    Chronic kidney disease (CKD), stage IV (severe) (HCC)    Right inguinal hernia    Urinary tract infection due to extended-spectrum beta lactamase (ESBL) producing Escherichia coli    Hemodialysis access site with mature fistula (HCC)    Malignant melanoma of right upper extremity including shoulder (HCC)    Lung mass    Acute respiratory failure (HCC)    Moderate malnutrition (HCC)    Chronic kidney disease (CKD), stage V (HCC) [270933]    Protein calorie malnutrition    History of colon polyps    Metastasis to brain (HCC)    Fall         Treatment:  Hemodialysis 1:1  Priority: Routine  Location: ICU    Diabetic: Yes  NPO: No  Isolation Precautions: Contact     Consent for Treatment Verified: Yes  Blood Consent Verified: Not Applicable     Safety Verified: Identify (I), Consent (C), Equipment (E), HepB Status (B), Orders Complete (O), Access Verified (A), and Timeliness (T)  Time out performed prior to access at 1010 hours.    Report Received from Primary RN at 0930 hours.  Primary RN (First Initial, Last Name, Title): JOSEPH Sutton RN  Incapacitated Nurse Education Completed: Not Applicable     HBsAg ONLY:  Date Drawn: 2023       Results: Negative  HBsAb:  Date Drawn:  2023       Results: Immune >10    Order  Dialysis Bath  K+ (Potassium): 2  Ca+ (Calcium): 2.5  Na+ (Sodium): 140  HCO3 (Bicarb): 35  Bicarbonate Concentrate Lot No.: [id number]  Acid Concentrate Lot No.: [id number]     Na+ Modeling: Not Applicable  Dialyzer: F180  Dialysate Temperature (C):  35  Blood Flow Rate (BFR):  300   Dialysate Flow Rate (DFR):   600

## 2024-01-15 NOTE — PROGRESS NOTES
Neurosurgery Progress Note  1/15/2024 11:05 AM      Assessment and Plan:   Intracranial hemorrhagic mets- 5 noted on CT head and mri. Patient with history of metastatic melanoma in the past to lungs (diagnosed 2022), underwent chemo, had adverse effects and stopped therapy. He did not want radiation either. This morning right frontal hemorrhagic met with slight expansion. Patient is more weak in the left arm and leg today compared to yesterday per nursing staff. He is more slow to respond and speech is more dysarthric. Dr. Neely made aware. CT chest/abd/pelvis ordered to evaluate extent of melanoma disease. If minimal disease elsewhere then can consider craniotomy for resection of right frontal lobe mass. Continue with keppra 500mg BID and decadron 6mg q 6 hours. Oncology and Rad onc. following.  Family coming later. Recommend discussion of goals of care.   ESRD- has been on dialysis for 8 years.   ESBL positive E Coli -under contact precautions. On meropenem    Supervising physician: Saray Neely MD.  Dr. Neely was readily and continuously available by phone for direct consultation regarding the care of this patient.    Dragon dictation may have been used in the writing of this note. Spelling or word errors may have occurred. Please call for clarification if there are concerns.      Subjective:   Admit Date: 1/14/2024  PCP: Musa Corral MD    Patient sitting up in bed. Talking on phone slowly with daughter. I was able to speak with the daughter as well. She states that prior to admission he was fully functioning and was driving himself to dialysis. She is aware of his metastatic disease and wants whatever the patient wants in terms of treatment. Ideally she would like to see him comfortable. She is coming in later with other family to see him.     Data:   Scheduled Meds:   meropenem  500 mg IntraVENous Q24H    levETIRAcetam  500 mg IntraVENous Q12H    sodium chloride flush  5-40 mL IntraVENous 2 times per day  extremities   Extremities: right upper extremity 5/5 throughout, left upper extremity 4/5 throughout, right lower extremity 5/5 throughout, left lower extremity 4/5 throughout  Incision: none  Drains: none      Electronically signed by Venus Mcdowell PA-C on 1/15/2024 at 11:05 AM

## 2024-01-15 NOTE — PROGRESS NOTES
ONCOLOGY HEMATOLOGY  PROGRESS NOTE    1/15/2024  8:03 AM    Patient:    Christiano Miller  : 1945   78 y.o.             MRN: [id number]  Admitted: 2024  6:36 AM ATT: Vidhya Hummel MD   -A  AdmitDx: Intracranial mass [R90.0]  Coccyx pain [M53.3]  Confusion [R41.0]  Metastasis to brain (HCC) [C79.31]  Fall, initial encounter [W19.XXXA]  PCP: Musa Corral MD    HISTORY OF PRESENT ILLNESS   Christiano Miller is a 78 y.o. male who was admitted after attaining a fall, unsteady gait and also dysarthria.  Denies any syncopal episode.  Denies any vision changes.  Reported dizziness.  No other focal weakness.  CT scan of the head on admission with multiple hypodense lesions mostly hemorrhagic brain metastasis.  This was followed by MRI of the brain which revealed 5 intracranial metastasis, largest of which is hemorrhagic in the right frontal lobe.  With surrounding edema.  Got a loading dose of steroids followed by maintenance steroids.  Has been evaluated by neurosurgery.      BACKGROUND ONCOLOGY HISTORY:  Patient known to Dr. Lugo, with diagnosis of melanoma, stage IIa in May 2021.  He had wide excision with sentinel lymph node biopsy.  He was under surveillance until 2022 when he was diagnosed with metastatic melanoma.  BRAF could not be performed secondary to insufficient tissue.  He started immunotherapy  and looks like he received until 2023.  He did have response as per PET scan in May 2023.  He then stopped Keytruda secondary to adverse effects.  Refused radiation therapy or BRAF inhibitor.  Repeat CT scan of the chest with interval increase in the size of the medial right upper lobe lung nodule.  In addition there were new large right hilar enlarged lymph nodes.    : CBC with wbc 14, hb 11, plt 261    1/15/2024 awake and alert. No family.  I discuss with him about RT to  the brain.   Seen by nephrologist for kidney disease.    PHYSICAL EXAM       Vitals: BP (!) 140/72   Pulse 70   Temp 98.3 °F (36.8 °C) (Oral)   Resp 16   Ht 1.727 m (5' 8\")   Wt 57 kg (125 lb 10.6 oz) Comment: with rainbow sheet  SpO2 97%   BMI 19.11 kg/m²     CONSTITUTIONAL: alert, awake   EYES: ORTIZ, +o pallor, no icterus  ENT: ATNC  NECK: No JVD  HEMATOLOGIC/LYMPHATIC: no cervical, supraclavicular or axillary lymphadenopathy   LUNGS: CTA bilaterally.  CARDIOVASCULAR: s1s2 rrr no murmurs  ABDOMEN: soft ntnd bs pos  NEUROLOGIC:moving all 4 ext  SKIN: No rash  EXTREMITIES: no LE edema bilaterally    LABORATORY RESULTS  CBC:   Recent Labs     01/14/24  0725 01/15/24  0445   WBC 14.1* 11.3*   HGB 11.0* 11.0*    262     BMP:    Recent Labs     01/14/24  0725 01/15/24  0445    135   K 4.8 5.0   CL 96* 96*   CO2 25 19*   BUN 67* 80*   CREATININE 7.3* 8.1*   GLUCOSE 102* 120*     Hepatic:   Recent Labs     01/14/24  0725   AST 14*   ALT 14   BILITOT 0.4   ALKPHOS 117     INR: No results for input(s): \"INR\" in the last 72 hours.    RADIOLOGY REPORTS  Noted     IMPRESSION & RECOMMENDATIONS:  Met melanoma, history as above, now with brain mets. Is on decadron and prophylactic antiepileptics as suggested by neurosurgery. Discussed the role of XRT. He is not sure how he would like to proceed. Also discussed the philosophy of hospice with him and his son and daughter. Recommend PPI and BG control.  Will call family today and discuss about RT.    Renal failure. Nephro is on board.    Leukocytosis most probably steroid induced.    Continue other medical care    ECOG Performance Status (ECOG Scale 0-4):     This plan was discussed with the patient and  his son and daughter and they verbalized understanding.    We will continue to follow the patient.    Thank you for allowing us to participate in the care of this patient.     Orlando Schmidt

## 2024-01-15 NOTE — PROGRESS NOTES
Nephrology Progress Note        [address], [address]  [place] [address]  Phone: [phone]  Office Hours: 8:30AM - 4:30PM  Monday - Friday        1/15/2024 7:43 AM  Subjective:   Admit Date: 1/14/2024  PCP: Musa Corral MD  Interval History:   On room air  Diet: ADULT DIET; Regular; Low Sodium (2 gm); Low Potassium (Less than 3000 mg/day); Low Phosphorus (Less than 1000 mg)      Data:   Scheduled Meds:   levETIRAcetam  500 mg IntraVENous Q12H    sodium chloride flush  5-40 mL IntraVENous 2 times per day    sodium chloride flush  5-40 mL IntraVENous 2 times per day    pantoprazole  40 mg IntraVENous Daily    dexAMETHasone  6 mg IntraVENous Q6H    carvedilol  3.125 mg Oral BID    isosorbide mononitrate  30 mg Oral Daily    megestrol  40 mg Oral BID    sevelamer  800 mg Oral TID WC    meropenem  1,000 mg IntraVENous Q24H    insulin lispro  0-4 Units SubCUTAneous TID WC    insulin lispro  0-4 Units SubCUTAneous Nightly     Continuous Infusions:   sodium chloride      sodium chloride      dextrose       PRN Meds:sodium chloride flush, sodium chloride, ondansetron **OR** ondansetron, polyethylene glycol, acetaminophen **OR** acetaminophen, sodium chloride flush, sodium chloride, hydrALAZINE, glucose, dextrose bolus **OR** dextrose bolus, glucagon (rDNA), dextrose  I/O last 3 completed shifts:  In: 84 [IV Piggyback:84]  Out: 650 [Urine:650]  No intake/output data recorded.    Intake/Output Summary (Last 24 hours) at 1/15/2024 0743  Last data filed at 1/15/2024 0650  Gross per 24 hour   Intake 83.99 ml   Output 650 ml   Net -566.01 ml       CBC:   Recent Labs     01/14/24  0725 01/15/24  0445   WBC 14.1* 11.3*   HGB 11.0* 11.0*    262       BMP:    Recent Labs     01/14/24  0725 01/15/24  0445    135   K 4.8 5.0   CL 96* 96*   CO2 25 19*   BUN 67* 80*   CREATININE 7.3* 8.1*   GLUCOSE 102* 120*   CALCIUM 9.2 9.2     Hepatic:   Recent Labs     01/14/24  0725   AST 14*   ALT 14   BILITOT

## 2024-01-15 NOTE — PROGRESS NOTES
including shoulder (HCC); Lung mass; Acute respiratory failure (HCC); Moderate malnutrition (HCC); Chronic kidney disease (CKD), stage V (HCC) [998681]; Protein calorie malnutrition; History of colon polyps; Metastasis to brain (HCC); and Fall on their problem list.  ONSET DATE: this admission    Recent Chest Xray/CT of Chest: see chart    Date of Eval: 1/15/2024  Evaluating Therapist: LARISA Momin    Current Diet level:  Current Diet : Regular  Current Liquid Diet : Thin    Primary Complaint  lethargy, difficulty swallowing liquids    Pain:  Pain Assessment  Pain Assessment: None - Denies Pain  Pain Level: 0  Patient's Stated Pain Goal: 0 - No pain  Pain Location: Chest  Pain Orientation: Anterior  Pain Descriptors: Aching  Pain Type: Acute pain  Multiple Pain Sites: No    Reason for Referral  Christiano Miller was referred for a bedside swallow evaluation to assess the efficiency of his swallow function, identify signs and symptoms of aspiration and make recommendations regarding safe dietary consistencies, effective compensatory strategies, and safe eating environment.    Impression  Dysphagia Diagnosis: Mild to moderate oral stage dysphagia;Moderate pharyngeal stage dysphagia;Suspected needs further assessment  Dysphagia Outcome Severity Scale: Level 3: Moderate dysphagia- Total assistance, supervision or strategies. Two or more diet consistencies restricted     Treatment Plan  Requires SLP Intervention: Yes  Duration of Treatment: LOS  D/C Recommendations: Ongoing speech therapy is recommended during this hospitalization;To be determined       Recommended Diet and Intervention  Diet Solids Recommendation: Soft & Bite Sized  Liquid Consistency Recommendation: Moderately Thick (Honey)  Recommended Form of Meds: Meds in puree  Recommendations: Assistance with meals  Therapeutic Interventions: Diet tolerance monitoring;Therapeutic PO trials with SLP;Patient/Family education;Other (comment) (instrumental eval  when appropriate)    Compensatory Swallowing Strategies  Compensatory Swallowing Strategies : Upright as possible for all oral intake;Eat/Feed slowly;Small bites/sips;Assist feed    Treatment/Goals  Short-term Goals  Timeframe for Short-term Goals: LOS  Goal 1: Pt will tolerate soft/bite-sized diet with honey thick liquids with adequate oral manipulation/clearance and no s/s aspiration.  Goal 2: Pt will implement recommended strategies 90% given mod cues  Goal 3: Pt will participate in instrumental swallow evaluation when appropriate.  Goal 4: Pt/caregivers will indicate understanding of education/recommendations.    General  Chart Reviewed: Yes  Behavior/Cognition: Lethargic;Cooperative;Requires cueing  Respiratory Status: Room air  O2 Device: None (Room air)  Communication Observation:  (cognitive communication deficits)  Follows Directions: Simple (with cues)  Dentition: Dentures top  Patient Positioning: Upright in bed  Baseline Vocal Quality: Weak  Volitional Cough: Congested  Prior Dysphagia History: none known prior to admission  Consistencies Administered: Soft and Bite-Sized;Pureed;Moderately Thick - teaspoon;Moderately Thick  - cup;Moderately Thick  - straw;Thin - cup;Thin - teaspoon;Ice Chips    Vision/Hearing       Oral Motor Deficits  Labial: Left droop  Dentition: Upper dentures  Lingual: Other (comment) (reduced ROM)  Velum: No Impairment  Mandible: Restricted  Consistencies Administered: Soft and Bite-Sized;Pureed;Moderately Thick - teaspoon;Moderately Thick  - cup;Moderately Thick  - straw;Thin - cup;Thin - teaspoon;Ice Chips    Oral Phase Dysfunction  Oral Phase  Oral Phase: Exceptions     Indicators of Pharyngeal Phase Dysfunction   Pharyngeal Phase  Pharyngeal Phase: Exceptions  Pharyngeal Phase   Pharyngeal Phase: Exceptions    Prognosis  Prognosis: Fair  Consulted and agree with results and recommendations: Family member;RN;Patient  Family member consulted: daughter    Education  Patient

## 2024-01-15 NOTE — CONSULTS
Patient denies nausea, vomiting, diarrhea, melena, or hematochezia  : Patient denies dysuria, hematuria, or urinary incontinence.  Integumentary: patient denies skin rashes, pruritis, or arm edema  Endocrine:  Patient denies any diabetic or thyroid problems  Neurologic: Patient denies headaches, focal weakness, or disorientation  Psychiatric: The patient denies any depression, anxiety, or rapid mood swings.    PHYSICAL EXAMINATION:   VITAL SIGNS: BP (!) 123/48   Pulse 60   Temp 97.6 °F (36.4 °C) (Oral)   Resp 14   Ht 1.727 m (5' 8\")   Wt 57 kg (125 lb 10.6 oz) Comment: with rainbow sheet  SpO2 95%   BMI 19.11 kg/m²   ECOG PERFORMANCE STATUS: 2-3  PAIN RATIN    GENERAL: Pleasant well-developed adult in no acute distress.  Alert and oriented ×3  SKIN: Warm and dry, without jaundice, ecchymoses, or petechiae.  HEENT: Normocephalic, atraumatic.  Pupils are round and symmetric. Sclerae anicteric  NECK:  No JVD; Supple. No cervical, supraclavicular, or infraclavicular lymphadenopathy is palpable.  LUNGS: Bilaterally clear to auscultation.  No rales, rhonci, or wheezing are present. Good inspiratory effort, no accessory muscle use.   CARDIAC: Regular rate and rhythm, without murmurs, clicks, or gallops   ABDOMEN: Normoactive bowels sounds are present.  Soft. Non-tender and non-distended.  No hepatosplenomegaly or masses are present.  EXTREMITIES: No cyanosis, clubbing or edema is present.  FROM  NEUROLOGIC: cn ii-xii intact grossly    IMPRESSION/PLAN:    Christiano Miller is a 78 y.o. male with metastatic melanoma now with brain metastases who I'm seeing as an inpatient for discussion of radiotherapy treatment options.     MRI brain showed 5 lesions one of which was hemorrhagic and fairly large. Options are radiation therapy versus comfort care/hospice. I discussed whole aleksandar radiation at length. I do not think he would be a good candidate for fractionated SRS because of the size of lesions and his  performance status.  The indications, risks, and benefits of treatment were discussed at length after all questions were answered he expressed understanding my recommendations.    The prescription would be 30Gy/10fx.     I will discuss with Dr. Schmidt. He has been trying to get ahold of family to discuss options/goals of care. Will follow. If he does want to to proceed with whole aleksandar radiation we will need to have him undergo ct simulation before initiating treatments.       Electronically signed by Brian Edward MD on 1/15/2024 at 9:40 AM

## 2024-01-16 DIAGNOSIS — C79.31 METASTASIS TO BRAIN (HCC): Primary | ICD-10-CM

## 2024-01-16 LAB
ANION GAP SERPL CALCULATED.3IONS-SCNC: 17 MMOL/L (ref 7–16)
ANION GAP SERPL CALCULATED.3IONS-SCNC: 18 MMOL/L (ref 7–16)
ANION GAP SERPL CALCULATED.3IONS-SCNC: 19 MMOL/L (ref 7–16)
BASOPHILS ABSOLUTE: 0 K/CU MM
BASOPHILS RELATIVE PERCENT: 0 % (ref 0–1)
BUN SERPL-MCNC: 53 MG/DL (ref 6–23)
BUN SERPL-MCNC: 58 MG/DL (ref 6–23)
BUN SERPL-MCNC: 62 MG/DL (ref 6–23)
CALCIUM IONIZED: 4.36 MG/DL (ref 4.48–5.28)
CALCIUM SERPL-MCNC: 8.6 MG/DL (ref 8.3–10.6)
CALCIUM SERPL-MCNC: 8.6 MG/DL (ref 8.3–10.6)
CALCIUM SERPL-MCNC: 8.9 MG/DL (ref 8.3–10.6)
CHLORIDE BLD-SCNC: 101 MMOL/L (ref 99–110)
CHLORIDE BLD-SCNC: 105 MMOL/L (ref 99–110)
CHLORIDE BLD-SCNC: 109 MMOL/L (ref 99–110)
CO2: 21 MMOL/L (ref 21–32)
CREAT SERPL-MCNC: 5.6 MG/DL (ref 0.9–1.3)
CREAT SERPL-MCNC: 6.2 MG/DL (ref 0.9–1.3)
CREAT SERPL-MCNC: 6.6 MG/DL (ref 0.9–1.3)
DIFFERENTIAL TYPE: ABNORMAL
EOSINOPHILS ABSOLUTE: 0 K/CU MM
EOSINOPHILS RELATIVE PERCENT: 0 % (ref 0–3)
GFR SERPL CREATININE-BSD FRML MDRD: 10 ML/MIN/1.73M2
GFR SERPL CREATININE-BSD FRML MDRD: 8 ML/MIN/1.73M2
GFR SERPL CREATININE-BSD FRML MDRD: 9 ML/MIN/1.73M2
GLUCOSE BLD-MCNC: 121 MG/DL (ref 70–99)
GLUCOSE BLD-MCNC: 125 MG/DL (ref 70–99)
GLUCOSE BLD-MCNC: 126 MG/DL (ref 70–99)
GLUCOSE BLD-MCNC: 143 MG/DL (ref 70–99)
GLUCOSE SERPL-MCNC: 123 MG/DL (ref 70–99)
GLUCOSE SERPL-MCNC: 126 MG/DL (ref 70–99)
GLUCOSE SERPL-MCNC: 129 MG/DL (ref 70–99)
HCT VFR BLD CALC: 30.5 % (ref 42–52)
HEMOGLOBIN: 10 GM/DL (ref 13.5–18)
IMMATURE NEUTROPHIL %: 0.5 % (ref 0–0.43)
IONIZED CA: 1.09 MMOL/L (ref 1.12–1.32)
LYMPHOCYTES ABSOLUTE: 0.5 K/CU MM
LYMPHOCYTES RELATIVE PERCENT: 3.7 % (ref 24–44)
MAGNESIUM: 1.9 MG/DL (ref 1.8–2.4)
MAGNESIUM: 1.9 MG/DL (ref 1.8–2.4)
MAGNESIUM: 2 MG/DL (ref 1.8–2.4)
MCH RBC QN AUTO: 30.6 PG (ref 27–31)
MCHC RBC AUTO-ENTMCNC: 32.8 % (ref 32–36)
MCV RBC AUTO: 93.3 FL (ref 78–100)
MONOCYTES ABSOLUTE: 0.4 K/CU MM
MONOCYTES RELATIVE PERCENT: 3 % (ref 0–4)
NUCLEATED RBC %: 0 %
PDW BLD-RTO: 13.5 % (ref 11.7–14.9)
PHOSPHORUS: 6.2 MG/DL (ref 2.5–4.9)
PHOSPHORUS: 6.5 MG/DL (ref 2.5–4.9)
PHOSPHORUS: 6.8 MG/DL (ref 2.5–4.9)
PLATELET # BLD: 257 K/CU MM (ref 140–440)
PMV BLD AUTO: 9.3 FL (ref 7.5–11.1)
POTASSIUM SERPL-SCNC: 4.4 MMOL/L (ref 3.5–5.1)
POTASSIUM SERPL-SCNC: 4.6 MMOL/L (ref 3.5–5.1)
POTASSIUM SERPL-SCNC: 4.8 MMOL/L (ref 3.5–5.1)
RBC # BLD: 3.27 M/CU MM (ref 4.6–6.2)
SEGMENTED NEUTROPHILS ABSOLUTE COUNT: 11.8 K/CU MM
SEGMENTED NEUTROPHILS RELATIVE PERCENT: 92.8 % (ref 36–66)
SODIUM BLD-SCNC: 141 MMOL/L (ref 135–145)
SODIUM BLD-SCNC: 144 MMOL/L (ref 135–145)
SODIUM BLD-SCNC: 147 MMOL/L (ref 135–145)
TOTAL IMMATURE NEUTOROPHIL: 0.06 K/CU MM
TOTAL NUCLEATED RBC: 0 K/CU MM
WBC # BLD: 12.7 K/CU MM (ref 4–10.5)

## 2024-01-16 PROCEDURE — 97530 THERAPEUTIC ACTIVITIES: CPT

## 2024-01-16 PROCEDURE — 99232 SBSQ HOSP IP/OBS MODERATE 35: CPT | Performed by: INTERNAL MEDICINE

## 2024-01-16 PROCEDURE — 6370000000 HC RX 637 (ALT 250 FOR IP): Performed by: NEUROLOGICAL SURGERY

## 2024-01-16 PROCEDURE — 84100 ASSAY OF PHOSPHORUS: CPT

## 2024-01-16 PROCEDURE — 6360000002 HC RX W HCPCS: Performed by: PHYSICIAN ASSISTANT

## 2024-01-16 PROCEDURE — 6370000000 HC RX 637 (ALT 250 FOR IP): Performed by: STUDENT IN AN ORGANIZED HEALTH CARE EDUCATION/TRAINING PROGRAM

## 2024-01-16 PROCEDURE — 94761 N-INVAS EAR/PLS OXIMETRY MLT: CPT

## 2024-01-16 PROCEDURE — 7100000000 HC PACU RECOVERY - FIRST 15 MIN

## 2024-01-16 PROCEDURE — 80048 BASIC METABOLIC PNL TOTAL CA: CPT

## 2024-01-16 PROCEDURE — 97162 PT EVAL MOD COMPLEX 30 MIN: CPT

## 2024-01-16 PROCEDURE — 82330 ASSAY OF CALCIUM: CPT

## 2024-01-16 PROCEDURE — 2580000003 HC RX 258: Performed by: STUDENT IN AN ORGANIZED HEALTH CARE EDUCATION/TRAINING PROGRAM

## 2024-01-16 PROCEDURE — 2700000000 HC OXYGEN THERAPY PER DAY

## 2024-01-16 PROCEDURE — 94640 AIRWAY INHALATION TREATMENT: CPT

## 2024-01-16 PROCEDURE — 2580000003 HC RX 258: Performed by: PHYSICIAN ASSISTANT

## 2024-01-16 PROCEDURE — 99024 POSTOP FOLLOW-UP VISIT: CPT | Performed by: PHYSICIAN ASSISTANT

## 2024-01-16 PROCEDURE — 92526 ORAL FUNCTION THERAPY: CPT

## 2024-01-16 PROCEDURE — 97112 NEUROMUSCULAR REEDUCATION: CPT

## 2024-01-16 PROCEDURE — 6370000000 HC RX 637 (ALT 250 FOR IP): Performed by: PHYSICIAN ASSISTANT

## 2024-01-16 PROCEDURE — C9113 INJ PANTOPRAZOLE SODIUM, VIA: HCPCS | Performed by: PHYSICIAN ASSISTANT

## 2024-01-16 PROCEDURE — 2500000003 HC RX 250 WO HCPCS: Performed by: REGISTERED NURSE

## 2024-01-16 PROCEDURE — 85025 COMPLETE CBC W/AUTO DIFF WBC: CPT

## 2024-01-16 PROCEDURE — 36592 COLLECT BLOOD FROM PICC: CPT

## 2024-01-16 PROCEDURE — 2000000000 HC ICU R&B

## 2024-01-16 PROCEDURE — 97535 SELF CARE MNGMENT TRAINING: CPT

## 2024-01-16 PROCEDURE — 82962 GLUCOSE BLOOD TEST: CPT

## 2024-01-16 PROCEDURE — 97166 OT EVAL MOD COMPLEX 45 MIN: CPT

## 2024-01-16 PROCEDURE — 83735 ASSAY OF MAGNESIUM: CPT

## 2024-01-16 RX ORDER — DEXAMETHASONE SODIUM PHOSPHATE 4 MG/ML
4 INJECTION, SOLUTION INTRA-ARTICULAR; INTRALESIONAL; INTRAMUSCULAR; INTRAVENOUS; SOFT TISSUE EVERY 6 HOURS
Status: DISCONTINUED | OUTPATIENT
Start: 2024-01-16 | End: 2024-01-18

## 2024-01-16 RX ORDER — IPRATROPIUM BROMIDE AND ALBUTEROL SULFATE 2.5; .5 MG/3ML; MG/3ML
1 SOLUTION RESPIRATORY (INHALATION) EVERY 4 HOURS PRN
Status: DISCONTINUED | OUTPATIENT
Start: 2024-01-16 | End: 2024-01-19 | Stop reason: HOSPADM

## 2024-01-16 RX ORDER — CALCIUM GLUCONATE 20 MG/ML
1000 INJECTION, SOLUTION INTRAVENOUS ONCE
Status: COMPLETED | OUTPATIENT
Start: 2024-01-16 | End: 2024-01-16

## 2024-01-16 RX ORDER — PANTOPRAZOLE SODIUM 40 MG/1
40 TABLET, DELAYED RELEASE ORAL
Status: DISCONTINUED | OUTPATIENT
Start: 2024-01-17 | End: 2024-01-19 | Stop reason: HOSPADM

## 2024-01-16 RX ADMIN — DEXAMETHASONE SODIUM PHOSPHATE 6 MG: 10 INJECTION INTRAMUSCULAR; INTRAVENOUS at 03:49

## 2024-01-16 RX ADMIN — SENNOSIDES, DOCUSATE SODIUM 1 TABLET: 8.6; 5 TABLET ORAL at 21:14

## 2024-01-16 RX ADMIN — SEVELAMER CARBONATE 800 MG: 800 TABLET, FILM COATED ORAL at 11:44

## 2024-01-16 RX ADMIN — DEXAMETHASONE SODIUM PHOSPHATE 4 MG: 4 INJECTION, SOLUTION INTRAMUSCULAR; INTRAVENOUS at 15:02

## 2024-01-16 RX ADMIN — MEGESTROL ACETATE 40 MG: 20 TABLET ORAL at 11:53

## 2024-01-16 RX ADMIN — SODIUM CHLORIDE, PRESERVATIVE FREE 10 ML: 5 INJECTION INTRAVENOUS at 08:04

## 2024-01-16 RX ADMIN — MEGESTROL ACETATE 40 MG: 20 TABLET ORAL at 21:14

## 2024-01-16 RX ADMIN — ISOSORBIDE MONONITRATE 30 MG: 30 TABLET, EXTENDED RELEASE ORAL at 11:43

## 2024-01-16 RX ADMIN — ACETAMINOPHEN 650 MG: 325 TABLET ORAL at 18:51

## 2024-01-16 RX ADMIN — DEXAMETHASONE SODIUM PHOSPHATE 4 MG: 4 INJECTION, SOLUTION INTRAMUSCULAR; INTRAVENOUS at 21:16

## 2024-01-16 RX ADMIN — IPRATROPIUM BROMIDE AND ALBUTEROL SULFATE 1 DOSE: 2.5; .5 SOLUTION RESPIRATORY (INHALATION) at 00:24

## 2024-01-16 RX ADMIN — SODIUM CHLORIDE, PRESERVATIVE FREE 10 ML: 5 INJECTION INTRAVENOUS at 21:36

## 2024-01-16 RX ADMIN — SODIUM CHLORIDE, PRESERVATIVE FREE 10 ML: 5 INJECTION INTRAVENOUS at 21:14

## 2024-01-16 RX ADMIN — SEVELAMER CARBONATE 800 MG: 800 TABLET, FILM COATED ORAL at 17:21

## 2024-01-16 RX ADMIN — LEVETIRACETAM 500 MG: 100 INJECTION, SOLUTION INTRAVENOUS at 08:11

## 2024-01-16 RX ADMIN — PANTOPRAZOLE SODIUM 40 MG: 40 INJECTION, POWDER, FOR SOLUTION INTRAVENOUS at 08:03

## 2024-01-16 RX ADMIN — CARVEDILOL 3.12 MG: 6.25 TABLET, FILM COATED ORAL at 21:36

## 2024-01-16 RX ADMIN — MEROPENEM 500 MG: 500 INJECTION, POWDER, FOR SOLUTION INTRAVENOUS at 17:23

## 2024-01-16 RX ADMIN — CALCIUM GLUCONATE 1000 MG: 20 INJECTION, SOLUTION INTRAVENOUS at 11:33

## 2024-01-16 RX ADMIN — SODIUM CHLORIDE, PRESERVATIVE FREE 10 ML: 5 INJECTION INTRAVENOUS at 08:03

## 2024-01-16 RX ADMIN — SODIUM CHLORIDE 50 ML/HR: 3 INJECTION, SOLUTION INTRAVENOUS at 08:16

## 2024-01-16 RX ADMIN — ACETAMINOPHEN 650 MG: 650 SUPPOSITORY RECTAL at 02:28

## 2024-01-16 RX ADMIN — DEXAMETHASONE SODIUM PHOSPHATE 6 MG: 10 INJECTION INTRAMUSCULAR; INTRAVENOUS at 08:07

## 2024-01-16 RX ADMIN — SENNOSIDES, DOCUSATE SODIUM 1 TABLET: 8.6; 5 TABLET ORAL at 11:45

## 2024-01-16 RX ADMIN — LEVETIRACETAM 500 MG: 100 INJECTION, SOLUTION INTRAVENOUS at 21:14

## 2024-01-16 RX ADMIN — CARVEDILOL 3.12 MG: 6.25 TABLET, FILM COATED ORAL at 11:43

## 2024-01-16 ASSESSMENT — PAIN SCALES - GENERAL
PAINLEVEL_OUTOF10: 0
PAINLEVEL_OUTOF10: 0
PAINLEVEL_OUTOF10: 4
PAINLEVEL_OUTOF10: 5
PAINLEVEL_OUTOF10: 0
PAINLEVEL_OUTOF10: 0

## 2024-01-16 ASSESSMENT — PAIN DESCRIPTION - PAIN TYPE: TYPE: ACUTE PAIN;SURGICAL PAIN

## 2024-01-16 ASSESSMENT — PAIN DESCRIPTION - FREQUENCY: FREQUENCY: CONTINUOUS

## 2024-01-16 ASSESSMENT — PAIN DESCRIPTION - DESCRIPTORS
DESCRIPTORS: ACHING
DESCRIPTORS: OTHER (COMMENT)

## 2024-01-16 ASSESSMENT — PAIN DESCRIPTION - LOCATION
LOCATION: HEAD
LOCATION: HEAD

## 2024-01-16 ASSESSMENT — PAIN DESCRIPTION - ONSET: ONSET: ON-GOING

## 2024-01-16 ASSESSMENT — PAIN DESCRIPTION - ORIENTATION
ORIENTATION: MID;ANTERIOR
ORIENTATION: MID

## 2024-01-16 ASSESSMENT — PAIN - FUNCTIONAL ASSESSMENT: PAIN_FUNCTIONAL_ASSESSMENT: ACTIVITIES ARE NOT PREVENTED

## 2024-01-16 NOTE — PROGRESS NOTES
Notification of IV to PO Conversion     IV To Po Conversion:   Will convert Pantoprazole 40mg IV q24 hours to Pantoprazole 40 mg PO q24 hours based on [facility] IV to PO policy (see below).     Please call with questions.  Gay Sanchez, PharmD, [facility], [facility]  Main Pharmacy: [id number]  1/16/2024 12:37 PM      Criteria for conversion from IV to PO therapy per [facility] IV to PO Protocol:   Patients should meet all of the following inclusion criteria and none of the exclusion criteria    Criteria to initiate medication route switch (Inclusion Criteria)  IV therapy for > 24 hours (antibiotics only)  Tolerating diet more advanced than clear liquids  Tolerating oral (PO) medications  Does not require vasopressor therapy for blood pressure support  No seizures for 72hrs (antiepileptic medications only)      Criteria indicating that the patient is NOT a candidate for IV to PO conversion (Exclusion Criteria)  Infections requiring IV therapy (ie: meningitis, endocarditis, osteomyelitis, pancreatitis)  Nausea and/or vomiting or severe diarrhea within past 24 hours  Has gastrectomy, ileus, gastric outlet or bowel obstruction, or malabsorption syndromes  Has significant, painful oral ulceration  TPN with an NPO order  Active GI bleed  Unable to swallow  Nothing by Mouth (NPO) status  Febrile in the last 24 hours- (antibiotics only)  Clinical deteriorating or unstable - (antibiotics only)  Pediatric patients and patients who are not euthyroid (not on oral levothyroxine/not stabilized on oral levothyroxine) - (Levothyroxine only)  Patients being treated for myxedema coma or during the organ donation process - (Levothyroxine only)    Other notes-   Patients may be given suppositories when available for the product being ordered if no contraindications exist (ie: rectal surgery or infection)   Oral solutions/suspensions may be considered for patients with a functioning enteral tube not on continuous suction (if medication is available in this

## 2024-01-16 NOTE — PROGRESS NOTES
Occupational Therapy  [facility] ACUTE CARE OCCUPATIONAL THERAPY EVALUATION  Christiano Miller, 1945, 2120/2120-A, 1/16/2024    Discharge Recommendation: Inpatient Rehabilitation    History  [place]:  The primary encounter diagnosis was Fall, initial encounter. Diagnoses of Intracranial mass, Confusion, Coccyx pain, and Hematoma were also pertinent to this visit.  Patient  has a past medical history of Abnormal urine, Cancer (HCC), Chronic kidney disease, stage 4, severely decreased GFR (HCC), Diarrhea, Nunez catheter in place, Hearing deficit, Hemodialysis patient (HCC), History of blood transfusion, Hypertension, Inguinal hernia, Missing teeth, acquired, Stomach pain, and Wears glasses.  Patient  has a past surgical history that includes Dialysis fistula creation (Left, 11/29/2016); hernia repair (Right, 11/29/2016); Prostate surgery (04/2017); Axillary Surgery (N/A, 06/22/2021); Abdomen surgery (Right, 06/22/2021); Colonoscopy; and Colonoscopy (N/A, 5/18/2023).    Subjective:  Patient states:  \"She said I was adorable!\"   Pain:  reports slight headache but does not rate.    Communication: RN, CM, co-eval with PT  Restrictions: fall risk and contact precautions, HOB >30 at all times, Keep systolic BP<160     Home Setup/Prior level of function  Social/Functional History  Lives With: Alone  Type of Home: Apartment  Home Layout: One level  Bathroom Shower/Tub: Tub/Shower unit, Shower chair without back  Home Equipment: Rollator (does not use AD at baseline)  ADL Assistance: Independent  Homemaking Assistance: Independent  Homemaking Responsibilities: Yes  Ambulation Assistance: Independent  Transfer Assistance: Independent  Active : Yes    Examination of body systems (includes body structures/functions, activity/participation limitations):  Observation:  Supine in bed upon arrival, agreeable to therapy   Vision:  WFL  Hearing:  [place], hearing aids  Cardiopulmonary:  On room air during session (was  ice chips using spoon with min A to SBA for task completion to occasionally scoop and place spoon in R hand d/t cognition, with cues and increased time pt able to scoot ice occasionally (~50%) but required supervision for pacing. Encouraged family to have pt assist as much as possible with ADL completion while in hospital      Safety: Patient left in chair with alarm on, call light within reach, RN notified, gait belt used.    Assessment:  Pt is a 77 y/o male admitted for The primary encounter diagnosis was Fall, initial encounter. Diagnoses of Intracranial mass, Confusion, Coccyx pain, and Hematoma were also pertinent to this visit.. Pt at baseline is independent with ADLs and independent with functional transfers/mobility using no DME. Pt currently presents w/ deficits relating to ADLs, IADLs, L UE strength, functional activity tolerance, cognition, safety awareness, and functional mobility. Pt underwent right frontal craniotomy for resection of metastatic melanoma on 1/15 (precautions: HOB >30 at all times, Keep systolic BP<160). Pt would benefit from continued acute care OT services w/ discharge to ARU    Complexity: Moderate   Prognosis: Good, no significant barriers to participation at this time.   Occupational Therapy Plan  Times Per Week: 3-5x wk  Times Per Day: Once a day  Current Treatment Recommendations: Strengthening, Balance training, Functional mobility training, Endurance training, Gait training, Neuromuscular re-education, Cognitive reorientation, Safety education & training, Pain management, Equipment evaluation, education, & procurement, Patient/Caregiver education & training, Positioning, Self-Care / ADL, Co-Treatment, Home management training, Cognitive/Perceptual training, Stair training, ROM, Wheelchair mobility training       Goals:  - Pt will perform UE ADLs with stand-by assist using AE PRN by d/c  - Pt will perform LE ADLs with minimal assist using AE PRN by d/c  - Pt will perform

## 2024-01-16 NOTE — RT PROTOCOL NOTE
RT Inhaler-Nebulizer Bronchodilator Protocol Note    There is a bronchodilator order in the chart from a provider indicating to follow the RT Bronchodilator Protocol and there is an “Initiate RT Inhaler-Nebulizer Bronchodilator Protocol” order as well (see protocol at bottom of note).    CXR Findings:  XR CHEST PORTABLE    Result Date: 1/15/2024  1. Placement of right internal jugular catheter which extends 1-2 cm into the right atrium with no evident pneumothorax. 2. Stable appearance the chest with right hilar prominence consistent with a mass in this area and thickening of the right paratracheal stripe related to lymphadenopathy.       The findings from the last RT Protocol Assessment were as follows:   History Pulmonary Disease: Smoker 15 pack years or more  Respiratory Pattern: Regular pattern and RR 12-20 bpm  Breath Sounds: Slightly diminished and/or crackles  Cough: Strong, spontaneous, non-productive  Indication for Bronchodilator Therapy: None  Bronchodilator Assessment Score: 3    Aerosolized bronchodilator medication orders have been revised according to the RT Inhaler-Nebulizer Bronchodilator Protocol below.    Respiratory Therapist to perform RT Therapy Protocol Assessment initially then follow the protocol.  Repeat RT Therapy Protocol Assessment PRN for score 0-3 or on second treatment, BID, and PRN for scores above 3.    No Indications - adjust the frequency to every 6 hours PRN wheezing or bronchospasm, if no treatments needed after 48 hours then discontinue using Per Protocol order mode.     If indication present, adjust the RT bronchodilator orders based on the Bronchodilator Assessment Score as indicated below.  Use Inhaler orders unless patient has one or more of the following: on home nebulizer, not able to hold breath for 10 seconds, is not alert and oriented, cannot activate and use MDI correctly, or respiratory rate 25 breaths per minute or more, then use the equivalent nebulizer order(s)  with same Frequency and PRN reasons based on the score.  If a patient is on this medication at home then do not decrease Frequency below that used at home.    0-3 - enter or revise RT bronchodilator order(s) to equivalent RT Bronchodilator order with Frequency of every 4 hours PRN for wheezing or increased work of breathing using Per Protocol order mode.        4-6 - enter or revise RT Bronchodilator order(s) to two equivalent RT bronchodilator orders with one order with BID Frequency and one order with Frequency of every 4 hours PRN wheezing or increased work of breathing using Per Protocol order mode.        7-10 - enter or revise RT Bronchodilator order(s) to two equivalent RT bronchodilator orders with one order with TID Frequency and one order with Frequency of every 4 hours PRN wheezing or increased work of breathing using Per Protocol order mode.       11-13 - enter or revise RT Bronchodilator order(s) to one equivalent RT bronchodilator order with QID Frequency and an Albuterol order with Frequency of every 4 hours PRN wheezing or increased work of breathing using Per Protocol order mode.      Greater than 13 - enter or revise RT Bronchodilator order(s) to one equivalent RT bronchodilator order with every 4 hours Frequency and an Albuterol order with Frequency of every 2 hours PRN wheezing or increased work of breathing using Per Protocol order mode.     RT to enter RT Home Evaluation for COPD & MDI Assessment order using Per Protocol order mode.    Electronically signed by Cherrie Mares RCP on 1/16/2024 at 11:25 AM

## 2024-01-16 NOTE — PROGRESS NOTES
[facility]  DEPARTMENT OF SPEECH/LANGUAGE PATHOLOGY  DAILY PROGRESS NOTE  Christiano Miller  1/16/2024  [id number]  Intracranial mass [R90.0]  Coccyx pain [M53.3]  Confusion [R41.0]  Metastasis to brain (HCC) [C79.31]  Fall, initial encounter [W19.XXXA]  No Known Allergies      Pt was seen this date for dysphagia treatment.       IMPRESSION AND RECOMMENDATIONS: Christiano Miller was seen for dysphagia follow-up/reassessment per order. Daughter present for majority of visit. Noted pt underwent right frontal craniotomy yesterday 1/15 following initial dysphagia evaluation. He was seated upright in bed, alert, confused, cooperative. He accepted trials of honey thick liquids via tsp/cup/straw, puree, and soft solids. Oral stage remains impaired, characterized by adequate labial seal, slow/disorganized mastication, delayed AP transit and suspected premature pharyngeal entry. Minimal oral residue noted with soft solids. Suspect ongoing pharyngeal dysphagia with reduced laryngeal elevation, occasional repetitive swallows. No overt s/s aspiration with given consistencies.    Recommend continued honey thick liquids, downgrade to minced/moist diet. SLP will continue to follow. Depending upon goals of care, may consider further evaluation with modified barium swallow study. Recommendations/plan d/w pt/daughter and RN.    GOALS (current status in bold):  Short-term Goals  Timeframe for Short-term Goals: LOS  Goal 1: Pt will tolerate soft/bite-sized diet with honey thick liquids with adequate oral manipulation/clearance and no s/s aspiration. Partially meeting, continue honey thick liquids/downgrade to minced/moist diet  Goal 2: Pt will implement recommended strategies 90% given mod cues Ongoing, requires max cues d/t AMS, continue  Goal 3: Pt will participate in instrumental swallow evaluation when appropriate. DNT  Goal 4: Pt/caregivers will indicate understanding of education/recommendations. Meeting,  d/w daughter at bedside and RN              EDUCATION: recommendations/POC    PAIN RATING (0-10 Scale): does not rate  Time in/Time out: SLP Individual Minutes  Time In: 1300  Time Out: 1325  Minutes: 25    Visit number: 2      LARISA Momin  1/16/2024  2:52 PM

## 2024-01-16 NOTE — ANESTHESIA PRE PROCEDURE
to drink 3-4 beers daily                                Counseling given: Not Answered      Vital Signs (Current):   Vitals:    01/15/24 1937 01/15/24 1945 01/15/24 2030 01/15/24 2031   BP:  (!) 153/77  (!) 143/69   Pulse: 81 85 85 85   Resp: 19 17 20 19   Temp:   98 °F (36.7 °C)    TempSrc:   Oral    SpO2: (!) 84% (!) 88%  100%   Weight:       Height:                                                  BP Readings from Last 3 Encounters:   01/15/24 (!) 143/69   12/08/23 128/60   05/26/23 (!) 121/57       NPO Status:                                                                                 BMI:   Wt Readings from Last 3 Encounters:   01/15/24 57 kg (125 lb 10.6 oz)   12/08/23 57.3 kg (126 lb 6.4 oz)   05/26/23 56.2 kg (123 lb 12.8 oz)     Body mass index is 19.11 kg/m².    CBC:   Lab Results   Component Value Date/Time    WBC 11.3 01/15/2024 04:45 AM    RBC 3.58 01/15/2024 04:45 AM    HGB 11.0 01/15/2024 04:45 AM    HCT 33.8 01/15/2024 04:45 AM    MCV 94.4 01/15/2024 04:45 AM    RDW 13.2 01/15/2024 04:45 AM     01/15/2024 04:45 AM       CMP:   Lab Results   Component Value Date/Time     01/15/2024 08:42 PM    K 4.5 01/15/2024 08:42 PM    CL 95 01/15/2024 08:42 PM    CO2 24 01/15/2024 08:42 PM    BUN 48 01/15/2024 08:42 PM    CREATININE 5.5 01/15/2024 08:42 PM    GFRAA 22 09/30/2022 01:00 PM    LABGLOM 10 01/15/2024 08:42 PM    GLUCOSE 115 01/15/2024 08:42 PM    PROT 7.1 01/14/2024 07:25 AM    PROT 7.7 04/15/2011 09:45 PM    CALCIUM 9.4 01/15/2024 08:42 PM    BILITOT 0.4 01/14/2024 07:25 AM    ALKPHOS 117 01/14/2024 07:25 AM    AST 14 01/14/2024 07:25 AM    ALT 14 01/14/2024 07:25 AM       POC Tests:   Recent Labs     01/15/24  2055   POCGLU 105*       Coags:   Lab Results   Component Value Date/Time    PROTIME 11.4 07/19/2022 10:17 AM    INR 0.88 07/19/2022 10:17 AM    APTT 30.9 07/19/2022 10:17 AM       HCG (If Applicable): No results found for: \"PREGTESTUR\", \"PREGSERUM\", \"HCG\", \"HCGQUANT\"     ABGs: 
Performed

## 2024-01-16 NOTE — PROGRESS NOTES
Inpatient Progress Note 1/16/2024        Christiano Miller  1945  [id number]      Assessment/Plan:    Encephalopathy  Multiple brain metastases (some hemorrhagic) secondary to metastatic malignant melanoma, S/P brain resection frontal obe lesion 1/15/2024   Post op pneumocephalus  History of malignant melanoma (prior wide excision back lesion 6/21, chemotherapy, pulmonary metastases established 11/22 via bronchoscopy (EBUS)  ESRD  Question of colon mass  History of prostatic hypertrophy bladder outlet obstruction  History of ESBL E. coli urinary tract infection, 9/23  Protein calorie malnutrition, cachexia      Systemic steroid, hypertonic saline  Radiation therapy to brain  Conservative measures for post op pneumocephalus, supplemental O2 for current time  Dialytic support  Empiric Keppra per neurosurgical service  Ulcer, DVT prophylaxis    Serial chemistries given administration of hypertonic saline    Very poor prognosis, discussed and reviewed with oncology service    Complex decisions required for evaluation management reviewed during critical care rounds    SHEREE Trujillo  [phone]    Subjective:    No acute overnight issues reported per discussion with nursing staff.  The patient ios more responsive, interactive although remains confused.      Review of Systems     Unable to obtain in light of current clinical circumstances    Physical Exam:           BP (!) 148/64   Pulse 71   Temp 98.5 °F (36.9 °C) (Oral)   Resp 16   Ht 1.727 m (5' 8\")   Wt 54.3 kg (119 lb 11.4 oz)   SpO2 100%   BMI 18.20 kg/m²       General: Chronically ill-appearing male, arouses, no distress vitals reviewed  Eyes: Pupils and motor are intact  ENT: Head normal.  Hearing impaired.  Moist oral mucosa.  Neck supple  Cardiovascular: Regular rate.  Respiratory: Clear to auscultation  Gastrointestinal: Soft, non tender  Genitourinary: no suprapubic tenderness  Musculoskeletal: Left upper extremity AV fistula.  Skin: warm, dry,  called?->No Reason for exam:->fall, confused Decision Support Exception - unselect if not a suspected or confirmed emergency medical condition->Emergency Medical Condition (MA) Reason for Exam: fall, confused; Head trauma, closed, mild, GCS >= 13, no risk factors, neuro exam normal FINDINGS: BRAIN/VENTRICLES: There is a mixed density hyperdense mass involving the left temporal lobe measuring 2.1 x 1.5 cm.  There is surrounding low attenuation. There is a larger hyperdense mass in the high right frontal lobe measuring 3.3 x 3.1 cm with surrounding low-attenuation.  There is a 3rd hyperdense lesion seen adjacent to the larger right frontal lesion measuring 1 cm.  In the left parietal lobe, there is frondlike low-attenuation.  No mass-effect or midline shift is seen.  No acute areas of infarction are noted.  The cerebral sulci and ventricles are enlarged compatible with diffuse cerebral atrophy. There is low-attenuation in the periventricular white matter and deep white matter of the brain representing changes of chronic small vessel ischemic disease. ORBITS: The visualized portion of the orbits demonstrate no acute abnormality. SINUSES: The visualized paranasal sinuses and mastoid air cells demonstrate no acute abnormality.  There is mucosal disease involving the maxillary, ethmoid, sphenoid and frontal sinuses. SOFT TISSUES/SKULL: No acute abnormality of the visualized skull or soft tissues.     1. Multiple hyperdense intra-axial masses with surrounding edema but no mass effect or midline shift.  These findings are compatible with hemorrhagic metastases from his known melanoma.  I would recommend MRI brain with contrast for further evaluation.       Recent Results (from the past 24 hour(s))   POCT Glucose    Collection Time: 01/15/24 12:12 PM   Result Value Ref Range    POC Glucose 111 (H) 70 - 99 MG/DL   POCT Glucose    Collection Time: 01/15/24  4:25 PM   Result Value Ref Range    POC Glucose 102 (H) 70 - 99 MG/DL

## 2024-01-16 NOTE — PROGRESS NOTES
Neurosurgery Progress Note  1/16/2024 10:36 AM      Right frontal craniotomy for resection of metastatic melanoma  POD: 0    Assessment and Plan:   S/P craniotomy, resection of tumor- tumor sent for pathology, continue keppra, decadron decreased to 4mg every 6 hours. 3% hypertonic discontinued, allow Na to trend to normal. Patient with improved speech today. He can leave ICU and begin dispo planning. Keep systolic BP<160.   Metastatic melanoma- oncology and rad onc. Are following. We recommend gamma knife radiation vs whole brain as well as restarting the patient on systemic treatment of his disease if he is agreeable. Would want radiation performed as soon as possible as regrowth of tumor is high.   ESRD- on dialysis, has been on dialysis for 7-8 years per family  ESBL positive e. Coli- present in urine. On meropenum.    Supervising physician: Saray Neely MD.  Dr. Neely was readily and continuously available by phone for direct consultation regarding the care of this patient.    Dragon dictation may have been used in the writing of this note. Spelling or word errors may have occurred. Please call for clarification if there are concerns.      Subjective:   Admit Date: 1/14/2024  PCP: Musa Corral MD    Patient sitting up in bed. His speech has improved from prior to surgery. He is easier to understand and is speaking in sentences and having a conversation. He does state that he sees bugs on the wall. He denies any headaches, N/V or blurry vision. He denies any sensory changes in his arms or legs.     Data:   Scheduled Meds:   calcium gluconate-NaCl  1,000 mg IntraVENous Once    dexAMETHasone  4 mg IntraVENous Q6H    meropenem  500 mg IntraVENous Q24H    sennosides-docusate sodium  1 tablet Oral BID    ipratropium 0.5 mg-albuterol 2.5 mg  1 Dose Inhalation Q4H WA RT    levETIRAcetam  500 mg IntraVENous Q12H    sodium chloride flush  5-40 mL IntraVENous 2 times per day    sodium chloride flush  5-40 mL

## 2024-01-16 NOTE — CARE COORDINATION
PT/OT ordered 1/14. Pt was driving self to HD last week. Family has expressed plan to care for pt at ex-wife's home. Cm notes that per flowsheet pt is max assist and will need to discuss with family if they can manage that level of pt debility. Hospice may also be an option. Whiteboard message left for PT/OT re; need for evals should SNF placement be needed.

## 2024-01-16 NOTE — PROGRESS NOTES
V2.0    [facility] [place]      Name:  Christiano Miller /Age/Sex: 1945  (78 y.o. male)   MRN & CSN:  [id number] & [id number] Encounter Date/Time: 2024 1:07 PM EST   Location:  -A PCP: Musa Corral MD     Attending:Chris Pavon MD       Hospital Day: 3    Assessment and Recommendations   Christiano Miller is a 78 y.o. male with pmh of CKD, HTN, and melanoma who presents with Metastasis to brain (HCC) s/p right frontal lobe tumor resection 1/15      Plan:   Malignant melanoma: MRI brain  showed 5 intracranial metastatic lesions the largest was hemorrhagic in nature located within the right frontal lobe measuring 3.6 x 3.5 cm s/p resection of tumor per neurosurgery 1/15/2024.  Dr. Schmidt  following and discussing options including radiation versus hospice.  Neurosurgery recommending gamma knife radiation versus whole brain as well as possible systemic treatment if patient agreeable.  Continue Decadron, Keppra  Acute respiratory failure with hypoxia: On 15 L nonrebreather postoperatively and will wean/titrate as able.  ESBL UTI: Urine culture  with E. coli ESBL.  Continue meropenem with plan for 1 week course and has history of ESBL.  End-stage renal disease on dialysis: Continue dialysis per nephrology.      Diet ADULT DIET; Regular   DVT Prophylaxis [] Lovenox, []  Heparin, [x] SCDs, [] Ambulation,  [] Eliquis, [] Xarelto  [] Coumadin   Code Status Full Code   Disposition From: home  Expected Disposition: SNF  Estimated Date of Discharge: 1-2 days, therapy consulted  Patient requires continued admission due to post op brain surgery, UTI   Surrogate Decision Maker/ ALLEN Jimenez child     Personally reviewed Lab Studies and Imaging     Discussed management of the case with CM and discussing GOC with patient/oncology.       Imaging that was interpreted personally includes CT and results post surgical changes on , with some residual blood products    Drugs that require

## 2024-01-16 NOTE — ANESTHESIA POSTPROCEDURE EVALUATION
Department of Anesthesiology  Postprocedure Note    Patient: Christiano Miller  MRN: [id number]  YOB: 1945  Date of evaluation: 1/15/2024    Procedure Summary       Date: 01/15/24 Room / Location: [address]    Anesthesia Start: 2049 Anesthesia Stop:     Procedure: RIGHT FRONTAL CRANIOTOMY FOR EVACUATION OF INTRACEREBRAL HEMATOMA AND TUMOR RESECTION (Head) Diagnosis:       Hematoma      (Hematoma [T14.8XXA])    Surgeons: Saray Neely MD Responsible Provider: Wagner Singer MD    Anesthesia Type: general ASA Status: 4 - Emergent            Anesthesia Type: No value filed.    Roosevelt Phase I:      Roosevelt Phase II:      Anesthesia Post Evaluation    Patient location during evaluation: ICU  Patient participation: complete - patient participated  Level of consciousness: lethargic  Pain score: 0  Airway patency: patent  Nausea & Vomiting: no nausea and no vomiting  Cardiovascular status: hemodynamically stable  Respiratory status: acceptable  Hydration status: euvolemic  Pain management: adequate    No notable events documented.

## 2024-01-16 NOTE — PROGRESS NOTES
Pt had run of what appeared to be SVT, rate up to 130's, lasted approx 1 minute. Self converted before EKG could be done. Notified Dr Hummel.

## 2024-01-16 NOTE — PROGRESS NOTES
Neurosurgery Post-op Note    PROCEDURE: Resection of right frontal lobe tumor    1/15/2024 10:53 PM    Patient seen in ICU  Answers to name, eyes remain closed. Pupils equal and briskly reactive at 3mm bilaterally.  Able to bend right leg and lift right arm to face. Can squeeze left hand weakly, withdrawals in left leg to pain  Incision intact with monocryl, dermabond in place    BP (!) 143/69   Pulse 85   Temp 98 °F (36.7 °C) (Oral)   Resp 19   Ht 1.727 m (5' 8\")   Wt 57 kg (125 lb 10.6 oz) Comment: with rainbow sheet  SpO2 100%   BMI 19.11 kg/m²       Plan:  Return to ICU  Neuro checks every 2 hours  Activity as tolerated  Advance diet as tolerated, on thickened liquids  Tumor sent for pathology  Post-operative CT ordered  Keep systolic BP<160  Continue with 3%, monitor sodiums  Keep HOB>30 degrees  Please call our service if there are any changes in neuro exam Obtain stat ct if there are any changes.    Electronically signed by Venus Mcdowell PA-C on 1/15/2024 at 10:53 PM

## 2024-01-16 NOTE — PROGRESS NOTES
01/16/24 1039   Encounter Summary   Encounter Overview/Reason  Initial Encounter   Service Provided For: Patient   Referral/Consult From: [facility]   Support System Children   Last Encounter  01/16/24   Begin Time 1010   End Time  1020   Total Time Calculated 10 min   Spiritual/Emotional needs   Type Spiritual Support   Assessment/Intervention/Outcome   Assessment Coping   Intervention Active listening;Sustaining Presence/Ministry of presence   Outcome Encouraged   Plan and Referrals   Plan/Referrals Continue Support (comment)

## 2024-01-16 NOTE — PROGRESS NOTES
Physical Therapy is at the bedside seeing Pt. Pt's 2 daughters are also at the bedside with him. Updated both of his daughters on his current condition and they both voiced understanding of the update and all of their questions were answered.

## 2024-01-16 NOTE — PROGRESS NOTES
MRI shows multiple intracranial masses consistent with metastasis.  The most mass effect is related to a right frontal mass with surrounding hemorrhage.  There is a fluid/fluid level consistent with different ages of blood.  There is also surrounding vasogenic edema.  The patient was doing quite well until this past Saturday when he had acute change in mental status.  This is likely due to acute hemorrhage.  He has continued to be more altered through the day today.  I spoke with the patient and his family, particularly his daughter and son who are next of kin.  Informed consent was obtained for right frontal craniotomy and evacuation of hematoma as well as resection of tumor.  All questions were answered to their satisfaction.  The surgical site was marked.

## 2024-01-16 NOTE — PLAN OF CARE
Problem: Discharge Planning  Goal: Discharge to home or other facility with appropriate resources  Outcome: Progressing  Flowsheets  Taken 1/16/2024 0800 by Brianda Castaneda RN  Discharge to home or other facility with appropriate resources:   Identify barriers to discharge with patient and caregiver   Refer to discharge planning if patient needs post-hospital services based on physician order or complex needs related to functional status, cognitive ability or social support system  Taken 1/15/2024 2000 by Dinora Mccain RN  Discharge to home or other facility with appropriate resources:   Identify barriers to discharge with patient and caregiver   Arrange for needed discharge resources and transportation as appropriate   Identify discharge learning needs (meds, wound care, etc)   Refer to discharge planning if patient needs post-hospital services based on physician order or complex needs related to functional status, cognitive ability or social support system     Problem: Pain  Goal: Verbalizes/displays adequate comfort level or baseline comfort level  Outcome: Progressing     Problem: Safety - Adult  Goal: Free from fall injury  Outcome: Progressing     Problem: Nutrition Deficit:  Goal: Optimize nutritional status  Outcome: Progressing     Problem: Skin/Tissue Integrity  Goal: Absence of new skin breakdown  Description: 1.  Monitor for areas of redness and/or skin breakdown  2.  Assess vascular access sites hourly  3.  Every 4-6 hours minimum:  Change oxygen saturation probe site  4.  Every 4-6 hours:  If on nasal continuous positive airway pressure, respiratory therapy assess nares and determine need for appliance change or resting period.  Outcome: Progressing     Problem: Chronic Conditions and Co-morbidities  Goal: Patient's chronic conditions and co-morbidity symptoms are monitored and maintained or improved  Outcome: Progressing     Problem: ABCDS Injury Assessment  Goal: Absence of physical

## 2024-01-16 NOTE — DISCHARGE INSTRUCTIONS
Craniotomy Discharge Instructions       You are being given this information on craniotomy as part of your discharge instructions. You have just undergone brain surgery. There are now several steps you can do which will help speed your recovery as well as, give you every chance for the best recovery.     ACTIVITIES   Until you are seen in the office for your first post-operative visit we advise you to take it easy. This does not mean bed-rest, but resuming your normal activities or athletic activities during this period is not recommended.   Avoid bending and straining. No lifting or reaching overhead.  Lift nothing heavier than fifteen (15) pounds.   Heavy housework and yard work is not recommended.   DO NOT jog or run. Daily walking outdoors or at a shopping mall is recommended.  DO NOT drive until your physician has given you permission and you have had your first post-op visit. You may ride in a car up to fifty miles.   Avoid smoking as it can slow the healing process and recovery.     SHOWERING   You may shower 2 days after your surgery.  Allow water to run over incision but do not soak or submerge incisions. Wash your hair with gentle shampoo and pat dry.  Do not use conditioner near your incision.  Do not submerge in a bathtub, swimming pool, or hot tub for four (4) weeks.     WOUND CARE   Keep incision clean and dry and open to air as much as possible. No ointments or creams.  Do not use gel, mousse, hairspray or any other hair products until your incision is fully healed.   Do not blow dry your hair or use a curling iron or  near your incision as it could melt the sutures.  The sutures will dissolve and fall out on their own over the next ~4 weeks.  You may apply a bandage to the incision if there is drainage or if your eye glasses are rubbing against the incision or causing irritation. Otherwise, leave it open to air.  Check your incision for redness, swelling, or drainage daily.     WHEN TO

## 2024-01-16 NOTE — CARE COORDINATION
Pt is POD # 1 craniotomy. CM spoke with pt's dgt and pt decided last evening with his family to move forward with craniotomy and possible radiation. Pt is up in the chair today. Per dgt, speech is better etc. Plan had been for pt to go to ex wife home with family providing care. Per dgt PT/OT did evaluate pt today but family unaware of recs at this time. Cm asked if family would consider short term SNF if recommended for short term rehab and they are uncertain. MARÍA also discussed skilled PT/OT Homecare as an options. CM will review PT/OT evals and continue to work with pt/family on discharge planning.    PT/OT eval reviewed and recommendation for ARU noted. CM spoke briefly with dgt about possible ARU and they would definitely be interested in ARU is it is an option for pt. Cm attempted to contact ARU admissions with voice mail message left re; referral.

## 2024-01-16 NOTE — PROGRESS NOTES
ONCOLOGY HEMATOLOGY  PROGRESS NOTE    2024  8:10 AM    Patient:    Christiano Miller  : 1945   78 y.o.             MRN: [id number]  Admitted: 2024  6:36 AM ATT: Chris Pavon MD   -A  AdmitDx: Intracranial mass [R90.0]  Coccyx pain [M53.3]  Confusion [R41.0]  Metastasis to brain (HCC) [C79.31]  Fall, initial encounter [W19.XXXA]  PCP: Musa Corral MD    HISTORY OF PRESENT ILLNESS   Christiano Miller is a 78 y.o. male who was admitted after attaining a fall, unsteady gait and also dysarthria.  Denies any syncopal episode.  Denies any vision changes.  Reported dizziness.  No other focal weakness.  CT scan of the head on admission with multiple hypodense lesions mostly hemorrhagic brain metastasis.  This was followed by MRI of the brain which revealed 5 intracranial metastasis, largest of which is hemorrhagic in the right frontal lobe.  With surrounding edema.  Got a loading dose of steroids followed by maintenance steroids.  Has been evaluated by neurosurgery.      BACKGROUND ONCOLOGY HISTORY:  Patient known to Dr. Lugo, with diagnosis of melanoma, stage IIa in May 2021.  He had wide excision with sentinel lymph node biopsy.  He was under surveillance until 2022 when he was diagnosed with metastatic melanoma.  BRAF could not be performed secondary to insufficient tissue.  He started immunotherapy  and looks like he received until 2023.  He did have response as per PET scan in May 2023.  He then stopped Keytruda secondary to adverse effects.  Refused radiation therapy or BRAF inhibitor.  Repeat CT scan of the chest with interval increase in the size of the medial right upper lobe lung nodule.  In addition there were new large right hilar enlarged lymph nodes.    : CBC with wbc 14, hb 11, plt 261  I discuss with him about RT to  the brain.   Seen by nephrologist for kidney disease.    2024 awake, alert, confused.    1/15/2024 CT chest:    1. Disease progression.  A right upper lobe nodule is larger than before  measuring up to 2.9 cm versus 2.5 cm previously.  Right axillary  lymphadenopathy is also increased where lymph node measures up to 3.2 cm in shortest dimension versus 2.6 cm previously.  2. Emphysema.  3. Borderline cardiomegaly.    1/15/2024 CT AP  1. No evidence of metastatic disease in the abdomen or pelvis.  2. Colonic diverticulosis and a moderate amount of stool in the colon  suggesting constipation.  There is again an abrupt caliber change at the mid  sigmoid portion of the colon, but no definitive abnormal thickening is noted  in this area.  3. Severe prostatomegaly and persistent bladder wall thickening which likely  represents associated hypertrophy.  The bladder is also moderately distended raising the question of some degree of outlet obstruction.  He has colonoscopy by Dr Groves in 2023.  He underwent right frontal lobe tumor resection and hematoma evacuation.  I will try to meet family in AM.    PHYSICAL EXAM    Vitals: BP (!) 145/58   Pulse 73   Temp 98.5 °F (36.9 °C) (Oral)   Resp 16   Ht 1.727 m (5' 8\")   Wt 54.3 kg (119 lb 11.4 oz)   SpO2 100%   BMI 18.20 kg/m²     CONSTITUTIONAL: alert, awake. Confused.   EYES: ORTIZ, + pallor, no icterus  ENT: ATNC  NECK: No JVD  HEMATOLOGIC/LYMPHATIC: no cervical, supraclavicular or axillary lymphadenopathy   LUNGS: CTA bilaterally.  CARDIOVASCULAR: s1s2 rrr no murmurs  ABDOMEN: soft ntnd bs pos  NEUROLOGIC: moving all 4 ext  SKIN: No rash  EXTREMITIES: no LE edema bilaterally    LABORATORY RESULTS  CBC:   Recent Labs     01/14/24  0725 01/15/24  0445 01/16/24  0510   WBC 14.1* 11.3* 12.7*   HGB 11.0* 11.0* 10.0*    262 257     BMP:    Recent Labs     01/15/24  2148 01/16/24  0130 01/16/24  0510    141 144   K 4.2 4.4 4.6   CL 97* 101 105   CO2 22 21 21   BUN 47* 53* 58*   CREATININE 5.6* 5.6* 6.2*   GLUCOSE 120* 129* 123*     Hepatic:   Recent Labs     01/14/24  0725

## 2024-01-16 NOTE — OP NOTE
Operative Note      Patient: Christiano Miller  YOB: 1945  MRN: [id number]    Date of Procedure: 1/15/2024    Preoperative Diagnosis:  Brain metastasis  Metastatic melanoma  Broca's aphasia    Post-Op Diagnosis: Same       Procedure:  Right frontal craniotomy for resection of metastatic melanoma  Microdissection    Surgeon(s):  Saray Neely MD    Assistant:   Physician Assistant: Venus Mcdowell PA-C    Anesthesia: General    Estimated Blood Loss (mL): less than 50     Complications: None    Specimens:   ID Type Source Tests Collected by Time Destination   A : RIGHT FRONTAL LOBE TUMOR Tissue Tissue SURGICAL PATHOLOGY Saray Neely MD 1/15/2024 2157        Implants:  Implant Name Type Inv. Item Serial No.  Lot No. LRB No. Used Action   GRAFT SYNTHECEL DURA REPR 2.5X2.5CM 1X1 IN S - [id number]  GRAFT SYNTHECEL DURA REPR 2.5X2.5CM 1X1 IN S  DEPUY SYNTHES [place] [id number] N/A 1 Implanted   COVER BUR H [id number] 6 H CRANIOMAXILLOFACIAL ANEL TI - [id number]  COVER BUR H [id number] 6 H CRANIOMAXILLOFACIAL ANEL TI  DEPUY SYNTHES [place]WD  N/A 2 Implanted   [employer] NEURO SYNTHES CRANIAL FIXATION SYSTEM   [id number]   N/A 1 Implanted         Drains:   Urinary Catheter 01/15/24 (Active)   Catheter Indications Need for fluid volume management of the critically ill patient in a critical care setting 01/16/24 0800   Site Assessment Pink;No urethral drainage 01/16/24 0800   Urine Color Yellow 01/16/24 0800   Urine Appearance Sediment 01/16/24 0800   Urine Odor Other (Comment) 01/16/24 0800   Collection Container Standard 01/16/24 0800   Securement Method Securing device (Describe) 01/16/24 0800   Catheter Care  Soap and water 01/16/24 0400   Catheter Best Practices  Catheter secured to thigh;Tamper seal intact;Bag below bladder;Bag not on floor;Lack of dependent loop in tubing;Drainage bag less than half full 01/16/24 0800   Status Draining;Patent 01/16/24 0800   Output (mL) 120 mL 01/16/24 0614        Findings: Hemorrhagic tumor        Detailed Description of Procedure:   The patient was positioned supine on the OR table with the head positioned on a doughnut.  All pressure points were padded.  Timeout was performed.  Prophylactic antibiotic was administered.  The scalp was prepped and draped in sterile fashion.  Linear incision was marked in the right frontal region.  The skin was infiltrated with 1% lidocaine with epinephrine.  Skin incision was made and cautery used to expose the underlying right frontal bone.  A single bur hole was made with cutting bur.  Craniotome was then used to turn a small bone flap.  The dura was opened in curvilinear fashion.  Cautery was applied to the peel surface.  Corticotomy was then made with bipolar and suction.  A tract was created towards right frontal hemorrhage.  The hematoma was evacuated with suction.  Deep to the hematoma, metastatic tumor was encountered.  This was dissected away from the surrounding brain with a #4 Penfield.  The tumor was removed en bloc.  Hemostasis was achieved with cautery and Floseal.  Copious irrigation was performed.  The dura was closed with Nurolon.  The bone flap was fixated with titanium plates and screws.  Copious irrigation of the scalp was performed.  The galea was closed with 2-0 Vicryl.  4-0 Monocryl was placed in subcuticular fashion.  LiquiBand was applied to the skin.  Counts were verified to be correct prior to closure.  The patient was extubated and transported to PACU in stable condition.    Electronically signed by Saray Neely MD on 1/16/2024 at 9:49 AM

## 2024-01-17 ENCOUNTER — APPOINTMENT (OUTPATIENT)
Dept: CT IMAGING | Age: 79
End: 2024-01-17
Payer: MEDICARE

## 2024-01-17 LAB
ANION GAP SERPL CALCULATED.3IONS-SCNC: 16 MMOL/L (ref 7–16)
BASOPHILS ABSOLUTE: 0 K/CU MM
BASOPHILS RELATIVE PERCENT: 0 % (ref 0–1)
BUN SERPL-MCNC: 80 MG/DL (ref 6–23)
CALCIUM IONIZED: 4.72 MG/DL (ref 4.48–5.28)
CALCIUM SERPL-MCNC: 8.9 MG/DL (ref 8.3–10.6)
CHLORIDE BLD-SCNC: 106 MMOL/L (ref 99–110)
CO2: 20 MMOL/L (ref 21–32)
CREAT SERPL-MCNC: 7.6 MG/DL (ref 0.9–1.3)
CULTURE: ABNORMAL
CULTURE: ABNORMAL
DIFFERENTIAL TYPE: ABNORMAL
EOSINOPHILS ABSOLUTE: 0 K/CU MM
EOSINOPHILS RELATIVE PERCENT: 0 % (ref 0–3)
GFR SERPL CREATININE-BSD FRML MDRD: 7 ML/MIN/1.73M2
GLUCOSE BLD-MCNC: 100 MG/DL (ref 70–99)
GLUCOSE BLD-MCNC: 107 MG/DL (ref 70–99)
GLUCOSE BLD-MCNC: 113 MG/DL (ref 70–99)
GLUCOSE BLD-MCNC: 129 MG/DL (ref 70–99)
GLUCOSE SERPL-MCNC: 123 MG/DL (ref 70–99)
HCT VFR BLD CALC: 30 % (ref 42–52)
HEMOGLOBIN: 9.5 GM/DL (ref 13.5–18)
IMMATURE NEUTROPHIL %: 0.7 % (ref 0–0.43)
IONIZED CA: 1.18 MMOL/L (ref 1.12–1.32)
LYMPHOCYTES ABSOLUTE: 0.6 K/CU MM
LYMPHOCYTES RELATIVE PERCENT: 4.3 % (ref 24–44)
Lab: ABNORMAL
MAGNESIUM: 2.1 MG/DL (ref 1.8–2.4)
MCH RBC QN AUTO: 30.4 PG (ref 27–31)
MCHC RBC AUTO-ENTMCNC: 31.7 % (ref 32–36)
MCV RBC AUTO: 95.8 FL (ref 78–100)
MONOCYTES ABSOLUTE: 0.6 K/CU MM
MONOCYTES RELATIVE PERCENT: 4.2 % (ref 0–4)
NUCLEATED RBC %: 0 %
PDW BLD-RTO: 13.5 % (ref 11.7–14.9)
PHOSPHORUS: 6.4 MG/DL (ref 2.5–4.9)
PLATELET # BLD: 217 K/CU MM (ref 140–440)
PMV BLD AUTO: 9.6 FL (ref 7.5–11.1)
POTASSIUM SERPL-SCNC: 5.1 MMOL/L (ref 3.5–5.1)
RBC # BLD: 3.13 M/CU MM (ref 4.6–6.2)
SEGMENTED NEUTROPHILS ABSOLUTE COUNT: 12.5 K/CU MM
SEGMENTED NEUTROPHILS RELATIVE PERCENT: 90.8 % (ref 36–66)
SODIUM BLD-SCNC: 142 MMOL/L (ref 135–145)
SPECIMEN: ABNORMAL
TOTAL IMMATURE NEUTOROPHIL: 0.09 K/CU MM
TOTAL NUCLEATED RBC: 0 K/CU MM
WBC # BLD: 13.7 K/CU MM (ref 4–10.5)

## 2024-01-17 PROCEDURE — 2580000003 HC RX 258: Performed by: PHYSICIAN ASSISTANT

## 2024-01-17 PROCEDURE — 6360000002 HC RX W HCPCS: Performed by: PHYSICIAN ASSISTANT

## 2024-01-17 PROCEDURE — 6370000000 HC RX 637 (ALT 250 FOR IP): Performed by: NEUROLOGICAL SURGERY

## 2024-01-17 PROCEDURE — 2000000000 HC ICU R&B

## 2024-01-17 PROCEDURE — 99222 1ST HOSP IP/OBS MODERATE 55: CPT | Performed by: PHYSICIAN ASSISTANT

## 2024-01-17 PROCEDURE — 85025 COMPLETE CBC W/AUTO DIFF WBC: CPT

## 2024-01-17 PROCEDURE — 84100 ASSAY OF PHOSPHORUS: CPT

## 2024-01-17 PROCEDURE — 6370000000 HC RX 637 (ALT 250 FOR IP): Performed by: PHYSICIAN ASSISTANT

## 2024-01-17 PROCEDURE — 36592 COLLECT BLOOD FROM PICC: CPT

## 2024-01-17 PROCEDURE — 70450 CT HEAD/BRAIN W/O DYE: CPT

## 2024-01-17 PROCEDURE — 94761 N-INVAS EAR/PLS OXIMETRY MLT: CPT

## 2024-01-17 PROCEDURE — 99024 POSTOP FOLLOW-UP VISIT: CPT | Performed by: PHYSICIAN ASSISTANT

## 2024-01-17 PROCEDURE — 2700000000 HC OXYGEN THERAPY PER DAY

## 2024-01-17 PROCEDURE — 82962 GLUCOSE BLOOD TEST: CPT

## 2024-01-17 PROCEDURE — 82330 ASSAY OF CALCIUM: CPT

## 2024-01-17 PROCEDURE — 83735 ASSAY OF MAGNESIUM: CPT

## 2024-01-17 PROCEDURE — 90935 HEMODIALYSIS ONE EVALUATION: CPT

## 2024-01-17 PROCEDURE — 80048 BASIC METABOLIC PNL TOTAL CA: CPT

## 2024-01-17 RX ORDER — ENOXAPARIN SODIUM 100 MG/ML
40 INJECTION SUBCUTANEOUS DAILY
Status: DISCONTINUED | OUTPATIENT
Start: 2024-01-17 | End: 2024-01-17

## 2024-01-17 RX ORDER — HEPARIN SODIUM 5000 [USP'U]/ML
5000 INJECTION, SOLUTION INTRAVENOUS; SUBCUTANEOUS EVERY 8 HOURS SCHEDULED
Status: DISCONTINUED | OUTPATIENT
Start: 2024-01-17 | End: 2024-01-19 | Stop reason: HOSPADM

## 2024-01-17 RX ADMIN — SENNOSIDES, DOCUSATE SODIUM 1 TABLET: 8.6; 5 TABLET ORAL at 08:22

## 2024-01-17 RX ADMIN — DEXAMETHASONE SODIUM PHOSPHATE 4 MG: 4 INJECTION, SOLUTION INTRAMUSCULAR; INTRAVENOUS at 02:44

## 2024-01-17 RX ADMIN — ISOSORBIDE MONONITRATE 30 MG: 30 TABLET, EXTENDED RELEASE ORAL at 08:22

## 2024-01-17 RX ADMIN — SEVELAMER CARBONATE 800 MG: 800 TABLET, FILM COATED ORAL at 17:28

## 2024-01-17 RX ADMIN — DEXAMETHASONE SODIUM PHOSPHATE 4 MG: 4 INJECTION, SOLUTION INTRAMUSCULAR; INTRAVENOUS at 08:22

## 2024-01-17 RX ADMIN — SODIUM CHLORIDE, PRESERVATIVE FREE 10 ML: 5 INJECTION INTRAVENOUS at 08:13

## 2024-01-17 RX ADMIN — LEVETIRACETAM 500 MG: 100 INJECTION, SOLUTION INTRAVENOUS at 20:15

## 2024-01-17 RX ADMIN — MEGESTROL ACETATE 40 MG: 20 TABLET ORAL at 20:17

## 2024-01-17 RX ADMIN — CARVEDILOL 3.12 MG: 6.25 TABLET, FILM COATED ORAL at 08:22

## 2024-01-17 RX ADMIN — MEROPENEM 500 MG: 500 INJECTION, POWDER, FOR SOLUTION INTRAVENOUS at 17:35

## 2024-01-17 RX ADMIN — SODIUM CHLORIDE, PRESERVATIVE FREE 10 ML: 5 INJECTION INTRAVENOUS at 20:11

## 2024-01-17 RX ADMIN — HEPARIN SODIUM 5000 UNITS: 5000 INJECTION INTRAVENOUS; SUBCUTANEOUS at 14:14

## 2024-01-17 RX ADMIN — HEPARIN SODIUM 5000 UNITS: 5000 INJECTION INTRAVENOUS; SUBCUTANEOUS at 20:16

## 2024-01-17 RX ADMIN — CARVEDILOL 3.12 MG: 6.25 TABLET, FILM COATED ORAL at 20:15

## 2024-01-17 RX ADMIN — MEGESTROL ACETATE 40 MG: 20 TABLET ORAL at 08:23

## 2024-01-17 RX ADMIN — DEXAMETHASONE SODIUM PHOSPHATE 4 MG: 4 INJECTION, SOLUTION INTRAMUSCULAR; INTRAVENOUS at 20:15

## 2024-01-17 RX ADMIN — DEXAMETHASONE SODIUM PHOSPHATE 4 MG: 4 INJECTION, SOLUTION INTRAMUSCULAR; INTRAVENOUS at 14:14

## 2024-01-17 RX ADMIN — SEVELAMER CARBONATE 800 MG: 800 TABLET, FILM COATED ORAL at 08:22

## 2024-01-17 RX ADMIN — SEVELAMER CARBONATE 800 MG: 800 TABLET, FILM COATED ORAL at 12:17

## 2024-01-17 RX ADMIN — SENNOSIDES, DOCUSATE SODIUM 1 TABLET: 8.6; 5 TABLET ORAL at 20:15

## 2024-01-17 RX ADMIN — PANTOPRAZOLE SODIUM 40 MG: 40 TABLET, DELAYED RELEASE ORAL at 08:22

## 2024-01-17 RX ADMIN — LEVETIRACETAM 500 MG: 100 INJECTION, SOLUTION INTRAVENOUS at 08:22

## 2024-01-17 ASSESSMENT — PAIN SCALES - GENERAL
PAINLEVEL_OUTOF10: 0

## 2024-01-17 NOTE — PROGRESS NOTES
V2.0    [facility] Progress Note      Name:  Christiano Miller /Age/Sex: 1945  (78 y.o. male)   MRN & CSN:  [id number] & [id number] Encounter Date/Time: 2024 1:07 PM EST   Location:  -A PCP: Musa Corral MD     Attending:Chris Pavon MD       Hospital Day: 4    Assessment and Recommendations   Christiano Miller is a 78 y.o. male with pmh of CKD, HTN, and melanoma who presents with Metastasis to brain (HCC) s/p right frontal lobe tumor resection 1/15      Plan:   Malignant melanoma: MRI brain  showed 5 intracranial metastatic lesions the largest was hemorrhagic in nature located within the right frontal lobe measuring 3.6 x 3.5 cm s/p resection of tumor per neurosurgery 1/15/2024.  Neurosurgery recommending gamma knife radiation versus whole brain as well as possible systemic treatment if patient agreeable.  Dr. Schmidt  following and pt has refused options including radiation and BRAF inhibitor in the clinic.  Continue Decadron, Keppra. Goal SBP <160. Prophylactic Heparin restarted  per NS  ESBL UTI: Urine culture  with E. coli ESBL.  Continue meropenem with plan for 1 week course and has history of ESBL.  End-stage renal disease on dialysis: Continue dialysis per nephrology. Received HD   Leukocytosis: persistent in the setting of steroids. On abx as above. Will monitor.   Chronic Anemia: hgb down some to 9.5, but overall stable without evidence of bleeding. Suspected related to acute illness, medications and recent surgery. Will monitor.       Diet ADULT DIET; Dysphagia - Minced and Moist; Moderately Thick (Honey)   DVT Prophylaxis [] Lovenox, [x]  Heparin, [] SCDs, [] Ambulation,  [] Eliquis, [] Xarelto  [] Coumadin   Code Status Full Code   Disposition From: home  Expected Disposition: ARU  Estimated Date of Discharge: ~- pending placement  Patient requires continued admission due to post op brain surgery, UTI   Surrogate Decision Maker/ POA  Son updated at  a \"code stroke\" or \"stroke alert\" been called?->No Reason for Exam: Evaluate hemorrhagic mass Follow-up FINDINGS: BRAIN/VENTRICLES: There is mild cerebral atrophy.  There is redemonstration of a large hemorrhagic metastatic lesion in the right frontal lobe with surrounding vasogenic edema.  There is a high density metastasis in the right temporal lobe which is stable.  There is a high density metastasis in the right frontal lobe.  There is no significant change in the appearance of the lesions when compared to the prior study.  Refer to the MRI report for more detailed evaluation of the metastatic disease. There is atherosclerotic calcification of the cavernous carotids. There is atherosclerotic disease of the distal vertebral arteries. ORBITS: The visualized portion of the orbits demonstrate no acute abnormality. SINUSES: Mild mucoperiosteal thickening of the ethmoid air cells. The remainder of the sinuses are clear. The mastoid air cells are clear. SOFT TISSUES/SKULL:  No acute abnormality of the visualized skull or soft tissues.     No change in the findings when compared to the prior study.  Redemonstration of several metastatic lesions in the brain parenchyma.  The largest metastatic lesion is in the right frontal lobe and contains hemorrhage and is surrounded by vasogenic edema as discussed above.     CT ABDOMEN PELVIS WO CONTRAST Additional Contrast? None    Result Date: 1/15/2024  EXAMINATION: CT OF THE ABDOMEN AND PELVIS WITHOUT CONTRAST 1/15/2024 12:38 pm TECHNIQUE: CT of the abdomen and pelvis was performed without the administration of intravenous contrast. Multiplanar reformatted images are provided for review. Automated exposure control, iterative reconstruction, and/or weight based adjustment of the mA/kV was utilized to reduce the radiation dose to as low as reasonably achievable. COMPARISON: 04/11/2023. HISTORY: ORDERING SYSTEM PROVIDED HISTORY: Evaluate metastatic disease TECHNOLOGIST PROVIDED

## 2024-01-17 NOTE — PROGRESS NOTES
Neurosurgery Progress Note  1/17/2024 11:11 AM    Right frontal craniotomy for resection of metastatic melanoma  POD: 1    Assessment and Plan:   S/P craniotomy, resection of tumor- tumor sent for pathology, continue keppra, decadron decreased to 4mg every 6 hours. 3% hypertonic discontinued, allow Na to trend to normal. Patient with improved speech today. He can leave ICU and begin dispo planning. Keep systolic BP<160. Keep HOB>30 degrees. Can work with therapy and have activity as tolerated. Prophylactic heparin added today.   Metastatic melanoma- oncology and rad onc. Are following. We recommend gamma knife radiation vs whole brain as well as restarting the patient on systemic treatment of his disease if he is agreeable. Would want radiation performed as soon as possible as regrowth of tumor is high.   ESRD- on dialysis, has been on dialysis for 7-8 years per family  ESBL positive e. Coli- present in urine. On meropenum.    Supervising physician: Saray Neely MD.  Dr. Neely was readily and continuously available by phone for direct consultation regarding the care of this patient.    Dragon dictation may have been used in the writing of this note. Spelling or word errors may have occurred. Please call for clarification if there are concerns.      Subjective:   Admit Date: 1/14/2024  PCP: Musa Corral MD    Patient sitting up in bed, was asleep, was easily awoken. He has no HA, N/V, no changes in vision. He has had some visual hallucinations. He saw bugs on the wall yesterday, today complains of seeing ghosts in the room. He is alert and oriented x4.     Data:   Scheduled Meds:   dexAMETHasone  4 mg IntraVENous Q6H    pantoprazole  40 mg Oral QAM AC    meropenem  500 mg IntraVENous Q24H    sennosides-docusate sodium  1 tablet Oral BID    levETIRAcetam  500 mg IntraVENous Q12H    sodium chloride flush  5-40 mL IntraVENous 2 times per day    sodium chloride flush  5-40 mL IntraVENous 2 times per day

## 2024-01-17 NOTE — PROGRESS NOTES
Inpatient Progress Note 1/17/2024        Christiano Miller  1945  [id number]      Assessment/Plan:  Christiano Miller is a 79 yo male with a past medical history of hypertension, CKD IV on HD- MWF, melanoma with metastasis. He was admitted s/p fall with an unsteady gait and dysarthria, MRI brain indicates multiple lesions of metastasis to brain.    Problem list    Encephalopathy, improved  Multiple brain metastases (some hemorrhagic) secondary to metastatic malignant melanoma, S/P brain resection frontal lobe lesion 1/15/2024   Post op pneumocephalus  History of malignant melanoma (prior wide excision back lesion 6/21, chemotherapy, pulmonary metastases established 11/22 via bronchoscopy (EBUS)  ESRD on HD  ESBL UTI with E coli 1/14  Protein calorie malnutrition, cachexia  History of prostatic hypertrophy bladder outlet obstruction        Neuro: Improved. GCS 15. Notable Left upper and lower extremity weakness. NIH 2 (baseline).   Cardio: Hemodynamically stable. No acute concerns. SR per tele.    Resp: 4L NC. Wean O2 for spo2 >93  GI: No acute concerns. ?Colon mass, GI following.   : UTI Ecoli, cont Merrem. Hx ESBL. sCr- 7.6. HD per Nephrology.   Heme: Stable hgb 9.5. Transfuse for Hgb <7.  ID: leukocytosis, CBC 13.7. Trend daily CBC. Cont Merrem for UTI. Afebrile.   Endo: Maintain Euglycemia. No hx DM.   MSK: OLSEN. PT/OT    Tubes/Lines/Drains:    CVC, L AV fistula.   Code Status:   Full code    Subjective:    Patient seen and examined at bedside in ICU. No overnight events. VSS. Oriented x3. NAD. No acute concerns. Denies pain.  Ok to transfer out of ICU       Review of Systems   Neurological:  Positive for weakness.   All other systems reviewed and are negative.       Physical Exam:            BP (!) 169/68   Pulse 71   Temp 97.8 °F (36.6 °C) (Oral)   Resp 21   Ht 1.727 m (5' 8\")   Wt 54.3 kg (119 lb 11.4 oz)   SpO2 99%   BMI 18.20 kg/m²     General: NAD  Eyes: EOMI  ENT: neck supple  Cardiovascular:  moderately distended raising the question of some degree of outlet obstruction.     CT CHEST WO CONTRAST    Result Date: 1/15/2024  EXAMINATION: CT OF THE CHEST WITHOUT CONTRAST 1/15/2024 12:38 pm TECHNIQUE: CT of the chest was performed without the administration of intravenous contrast. Multiplanar reformatted images are provided for review. Automated exposure control, iterative reconstruction, and/or weight based adjustment of the mA/kV was utilized to reduce the radiation dose to as low as reasonably achievable. COMPARISON: 12/04/2023. HISTORY: ORDERING SYSTEM PROVIDED HISTORY: Evaluate metastatic disease TECHNOLOGIST PROVIDED HISTORY: Reason for exam:->Evaluate metastatic disease Reason for Exam: Evaluate metastatic disease FINDINGS: Mediastinum: A right hilar lymph node measures 3.2 x 3.9 cm versus 2.8 x 2.6 cm previously.  Lymph nodes in the mediastinum are within normal limits. There is borderline cardiomegaly.  There is no pericardial effusion. Lungs/pleura: Evident nodule medially in the right upper lobe is larger than before measuring up to 2.9 cm versus 2.5 cm by my measurements. Emphysematous changes are again noted.  The the calcified granulomata in the right lower lobe are again noted.  There is no new lung nodule there is a. There is motion artifact.  No confluent airspace consolidation or pleural effusion is noted. Upper Abdomen: A CT of the abdomen pelvis performed same time as this study. See that report separately. Soft Tissues/Bones: No fracture or destructive bone lesion is identified.     1. Disease progression.  A right upper lobe nodule is larger than before measuring up to 2.9 cm versus 2.5 cm previously.  Right axillary lymphadenopathy is also increased where lymph node measures up to 3.2 cm in shortest dimension versus 2.6 cm previously. 2. Emphysema. 3. Borderline cardiomegaly.       Recent Results (from the past 24 hour(s))   POCT Glucose    Collection Time: 01/16/24 11:37 AM   Result

## 2024-01-17 NOTE — PLAN OF CARE
Problem: Pain  Goal: Verbalizes/displays adequate comfort level or baseline comfort level  Outcome: Progressing     Problem: Safety - Adult  Goal: Free from fall injury  Outcome: Progressing     Problem: Nutrition Deficit:  Goal: Optimize nutritional status  Outcome: Progressing

## 2024-01-17 NOTE — CONSULTS
24 hour central line rounding on CVC completed. Sterile dressing change completed per protocol. Patient continues to meet the following criteria for central vascular access: Patient still requires three different drips and labs.     Consult the Vascular Access Team for questions, concerns, or change in patient's condition.

## 2024-01-17 NOTE — PROGRESS NOTES
Pharmacist Review and Automatic Dose Adjustment of Prophylactic Enoxaparin    Reviewed reason(s) for admission/hospital problem list    The reviewing pharmacist has made an adjustment to the ordered enoxaparin dose or converted to UFH per the approved [facility] protocol and table as identified below.        Christiano Miller is a 78 y.o. male.     Recent Labs     01/16/24  0510 01/16/24  0930 01/17/24  0445   CREATININE 6.2* 6.6* 7.6*       Estimated Creatinine Clearance: 6 mL/min (A) (based on SCr of 7.6 mg/dL (H)).    Recent Labs     01/16/24  0510 01/17/24  0445   HGB 10.0* 9.5*   HCT 30.5* 30.0*    217     No results for input(s): \"INR\" in the last 72 hours.    Height:   Ht Readings from Last 1 Encounters:   01/14/24 1.727 m (5' 8\")     Weight:  Wt Readings from Last 1 Encounters:   01/16/24 54.3 kg (119 lb 11.4 oz)               Plan: Based upon the patient's weight and renal function    Ordered: Enoxaparin 40mg SUBQ Daily    Changed/converted to    New Order: Heparin 5,000 units SUBQ TID      Thank you,  Gay Sanchez [facility]  1/17/2024, 12:10 PM

## 2024-01-17 NOTE — CARE COORDINATION
ARU pre-cert currently pending at this time.  Will follow for determination.  Pending ref# [id number].

## 2024-01-17 NOTE — PROGRESS NOTES
ONCOLOGY HEMATOLOGY  PROGRESS NOTE    2024  8:03 AM    Patient:    Christiano Miller  : 1945   78 y.o.             MRN: [id number]  Admitted: 2024  6:36 AM ATT: Chris Pavon MD   -A  AdmitDx: Intracranial mass [R90.0]  Coccyx pain [M53.3]  Confusion [R41.0]  Metastasis to brain (HCC) [C79.31]  Fall, initial encounter [W19.XXXA]  PCP: Musa Corral MD    HISTORY OF PRESENT ILLNESS   Christiano Miller is a 78 y.o. male who was admitted after attaining a fall, unsteady gait and also dysarthria.  Denies any syncopal episode.  Denies any vision changes.  Reported dizziness.  No other focal weakness.  CT scan of the head on admission with multiple hypodense lesions mostly hemorrhagic brain metastasis.  This was followed by MRI of the brain which revealed 5 intracranial metastasis, largest of which is hemorrhagic in the right frontal lobe.  With surrounding edema.  Got a loading dose of steroids followed by maintenance steroids.  Has been evaluated by neurosurgery.      BACKGROUND ONCOLOGY HISTORY:  Patient known to Dr. Lugo, with diagnosis of melanoma, stage IIa in May 2021.  He had wide excision with sentinel lymph node biopsy.  He was under surveillance until 2022 when he was diagnosed with metastatic melanoma.  BRAF could not be performed secondary to insufficient tissue.  He started immunotherapy  and looks like he received until 2023.  He did have response as per PET scan in May 2023.  He then stopped Keytruda secondary to adverse effects.  Refused radiation therapy or BRAF inhibitor.  Repeat CT scan of the chest with interval increase in the size of the medial right upper lobe lung nodule.  In addition there were new large right hilar enlarged lymph nodes.    : CBC with wbc 14, hb 11, plt 261  I discuss with him about RT to  the brain.   Seen by nephrologist for kidney disease.    2024 awake, alert, confused.    1/15/2024 CT chest:    1. Disease progression.  A right upper lobe nodule is larger than before  measuring up to 2.9 cm versus 2.5 cm previously.  Right axillary  lymphadenopathy is also increased where lymph node measures up to 3.2 cm in shortest dimension versus 2.6 cm previously.  2. Emphysema.  3. Borderline cardiomegaly.    1/15/2024 CT AP  1. No evidence of metastatic disease in the abdomen or pelvis.  2. Colonic diverticulosis and a moderate amount of stool in the colon  suggesting constipation.  There is again an abrupt caliber change at the mid  sigmoid portion of the colon, but no definitive abnormal thickening is noted  in this area.  3. Severe prostatomegaly and persistent bladder wall thickening which likely  represents associated hypertrophy.  The bladder is also moderately distended raising the question of some degree of outlet obstruction.  He has colonoscopy by Dr Groves in 2023.  He underwent right frontal lobe tumor resection and hematoma evacuation.  I will try to meet family in AM.    Interval History    1/17/24  Pt seen and examined this morning.  He is alert and awake, somewhat confused. Continues to ask if I am \"Josee.\" No family at bedside this morning.  Asked nurse to PS me if they are available before noon, otherwise ask if they can be available tomorrow at 8am.     Urine culture positive for ESBL E Coli. Has been on meropenem dosed for HD since 1/15.       Hgb 9.3, plt 217k, WBC 13.7.      PHYSICAL EXAM    Vitals: BP (!) 134/57   Pulse 62   Temp 98.5 °F (36.9 °C) (Oral)   Resp 16   Ht 1.727 m (5' 8\")   Wt 54.3 kg (119 lb 11.4 oz)   SpO2 95%   BMI 18.20 kg/m²     CONSTITUTIONAL: alert, awake. Confused.   EYES: ORTIZ, + pallor, no icterus  ENT: ATNC  NECK: No JVD  HEMATOLOGIC/LYMPHATIC: no cervical, supraclavicular or axillary lymphadenopathy   LUNGS: CTA bilaterally.  CARDIOVASCULAR: s1s2 rrr no murmurs  ABDOMEN: soft ntnd bs pos  NEUROLOGIC: moving all 4 ext  SKIN: No rash  EXTREMITIES: no LE edema

## 2024-01-17 NOTE — PROGRESS NOTES
Nephrology Progress Note        [address], [address]  [place] [address]  Phone: [phone]  Office Hours: 8:30AM - 4:30PM  Monday - Friday 1/17/2024 6:44 AM  Subjective:   Admit Date: 1/14/2024  PCP: Musa Corral MD  Interval History: on NC  Resting      Diet: ADULT DIET; Dysphagia - Minced and Moist; Moderately Thick (Honey)      Data:   Scheduled Meds:   dexAMETHasone  4 mg IntraVENous Q6H    pantoprazole  40 mg Oral QAM AC    meropenem  500 mg IntraVENous Q24H    sennosides-docusate sodium  1 tablet Oral BID    levETIRAcetam  500 mg IntraVENous Q12H    sodium chloride flush  5-40 mL IntraVENous 2 times per day    sodium chloride flush  5-40 mL IntraVENous 2 times per day    carvedilol  3.125 mg Oral BID    isosorbide mononitrate  30 mg Oral Daily    megestrol  40 mg Oral BID    sevelamer  800 mg Oral TID WC    insulin lispro  0-4 Units SubCUTAneous TID WC    insulin lispro  0-4 Units SubCUTAneous Nightly     Continuous Infusions:   sodium chloride      sodium chloride      dextrose       PRN Meds:ipratropium 0.5 mg-albuterol 2.5 mg, sodium chloride flush, sodium chloride, ondansetron **OR** ondansetron, polyethylene glycol, acetaminophen **OR** acetaminophen, sodium chloride flush, sodium chloride, hydrALAZINE, glucose, dextrose bolus **OR** dextrose bolus, glucagon (rDNA), dextrose  I/O last 3 completed shifts:  In: 1721.3 [P.O.:600; IV Piggyback:621.3]  Out: 2270 [Urine:770]  I/O this shift:  In: 67 [IV Piggyback:67]  Out: -     Intake/Output Summary (Last 24 hours) at 1/17/2024 0644  Last data filed at 1/16/2024 2109  Gross per 24 hour   Intake 700.08 ml   Output 225 ml   Net 475.08 ml       CBC:   Recent Labs     01/15/24  0445 01/16/24  0510 01/17/24  0445   WBC 11.3* 12.7* 13.7*   HGB 11.0* 10.0* 9.5*    257 217       BMP:    Recent Labs     01/16/24  0510 01/16/24  0930 01/17/24  0445    147* 142   K 4.6 4.8 5.1    109 106   CO2 21 21 20*   BUN 58* 62* 80*

## 2024-01-17 NOTE — PROGRESS NOTES
TISSUES: Bone marrow signal intensity is normal.     Five intracranial metastatic lesions as described above, the largest of which is hemorrhagic and located within the right frontal lobe measuring 3.6 x 3.4 cm. The findings were sent to the [facility] at 4:52 pm on 1/14/2024 to be communicated to a licensed caregiver.     XR SACRUM COCCYX (MIN 2 VIEWS)    Result Date: 1/14/2024  EXAMINATION: THREE XRAY VIEWS OF THE SACRUM/COCCYX 1/14/2024 7:55 am COMPARISON: None. HISTORY: ORDERING SYSTEM PROVIDED HISTORY: fall, pain TECHNOLOGIST PROVIDED HISTORY: Reason for exam:->fall, pain FINDINGS: The alignment of the sacrum and coccyx is within normal limits.  No acute fractures are seen.  There is no diastases of the sacroiliac joints or pubic symphysis.  Vascular calcifications are seen.     1. No acute abnormality involving the sacrum or coccyx.     XR CHEST (2 VW)    Result Date: 1/14/2024  EXAMINATION: TWO XRAY VIEWS OF THE CHEST 1/14/2024 7:55 am COMPARISON: 12/25/2022. HISTORY: ORDERING SYSTEM PROVIDED HISTORY: cough TECHNOLOGIST PROVIDED HISTORY: Reason for exam:->cough FINDINGS: The heart size and pulmonary vasculature are stable.  There are calcified lymph nodes and granulomas.  No acute infiltrates are seen.  No pneumothoraces are seen.     No acute cardiopulmonary process.     CT HEAD WO CONTRAST    Result Date: 1/14/2024  EXAMINATION: CT OF THE HEAD WITHOUT CONTRAST  1/14/2024 8:12 am TECHNIQUE: CT of the head was performed without the administration of intravenous contrast. Automated exposure control, iterative reconstruction, and/or weight based adjustment of the mA/kV was utilized to reduce the radiation dose to as low as reasonably achievable. COMPARISON: None. HISTORY: ORDERING SYSTEM PROVIDED HISTORY: HEAD TRAUMA, CLOSED, MILD, GCS >= 13, NO RISK FACTORS, NEURO EXAM NORMAL TECHNOLOGIST PROVIDED HISTORY: Has a \"code stroke\" or \"stroke alert\" been called?->No Reason for  exam:->fall, confused Decision Support Exception - unselect if not a suspected or confirmed emergency medical condition->Emergency Medical Condition (MA) Reason for Exam: fall, confused; Head trauma, closed, mild, GCS >= 13, no risk factors, neuro exam normal FINDINGS: BRAIN/VENTRICLES: There is a mixed density hyperdense mass involving the left temporal lobe measuring 2.1 x 1.5 cm.  There is surrounding low attenuation. There is a larger hyperdense mass in the high right frontal lobe measuring 3.3 x 3.1 cm with surrounding low-attenuation.  There is a 3rd hyperdense lesion seen adjacent to the larger right frontal lesion measuring 1 cm.  In the left parietal lobe, there is frondlike low-attenuation.  No mass-effect or midline shift is seen.  No acute areas of infarction are noted.  The cerebral sulci and ventricles are enlarged compatible with diffuse cerebral atrophy. There is low-attenuation in the periventricular white matter and deep white matter of the brain representing changes of chronic small vessel ischemic disease. ORBITS: The visualized portion of the orbits demonstrate no acute abnormality. SINUSES: The visualized paranasal sinuses and mastoid air cells demonstrate no acute abnormality.  There is mucosal disease involving the maxillary, ethmoid, sphenoid and frontal sinuses. SOFT TISSUES/SKULL: No acute abnormality of the visualized skull or soft tissues.     1. Multiple hyperdense intra-axial masses with surrounding edema but no mass effect or midline shift.  These findings are compatible with hemorrhagic metastases from his known melanoma.  I would recommend MRI brain with contrast for further evaluation.       Recent Results (from the past 24 hour(s))   POCT Glucose    Collection Time: 01/16/24  5:22 PM   Result Value Ref Range    POC Glucose 143 (H) 70 - 99 MG/DL   POCT Glucose    Collection Time: 01/16/24  8:22 PM   Result Value Ref Range    POC Glucose 121 (H) 70 - 99 MG/DL   CBC with Auto

## 2024-01-17 NOTE — PROGRESS NOTES
Tolerated 3 hour dialysis treatment well, removed 2 liters with treatment.  Left AVF cannulated without complication.        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 (HCC)    Lung mass    Acute respiratory failure (HCC)    Moderate malnutrition (HCC)    Chronic kidney disease (CKD), stage V (HCC) [663102]    Protein calorie malnutrition    History of colon polyps    Metastasis to brain (HCC)    Fall         Treatment:  Hemodialysis 1:1  Priority: Routine  Location: ICU    Diabetic: no  NPO: No  Isolation Precautions: Contact     Consent for Treatment Verified: Yes  Blood Consent Verified: Not Applicable     Safety Verified: Identify (I), Consent (C), Equipment (E), HepB Status (B), Orders Complete (O), Access Verified (A), and Timeliness (T)  Time out performed prior to access at 1210 hours.    Report Received from Primary RN at 1130 hours.  Primary RN (First Initial, Last Name, Title): ANY Conklin RN  Incapacitated Nurse Education Completed: Not Applicable     HBsAg ONLY:  Date Drawn: January 3, 2024       Results: Negative  HBsAb:  Date Drawn:  2024       Results: Immune >10    Order  Dialysis Bath  K+ (Potassium): 2  Ca+ (Calcium): 2.5  Na+ (Sodium): 140  HCO3 (Bicarb): 35  Bicarbonate Concentrate Lot No.: [id number]  Acid Concentrate Lot No.: [id number]     Na+ Modeling: Not Applicable  Dialyzer: F180  Dialysate Temperature (C):  35  Blood Flow Rate (BFR):  300   Dialysate Flow Rate (DFR):   700

## 2024-01-18 PROBLEM — E43 SEVERE MALNUTRITION (HCC): Status: ACTIVE | Noted: 2024-01-18

## 2024-01-18 LAB
ANION GAP SERPL CALCULATED.3IONS-SCNC: 14 MMOL/L (ref 7–16)
BASOPHILS ABSOLUTE: 0 K/CU MM
BASOPHILS RELATIVE PERCENT: 0 % (ref 0–1)
BUN SERPL-MCNC: 48 MG/DL (ref 6–23)
CALCIUM IONIZED: 4.64 MG/DL (ref 4.48–5.28)
CALCIUM SERPL-MCNC: 7.6 MG/DL (ref 8.3–10.6)
CHLORIDE BLD-SCNC: 101 MMOL/L (ref 99–110)
CO2: 26 MMOL/L (ref 21–32)
CREAT SERPL-MCNC: 5.3 MG/DL (ref 0.9–1.3)
DIFFERENTIAL TYPE: ABNORMAL
EOSINOPHILS ABSOLUTE: 0 K/CU MM
EOSINOPHILS RELATIVE PERCENT: 0 % (ref 0–3)
GFR SERPL CREATININE-BSD FRML MDRD: 11 ML/MIN/1.73M2
GLUCOSE BLD-MCNC: 112 MG/DL (ref 70–99)
GLUCOSE BLD-MCNC: 124 MG/DL (ref 70–99)
GLUCOSE BLD-MCNC: 128 MG/DL (ref 70–99)
GLUCOSE BLD-MCNC: 143 MG/DL (ref 70–99)
GLUCOSE SERPL-MCNC: 118 MG/DL (ref 70–99)
HCT VFR BLD CALC: 29.9 % (ref 42–52)
HEMOGLOBIN: 9.7 GM/DL (ref 13.5–18)
IMMATURE NEUTROPHIL %: 0.6 % (ref 0–0.43)
IONIZED CA: 1.16 MMOL/L (ref 1.12–1.32)
LYMPHOCYTES ABSOLUTE: 0.8 K/CU MM
LYMPHOCYTES RELATIVE PERCENT: 8.3 % (ref 24–44)
MAGNESIUM: 2.2 MG/DL (ref 1.8–2.4)
MCH RBC QN AUTO: 30.5 PG (ref 27–31)
MCHC RBC AUTO-ENTMCNC: 32.4 % (ref 32–36)
MCV RBC AUTO: 94 FL (ref 78–100)
MONOCYTES ABSOLUTE: 0.6 K/CU MM
MONOCYTES RELATIVE PERCENT: 6.5 % (ref 0–4)
NUCLEATED RBC %: 0 %
PDW BLD-RTO: 13.2 % (ref 11.7–14.9)
PHOSPHORUS: 5.1 MG/DL (ref 2.5–4.9)
PLATELET # BLD: 186 K/CU MM (ref 140–440)
PMV BLD AUTO: 9.5 FL (ref 7.5–11.1)
POTASSIUM SERPL-SCNC: 4.5 MMOL/L (ref 3.5–5.1)
RBC # BLD: 3.18 M/CU MM (ref 4.6–6.2)
SEGMENTED NEUTROPHILS ABSOLUTE COUNT: 8.3 K/CU MM
SEGMENTED NEUTROPHILS RELATIVE PERCENT: 84.6 % (ref 36–66)
SODIUM BLD-SCNC: 141 MMOL/L (ref 135–145)
TOTAL IMMATURE NEUTOROPHIL: 0.06 K/CU MM
TOTAL NUCLEATED RBC: 0 K/CU MM
WBC # BLD: 9.8 K/CU MM (ref 4–10.5)

## 2024-01-18 PROCEDURE — 97112 NEUROMUSCULAR REEDUCATION: CPT

## 2024-01-18 PROCEDURE — 1200000000 HC SEMI PRIVATE

## 2024-01-18 PROCEDURE — 6360000002 HC RX W HCPCS: Performed by: PHYSICIAN ASSISTANT

## 2024-01-18 PROCEDURE — 82962 GLUCOSE BLOOD TEST: CPT

## 2024-01-18 PROCEDURE — 6360000002 HC RX W HCPCS: Performed by: NURSE PRACTITIONER

## 2024-01-18 PROCEDURE — 83735 ASSAY OF MAGNESIUM: CPT

## 2024-01-18 PROCEDURE — 6370000000 HC RX 637 (ALT 250 FOR IP): Performed by: PHYSICIAN ASSISTANT

## 2024-01-18 PROCEDURE — 2580000003 HC RX 258: Performed by: PHYSICIAN ASSISTANT

## 2024-01-18 PROCEDURE — 36592 COLLECT BLOOD FROM PICC: CPT

## 2024-01-18 PROCEDURE — 99024 POSTOP FOLLOW-UP VISIT: CPT | Performed by: PHYSICIAN ASSISTANT

## 2024-01-18 PROCEDURE — 82330 ASSAY OF CALCIUM: CPT

## 2024-01-18 PROCEDURE — 80048 BASIC METABOLIC PNL TOTAL CA: CPT

## 2024-01-18 PROCEDURE — 85025 COMPLETE CBC W/AUTO DIFF WBC: CPT

## 2024-01-18 PROCEDURE — 99232 SBSQ HOSP IP/OBS MODERATE 35: CPT | Performed by: INTERNAL MEDICINE

## 2024-01-18 PROCEDURE — 6370000000 HC RX 637 (ALT 250 FOR IP): Performed by: NEUROLOGICAL SURGERY

## 2024-01-18 PROCEDURE — 97530 THERAPEUTIC ACTIVITIES: CPT

## 2024-01-18 PROCEDURE — 97535 SELF CARE MNGMENT TRAINING: CPT

## 2024-01-18 PROCEDURE — 84100 ASSAY OF PHOSPHORUS: CPT

## 2024-01-18 PROCEDURE — 94761 N-INVAS EAR/PLS OXIMETRY MLT: CPT

## 2024-01-18 RX ORDER — DIPHENHYDRAMINE HYDROCHLORIDE 50 MG/ML
25 INJECTION INTRAMUSCULAR; INTRAVENOUS ONCE
Status: COMPLETED | OUTPATIENT
Start: 2024-01-18 | End: 2024-01-18

## 2024-01-18 RX ORDER — DEXAMETHASONE SODIUM PHOSPHATE 4 MG/ML
4 INJECTION, SOLUTION INTRA-ARTICULAR; INTRALESIONAL; INTRAMUSCULAR; INTRAVENOUS; SOFT TISSUE EVERY 8 HOURS
Status: DISCONTINUED | OUTPATIENT
Start: 2024-01-18 | End: 2024-01-19 | Stop reason: HOSPADM

## 2024-01-18 RX ADMIN — SEVELAMER CARBONATE 800 MG: 800 TABLET, FILM COATED ORAL at 13:45

## 2024-01-18 RX ADMIN — DEXAMETHASONE SODIUM PHOSPHATE 4 MG: 4 INJECTION, SOLUTION INTRAMUSCULAR; INTRAVENOUS at 04:05

## 2024-01-18 RX ADMIN — HEPARIN SODIUM 5000 UNITS: 5000 INJECTION INTRAVENOUS; SUBCUTANEOUS at 13:45

## 2024-01-18 RX ADMIN — DEXAMETHASONE SODIUM PHOSPHATE 4 MG: 4 INJECTION, SOLUTION INTRAMUSCULAR; INTRAVENOUS at 08:39

## 2024-01-18 RX ADMIN — LEVETIRACETAM 500 MG: 100 INJECTION, SOLUTION INTRAVENOUS at 08:39

## 2024-01-18 RX ADMIN — SENNOSIDES, DOCUSATE SODIUM 1 TABLET: 8.6; 5 TABLET ORAL at 21:39

## 2024-01-18 RX ADMIN — DIPHENHYDRAMINE HYDROCHLORIDE 25 MG: 50 INJECTION, SOLUTION INTRAMUSCULAR; INTRAVENOUS at 21:40

## 2024-01-18 RX ADMIN — SEVELAMER CARBONATE 800 MG: 800 TABLET, FILM COATED ORAL at 17:19

## 2024-01-18 RX ADMIN — DEXAMETHASONE SODIUM PHOSPHATE 4 MG: 4 INJECTION, SOLUTION INTRAMUSCULAR; INTRAVENOUS at 17:19

## 2024-01-18 RX ADMIN — SODIUM CHLORIDE, PRESERVATIVE FREE 10 ML: 5 INJECTION INTRAVENOUS at 21:54

## 2024-01-18 RX ADMIN — SODIUM CHLORIDE, PRESERVATIVE FREE 10 ML: 5 INJECTION INTRAVENOUS at 07:31

## 2024-01-18 RX ADMIN — SENNOSIDES, DOCUSATE SODIUM 1 TABLET: 8.6; 5 TABLET ORAL at 08:40

## 2024-01-18 RX ADMIN — CARVEDILOL 3.12 MG: 6.25 TABLET, FILM COATED ORAL at 21:39

## 2024-01-18 RX ADMIN — SEVELAMER CARBONATE 800 MG: 800 TABLET, FILM COATED ORAL at 08:40

## 2024-01-18 RX ADMIN — CARVEDILOL 3.12 MG: 6.25 TABLET, FILM COATED ORAL at 08:40

## 2024-01-18 RX ADMIN — SODIUM CHLORIDE, PRESERVATIVE FREE 10 ML: 5 INJECTION INTRAVENOUS at 07:33

## 2024-01-18 RX ADMIN — HEPARIN SODIUM 5000 UNITS: 5000 INJECTION INTRAVENOUS; SUBCUTANEOUS at 06:17

## 2024-01-18 RX ADMIN — HEPARIN SODIUM 5000 UNITS: 5000 INJECTION INTRAVENOUS; SUBCUTANEOUS at 21:39

## 2024-01-18 RX ADMIN — MEROPENEM 500 MG: 500 INJECTION, POWDER, FOR SOLUTION INTRAVENOUS at 17:29

## 2024-01-18 RX ADMIN — ISOSORBIDE MONONITRATE 30 MG: 30 TABLET, EXTENDED RELEASE ORAL at 08:40

## 2024-01-18 RX ADMIN — MEGESTROL ACETATE 40 MG: 20 TABLET ORAL at 08:41

## 2024-01-18 RX ADMIN — MEGESTROL ACETATE 40 MG: 20 TABLET ORAL at 21:40

## 2024-01-18 RX ADMIN — PANTOPRAZOLE SODIUM 40 MG: 40 TABLET, DELAYED RELEASE ORAL at 06:18

## 2024-01-18 RX ADMIN — LEVETIRACETAM 500 MG: 100 INJECTION, SOLUTION INTRAVENOUS at 21:39

## 2024-01-18 ASSESSMENT — PAIN SCALES - GENERAL
PAINLEVEL_OUTOF10: 0

## 2024-01-18 NOTE — PROGRESS NOTES
Physical Therapy  Name: Christiano Miller MRN: [id number] :   1945   Date:  2024   Admission Date: 2024 Room:  [address] [place], [place] [address]   Restrictions/Precautions:        HOB >30 at all times, general precautions, fall risk     Communication with other providers:  Marco BOLDEN states pt is ok to see for therapy.   COTX with OT Alley per patient tolerance and safety with rehab. [employer] student present to assist as well    Discharge recommendation: ARU    Subjective:  Patient states: Patient is pleasant and agreeable for rehab today, he states \"Hold on Rachelle!\" D/t patient wanting more time to process transfers. He is A&O x 4 but demos decrease processing with activities today  Pain:   Location, Type, Intensity (0/10 to 10/10):  shin discomfort with contreras steady use    Objective:    Observation:  Patient is in bed with HOB in high fowlers    Vitals : Patient is on Room air   HR - 85 at rest, 100-115 with activities  BP - 127/55 (75) before transfers, 132/71 (83) seated, 110/56 on BSC, 130/63(81) after returning to bed, 115/56 (71) at end of session  SpO2 - poor pulse ox reading, patient does not change in alertness or confusion. RN replaces pulse ox and SpO2 pleth 95-98%    Treatment, including education/measures:    Transfers with line management of Tele Monitor, Pulse Ox, BP Cuff, Nunez   Supine to sit :Mod A x 2, with HOB elevated. Assist for BLE and trunk management  Seated balance: Patient requires Min-Mod A for static sitting. Patient needs frequent ,annual and verbal cues to self correct posterior lean.    Neuro-samuel: Verbal and tactile cues for seated upright posture. Manual assist needed for trunk adjustment to midline.   Sit to supine :Max A x 2 for trunk and BLE management into HOB elevated to 30 degrees  Scooting :Seated scooting Mod A x 2 to EOB  Sit to stand :From elevated Mod A x 2 from EOB x 2 trials to RW. Max A x 2 from BSC to RW, partial stand achieved. From BSC to contreras steady x 2 trails Max A x 2.

## 2024-01-18 NOTE — DISCHARGE INSTR - DIET
Good nutrition is important when healing from an illness, injury, or surgery.  Follow any nutrition recommendations given to you during your hospital stay.   If you were given an oral nutrition supplement while in the hospital, continue to take this supplement at home.  You can take it with meals, in-between meals, and/or before bedtime. These supplements can be purchased at most local grocery stores, pharmacies, and chain super-stores.   If you have any questions about your diet or nutrition, call the hospital and ask for the dietitian.  Due to malnutrition diagnosis, after discharge, RD recommends patient to drink high calorie, high protein oral nutrition supplements of greater than 200 calories per serving with at least or greater than 10 gm protein per serving, at least 2-3 times per day, to promote increased po intakes and weight gain. Coupons and High Calorie, High Protein Nutrition Therapy handouts provided.     Suggestions to follow at home to improve appetite:   Aim for small, frequent eating sessions. (I.e. 5-8 times per day of smaller portions)   Schedule eating times (I.e. every 2-4 hours would have a snack)   Enhance the eating environment (I.e. Eating with family and friends, watching favorite movie, or listening to favorite music with meal)   Identify problem smells taste, textures, and temperatures  Choose foods that are easy to chew and swallow   Avoid drinking fluids during eating session. Save drinks and sips between meals.     There is no need to apply all of these strategies at once.     Many patients benefit from adding 1 or 2 suggestions at a time to see how they affect their ability to eat, and then making an informed decision on how to proceed.   Please read Nutrition handout below:     High Calorie, High Protein Nutrition Therapy from Nutrition Care Manual     A high-calorie, high-protein diet has been recommended to you. Your registered dietitian nutritionist (RDN) may have recommended  vegetables, meats, chicken, fish or tofu in olive or canola oil.   Add olive oil, other vegetable oils, butter or margarine to soups, vegetables, potatoes, cooked cereal, rice, pasta, bread, crackers, pancakes, or waffles.  Snack on olives or add to pasta, pizza, or salad.  Add avocado or guacamole to your salads, sandwiches, and other entrees.  Include fatty fish such as salmon in your weekly meal plan.  For general food safety tips, especially for clients with immunocompromised conditions, ask your RDN for the Food Safety Nutrition Therapy handout.  Small Meal and Snack Ideas  These snacks and meals are recommended when you have to eat but aren’t necessarily hungry.  They are good choices because they are high in protein and high in calories.   2 silvestre crackers  2 tablespoons peanut or other nut butter  1 cup milk 2 slices whole wheat toast topped with:  ½ avocado, mashed  Seasoning of your choice   ¾ cup Greek yogurt  ½ cup fruit  ¼ cup granola 2 deviled egg halves  5 whole wheat crackers   1 cup cream of tomato soup  ½ grilled cheese sandwich 1 toasted waffle topped with:  2 tablespoons peanut or nut butter  1 tablespoon jam   Trail mix made with: ¼ cup nuts  ¼ cup dried fruit  ¼ cup cold cereal, any variety ¾ cup oatmeal or cream of wheat cereal  1 tablespoon peanut or nut butter  ½ cup diced fruit     High-Calorie, High-Protein Sample 1-Day Menu View Nutrient Info  Breakfast 1 egg, scrambled  1 ounce cheddar cheese  1 English muffin, whole wheat  1 tablespoon margarine  1 tablespoon jam  ½ cup orange juice, fortified with calcium and vitamin D   Morning Snack 1 tablespoon peanut butter  1 banana  1 cup 1% milk   Lunch Tuna salad sandwich made with: 2 slices bread, whole wheat  3 ounces tuna mixed with:  1 tablespoon mayonnaise  ½ cup pudding   Afternoon Snack ¼ cup hummus  ½ cup carrots  1 yina   Evening Meal Enchilada casserole made with: 2 corn tortillas  3 ounces ground beef, cooked  ½ cup black beans,

## 2024-01-18 NOTE — PROGRESS NOTES
Occupational Therapy    Occupational Therapy Treatment Note    Name: Christiano Miller MRN: [id number] :   1945   Date:  2024   Admission Date: 2024 Room:  /0-A     Per OTR/L Discharge Recommendation: Inpatient Rehabilitation     Primary Problem:   The primary encounter diagnosis was Fall, initial encounter. Diagnoses of Intracranial mass, Confusion, Coccyx pain, and Hematoma were also pertinent to this visit.     Restrictions/Precautions:  fall risk and contact precautions, HOB >30 at all times, Keep systolic BP<160     Communication with other providers: RN approved session. Co tx with PTA for safety.     Subjective:  Patient states:  \"Hold on Rachelle\" while attempting to stand.   Pain:   Location, Type, Intensity (0/10 to 10/10):  No pain reported at this time.     Objective:    Observation: Pt supine in bed with HOB at approx 30 degrees upon arrival. Pts son and daughter in the room at bed side.     Objective Measures:  Pt alert and oriented to person, place, situation, and month. Pt was not oriented to year.   HR - 85 at rest, 100-115 with activities  BP - 127/55 (75) before transfers, 132/71 (83) seated, 110/56 on BSC, 130/63(81) after returning to bed, 115/56 (71) at end of session  SpO2 - poor pulse ox reading, patient does not change in alertness or confusion. RN replaces pulse ox and SpO2 pleth 95-98%    Treatment, including education:  Neuro-Muscular re-education:  Cues were given for position, posture, kinesthetic sense, safety, recruitment, and rationale.  Cues were verbal and/or tactile.  Self Care Training:   Cues were given for safety, sequence, UE/LE placement, visual cues, and balance.    Activities performed today included UB/LB dressing tasks, toileting, hand hygiene at sink    Pt was supine in bed eating lunch upon arrival. Pt completed supine to sit to get to EOB with HOB elevated and MOD A x2 for trunk elevation, hip pivot, and last 25% of LE movement as well as MOD verbal

## 2024-01-18 NOTE — PROGRESS NOTES
Comprehensive Nutrition Assessment    Type and Reason for Visit:  Reassess    Nutrition Recommendations/Plan:   Continue current oral diet, texture/consistency per SLP  Offer frozen oral nutrition supplement BID  Encourage adequate intakes, offer double portions, additional food items as desired  Monitor weights, po intakes, labs, POC     Malnutrition Assessment:  Malnutrition Status:  Severe malnutrition (01/18/24 1324)    Context:  Chronic Illness     Findings of the 6 clinical characteristics of malnutrition:  Energy Intake:  75% or less estimated energy requirements for 1 month or longer  Weight Loss:  Greater than 5% over 1 month     Body Fat Loss:  Mild body fat loss Orbital, Buccal region   Muscle Mass Loss:  Mild muscle mass loss (moderate) Temples (temporalis), Clavicles (pectoralis & deltoids), Thigh (quadraceps), Hand (interosseous)  Fluid Accumulation:  No significant fluid accumulation     Strength:  Not Performed    Nutrition Assessment:    Pt ate 100% of lunch today, requesting additional food items, ordered another small meal. Documented intakes show 1-25% of 2 recent meals. Pt reports #, does think he's lost a little bit of weight recently. Requires help setting up tray before meals. NFPE performed, mild wasting noted, meets criteria for malnutrition. Pt willing to try frozen oral supp BID. Encourage him to order additional food, double portions as needed. Continue to follow at high nutrition risk at this time.    Nutrition Related Findings:    +megace, renvela, bowel meds; POC glucose 100-128, GFR 11 Wound Type: None       Current Nutrition Intake & Therapies:    Average Meal Intake: 1-25%, %  Average Supplements Intake: None Ordered  ADULT DIET; Dysphagia - Minced and Moist; Moderately Thick (Honey)    Anthropometric Measures:  Height: 172.7 cm (5' 8\")  Ideal Body Weight (IBW): 154 lbs (70 kg)    Admission Body Weight: 57 kg (125 lb 10.6 oz)  Current Body Weight: 54.1 kg (119 lb

## 2024-01-18 NOTE — PROGRESS NOTES
Resting on room air  Was able to get 2L off in HD yesterday, will plan next one tomorrow as long as he is table  Awaiting ARU for rehab  Will follow    Thank you    Patient Active Problem List    Diagnosis Date Noted    Chronic kidney disease (CKD), stage V ([facility]) [131211] 03/09/2023    Protein calorie malnutrition 03/09/2023    Moderate malnutrition (HCC) 12/27/2022    Acute respiratory failure ([facility]) 12/25/2022    Lung mass 10/12/2022    Metastasis to brain ([facility]) 01/14/2024    Fall 01/14/2024    History of colon polyps 05/18/2023    Malignant melanoma of right upper extremity including shoulder ([facility]) 06/22/2021    Hemodialysis access site with mature fistula ([facility]) 07/07/2019    Urinary tract infection due to extended-spectrum beta lactamase (ESBL) producing Escherichia coli 07/03/2019    Chronic kidney disease (CKD), stage IV (severe) ([facility]) 11/16/2016    Right inguinal hernia 11/16/2016

## 2024-01-18 NOTE — PROGRESS NOTES
Inpatient Progress Note 1/18/2024        Christiano Miller  1945  [id number]      Assessment/Plan:    Encephalopathy  Multiple brain metastases (some hemorrhagic) secondary to metastatic malignant melanoma, S/P brain resection frontal obe lesion 1/15/2024   Post op pneumocephalus  History of malignant melanoma (prior wide excision back lesion 6/21, chemotherapy, pulmonary metastases established 11/22 via bronchoscopy (EBUS)  ESRD  Question of colon mass  History of prostatic hypertrophy bladder outlet obstruction  History of ESBL E. coli urinary tract infection, 9/23  Protein calorie malnutrition, cachexia      Systemic steroid, cranial XRT  Radiation therapy to brain  Dialytic support  Empiric Keppra per neurosurgical service  Ulcer, DVT prophylaxis  Completion ATB for E Coli cystitis    D/C CVC    Very poor prognosis, discussed and reviewed with oncology service    Complex decisions required for evaluation management reviewed during critical care rounds. Critical care signing off.    SHEREE Trujillo  [phone]    Subjective:    No acute overnight issues reported per discussion with nursing staff.  The patient ios more responsive, interactive although remains mildly confused.      Review of Systems     Unable to obtain in light of current clinical circumstances    Physical Exam:           BP (!) 150/52   Pulse 61   Temp 98 °F (36.7 °C) (Oral)   Resp 12   Ht 1.727 m (5' 8\")   Wt 54.1 kg (119 lb 4.3 oz)   SpO2 99%   BMI 18.13 kg/m²       General: Chronically ill-appearing male, arouses, no distress vitals reviewed  Eyes: Pupils and motor are intact  ENT: Head normal.  Hearing impaired.  Moist oral mucosa.  Neck supple, R IJ CVC  Cardiovascular: Regular rate.  Respiratory: Clear to auscultation  Gastrointestinal: Soft, non tender  Genitourinary: no suprapubic tenderness  Musculoskeletal: Left upper extremity AV fistula.  Skin: warm, dry, absence of rash.  Neuro: Arousable, follows some simple commands,  Differential    Collection Time: 01/18/24  5:05 AM   Result Value Ref Range    WBC 9.8 4.0 - 10.5 K/CU MM    RBC 3.18 (L) 4.6 - 6.2 M/CU MM    Hemoglobin 9.7 (L) 13.5 - 18.0 GM/DL    Hematocrit 29.9 (L) 42 - 52 %    MCV 94.0 78 - 100 FL    MCH 30.5 27 - 31 PG    MCHC 32.4 32.0 - 36.0 %    RDW 13.2 11.7 - 14.9 %    Platelets 186 140 - 440 K/CU MM    MPV 9.5 7.5 - 11.1 FL    Differential Type AUTOMATED DIFFERENTIAL     Segs Relative 84.6 (H) 36 - 66 %    Lymphocytes % 8.3 (L) 24 - 44 %    Monocytes % 6.5 (H) 0 - 4 %    Eosinophils % 0.0 0 - 3 %    Basophils % 0.0 0 - 1 %    Segs Absolute 8.3 K/CU MM    Lymphocytes Absolute 0.8 K/CU MM    Monocytes Absolute 0.6 K/CU MM    Eosinophils Absolute 0.0 K/CU MM    Basophils Absolute 0.0 K/CU MM    Nucleated RBC % 0.0 %    Total Nucleated RBC 0.0 K/CU MM    Total Immature Neutrophil 0.06 K/CU MM    Immature Neutrophil % 0.6 (H) 0 - 0.43 %   Calcium, Ionized    Collection Time: 01/18/24  5:05 AM   Result Value Ref Range    Ionized Ca 1.16 1.12 - 1.32 mMOL/L    Calcium, Ionized 4.64 4.48 - 5.28 MG/DL   Critical Care Panel    Collection Time: 01/18/24  5:05 AM   Result Value Ref Range    Sodium 141 135 - 145 MMOL/L    Potassium 4.5 3.5 - 5.1 MMOL/L    Chloride 101 99 - 110 mMol/L    CO2 26 21 - 32 MMOL/L    Anion Gap 14 7 - 16    Glucose 118 (H) 70 - 99 MG/DL    BUN 48 (H) 6 - 23 MG/DL    Creatinine 5.3 (H) 0.9 - 1.3 MG/DL    Est, Glom Filt Rate 11 (L) >60 mL/min/1.73m2    Calcium 7.6 (L) 8.3 - 10.6 MG/DL    Phosphorus 5.1 (H) 2.5 - 4.9 MG/DL    Magnesium 2.2 1.8 - 2.4 mg/dl   POCT Glucose    Collection Time: 01/18/24  7:42 AM   Result Value Ref Range    POC Glucose 112 (H) 70 - 99 MG/DL       Electronically signed by Keanu Trujillo Jr, DO on 1/18/2024 at 9:08 AM

## 2024-01-18 NOTE — PLAN OF CARE
Problem: Discharge Planning  Goal: Discharge to home or other facility with appropriate resources  Outcome: Progressing     Problem: Pain  Goal: Verbalizes/displays adequate comfort level or baseline comfort level  1/17/2024 2129 by Suyapa Zayas RN  Outcome: Progressing  1/17/2024 1048 by Gabriel Conklin RN  Outcome: Progressing     Problem: Safety - Adult  Goal: Free from fall injury  1/17/2024 2129 by Suyapa Zayas RN  Outcome: Progressing  1/17/2024 1048 by Gabriel Conklin RN  Outcome: Progressing     Problem: Nutrition Deficit:  Goal: Optimize nutritional status  1/17/2024 2129 by Suyapa Zayas RN  Outcome: Progressing  1/17/2024 1048 by Gabriel Conklin RN  Outcome: Progressing     Problem: Skin/Tissue Integrity  Goal: Absence of new skin breakdown  Description: 1.  Monitor for areas of redness and/or skin breakdown  2.  Assess vascular access sites hourly  3.  Every 4-6 hours minimum:  Change oxygen saturation probe site  4.  Every 4-6 hours:  If on nasal continuous positive airway pressure, respiratory therapy assess nares and determine need for appliance change or resting period.  Outcome: Progressing     Problem: Chronic Conditions and Co-morbidities  Goal: Patient's chronic conditions and co-morbidity symptoms are monitored and maintained or improved  Outcome: Progressing     Problem: ABCDS Injury Assessment  Goal: Absence of physical injury  Outcome: Progressing

## 2024-01-18 NOTE — PROGRESS NOTES
Neurosurgery Progress Note  1/18/2024 11:38 AM    Right frontal craniotomy for resection of metastatic melanoma  POD: 2    Assessment and Plan:   S/P craniotomy, resection of tumor- tumor sent for pathology, continue keppra, decadron decreased to 4mg every 8 hours. 3% hypertonic discontinued, allow Na to trend to normal. Patient with improved speech today. He can leave ICU and begin dispo planning. Keep systolic BP<160. Keep HOB>30 degrees. Can work with therapy and have activity as tolerated. Prophylactic heparin added.  Metastatic melanoma- oncology and rad onc. Are following. We recommend gamma knife radiation vs whole brain as well as restarting the patient on systemic treatment of his disease if he is agreeable. Would want radiation performed as soon as possible as regrowth of tumor is high. This was conveyed to Dr. Schmidt.   ESRD- on dialysis, has been on dialysis for 7-8 years per family  ESBL positive e. Coli- present in urine. On meropenum.    Supervising physician: Saray Neely MD.  Dr. Neely was readily and continuously available by phone for direct consultation regarding the care of this patient.    Dragon dictation may have been used in the writing of this note. Spelling or word errors may have occurred. Please call for clarification if there are concerns.      Subjective:   Admit Date: 1/14/2024  PCP: Musa Corral MD    Patient sitting up in bed, alert, asks for his glasses to be cleaned as they are dirty. He is alert and oriented x4, has no complaints this am. Has no changes in vision, speech has improved. He is on a thickened liquid diet.     Data:   Scheduled Meds:   dexAMETHasone  4 mg IntraVENous Q8H    heparin (porcine)  5,000 Units SubCUTAneous 3 times per day    pantoprazole  40 mg Oral QAM AC    meropenem  500 mg IntraVENous Q24H    sennosides-docusate sodium  1 tablet Oral BID    levETIRAcetam  500 mg IntraVENous Q12H    sodium chloride flush  5-40 mL IntraVENous 2 times per day     lower extremity 5/5 throughout, left lower extremity 4/5 throughout  Incision: intact with dermabond, no swelling, no erythema, no drainage noted  Drains: none      Electronically signed by Venus Mcdowell PA-C on 1/18/2024 at 11:38 AM

## 2024-01-18 NOTE — PROGRESS NOTES
V2.0    [facility] Progress Note      Name:  Christiano Miller /Age/Sex: 1945  (78 y.o. male)   MRN & CSN:  [id number] & [id number] Encounter Date/Time: 2024 1:07 PM EST   Location:  -A PCP: Musa Corral MD     Attending:Miah Mcclain,*       Hospital Day: 5    Assessment and Recommendations   Christiano Miller is a 78 y.o. male with pmh of CKD, HTN, and melanoma who presents with Metastasis to brain (HCC) s/p right frontal lobe tumor resection 1/15      Plan:   Malignant melanoma: MRI brain  showed 5 intracranial metastatic lesions the largest was hemorrhagic in nature located within the right frontal lobe measuring 3.6 x 3.5 cm s/p resection of tumor per neurosurgery 1/15/2024.  Neurosurgery recommending gamma knife radiation versus whole brain as well as possible systemic treatment if patient agreeable.  Dr. Schmidt  following and pt has refused options including radiation and BRAF inhibitor in the clinic.  Continue Decadron, Keppra. Goal SBP <160. Prophylactic Heparin restarted  per NS  ESBL UTI: Urine culture  with E. coli ESBL.  Continue meropenem with plan for 1 week course and has history of ESBL.  End-stage renal disease on dialysis: Continue dialysis per nephrology. Received HD   Leukocytosis: persistent in the setting of steroids. On abx as above. Will monitor.   Chronic Anemia: hgb down some to 9.5, but overall stable without evidence of bleeding. Suspected related to acute illness, medications and recent surgery. Will monitor.       Diet ADULT DIET; Dysphagia - Minced and Moist; Moderately Thick (Honey)   DVT Prophylaxis [] Lovenox, [x]  Heparin, [] SCDs, [] Ambulation,  [] Eliquis, [] Xarelto  [] Coumadin   Code Status Full Code   Disposition From: home  Expected Disposition: ARU  Estimated Date of Discharge: medically ready for DC  Patient requires continued admission due to placement   Surrogate Decision Maker/ POA  Son updated at bedside

## 2024-01-18 NOTE — PROGRESS NOTES
discussed but he is on dialysis.  I will inform RT onc.  1/18/2024 WBC 9.8, Hg 9.7, plat 186. Creat 5.3.    ESRD: on HD, nephrology following    Leukocytosis most probably steroid induced, reactive from surgery, UTI    UTI d/t ESBL E-Coli: on HD dosed meropenem    Continue other medical care    We will continue to follow the patient.    Thank you for allowing us to participate in the care of this patient.     Orlando Schmidt

## 2024-01-19 ENCOUNTER — HOSPITAL ENCOUNTER (INPATIENT)
Age: 79
LOS: 1 days | End: 2024-01-20
Attending: PHYSICAL MEDICINE & REHABILITATION | Admitting: PHYSICAL MEDICINE & REHABILITATION
Payer: MEDICARE

## 2024-01-19 ENCOUNTER — APPOINTMENT (OUTPATIENT)
Dept: GENERAL RADIOLOGY | Age: 79
End: 2024-01-19
Attending: PHYSICAL MEDICINE & REHABILITATION
Payer: MEDICARE

## 2024-01-19 VITALS
OXYGEN SATURATION: 98 % | SYSTOLIC BLOOD PRESSURE: 143 MMHG | BODY MASS INDEX: 18.08 KG/M2 | HEART RATE: 74 BPM | DIASTOLIC BLOOD PRESSURE: 82 MMHG | RESPIRATION RATE: 19 BRPM | WEIGHT: 119.27 LBS | HEIGHT: 68 IN | TEMPERATURE: 97 F

## 2024-01-19 PROBLEM — C71.9 BRAIN CANCER (HCC): Status: ACTIVE | Noted: 2024-01-19

## 2024-01-19 LAB
ANION GAP SERPL CALCULATED.3IONS-SCNC: 17 MMOL/L (ref 7–16)
BASOPHILS ABSOLUTE: 0 K/CU MM
BASOPHILS RELATIVE PERCENT: 0.1 % (ref 0–1)
BUN SERPL-MCNC: 79 MG/DL (ref 6–23)
CALCIUM IONIZED: 4.44 MG/DL (ref 4.48–5.28)
CALCIUM SERPL-MCNC: 8.5 MG/DL (ref 8.3–10.6)
CHLORIDE BLD-SCNC: 99 MMOL/L (ref 99–110)
CO2: 21 MMOL/L (ref 21–32)
CREAT SERPL-MCNC: 7.1 MG/DL (ref 0.9–1.3)
CULTURE: NORMAL
CULTURE: NORMAL
DIFFERENTIAL TYPE: ABNORMAL
EOSINOPHILS ABSOLUTE: 0 K/CU MM
EOSINOPHILS RELATIVE PERCENT: 0 % (ref 0–3)
GFR SERPL CREATININE-BSD FRML MDRD: 7 ML/MIN/1.73M2
GLUCOSE BLD-MCNC: 153 MG/DL (ref 70–99)
GLUCOSE BLD-MCNC: 166 MG/DL (ref 70–99)
GLUCOSE SERPL-MCNC: 156 MG/DL (ref 70–99)
HCT VFR BLD CALC: 30.6 % (ref 42–52)
HEMOGLOBIN: 10.2 GM/DL (ref 13.5–18)
IMMATURE NEUTROPHIL %: 0.8 % (ref 0–0.43)
IONIZED CA: 1.11 MMOL/L (ref 1.12–1.32)
LYMPHOCYTES ABSOLUTE: 0.6 K/CU MM
LYMPHOCYTES RELATIVE PERCENT: 5.9 % (ref 24–44)
Lab: NORMAL
Lab: NORMAL
MAGNESIUM: 2.3 MG/DL (ref 1.8–2.4)
MCH RBC QN AUTO: 30.4 PG (ref 27–31)
MCHC RBC AUTO-ENTMCNC: 33.3 % (ref 32–36)
MCV RBC AUTO: 91.1 FL (ref 78–100)
MONOCYTES ABSOLUTE: 0.5 K/CU MM
MONOCYTES RELATIVE PERCENT: 4.9 % (ref 0–4)
NUCLEATED RBC %: 0 %
PDW BLD-RTO: 13 % (ref 11.7–14.9)
PHOSPHORUS: 5.5 MG/DL (ref 2.5–4.9)
PLATELET # BLD: 169 K/CU MM (ref 140–440)
PMV BLD AUTO: 9.7 FL (ref 7.5–11.1)
POTASSIUM SERPL-SCNC: 4.7 MMOL/L (ref 3.5–5.1)
RBC # BLD: 3.36 M/CU MM (ref 4.6–6.2)
SEGMENTED NEUTROPHILS ABSOLUTE COUNT: 9.2 K/CU MM
SEGMENTED NEUTROPHILS RELATIVE PERCENT: 88.3 % (ref 36–66)
SODIUM BLD-SCNC: 137 MMOL/L (ref 135–145)
SPECIMEN: NORMAL
SPECIMEN: NORMAL
TOTAL IMMATURE NEUTOROPHIL: 0.08 K/CU MM
TOTAL NUCLEATED RBC: 0 K/CU MM
WBC # BLD: 10.4 K/CU MM (ref 4–10.5)

## 2024-01-19 PROCEDURE — 99024 POSTOP FOLLOW-UP VISIT: CPT | Performed by: PHYSICIAN ASSISTANT

## 2024-01-19 PROCEDURE — 82330 ASSAY OF CALCIUM: CPT

## 2024-01-19 PROCEDURE — 6370000000 HC RX 637 (ALT 250 FOR IP): Performed by: PHYSICIAN ASSISTANT

## 2024-01-19 PROCEDURE — 6370000000 HC RX 637 (ALT 250 FOR IP): Performed by: PHYSICAL MEDICINE & REHABILITATION

## 2024-01-19 PROCEDURE — 6360000002 HC RX W HCPCS: Performed by: PHYSICIAN ASSISTANT

## 2024-01-19 PROCEDURE — 84100 ASSAY OF PHOSPHORUS: CPT

## 2024-01-19 PROCEDURE — 99223 1ST HOSP IP/OBS HIGH 75: CPT | Performed by: PHYSICAL MEDICINE & REHABILITATION

## 2024-01-19 PROCEDURE — 6360000002 HC RX W HCPCS: Performed by: STUDENT IN AN ORGANIZED HEALTH CARE EDUCATION/TRAINING PROGRAM

## 2024-01-19 PROCEDURE — 74018 RADEX ABDOMEN 1 VIEW: CPT

## 2024-01-19 PROCEDURE — 83735 ASSAY OF MAGNESIUM: CPT

## 2024-01-19 PROCEDURE — 85025 COMPLETE CBC W/AUTO DIFF WBC: CPT

## 2024-01-19 PROCEDURE — 82962 GLUCOSE BLOOD TEST: CPT

## 2024-01-19 PROCEDURE — 2580000003 HC RX 258: Performed by: STUDENT IN AN ORGANIZED HEALTH CARE EDUCATION/TRAINING PROGRAM

## 2024-01-19 PROCEDURE — 6370000000 HC RX 637 (ALT 250 FOR IP): Performed by: STUDENT IN AN ORGANIZED HEALTH CARE EDUCATION/TRAINING PROGRAM

## 2024-01-19 PROCEDURE — 1280000000 HC REHAB R&B

## 2024-01-19 PROCEDURE — 71045 X-RAY EXAM CHEST 1 VIEW: CPT

## 2024-01-19 PROCEDURE — 80048 BASIC METABOLIC PNL TOTAL CA: CPT

## 2024-01-19 PROCEDURE — 99232 SBSQ HOSP IP/OBS MODERATE 35: CPT | Performed by: INTERNAL MEDICINE

## 2024-01-19 PROCEDURE — 36415 COLL VENOUS BLD VENIPUNCTURE: CPT

## 2024-01-19 RX ORDER — SODIUM CHLORIDE 0.9 % (FLUSH) 0.9 %
5-40 SYRINGE (ML) INJECTION PRN
Status: CANCELLED | OUTPATIENT
Start: 2024-01-19

## 2024-01-19 RX ORDER — SENNA AND DOCUSATE SODIUM 50; 8.6 MG/1; MG/1
1 TABLET, FILM COATED ORAL 2 TIMES DAILY
Status: CANCELLED | OUTPATIENT
Start: 2024-01-19

## 2024-01-19 RX ORDER — SODIUM CHLORIDE 9 MG/ML
INJECTION, SOLUTION INTRAVENOUS PRN
Status: CANCELLED | OUTPATIENT
Start: 2024-01-19

## 2024-01-19 RX ORDER — ACETAMINOPHEN 325 MG/1
650 TABLET ORAL EVERY 4 HOURS PRN
Status: DISCONTINUED | OUTPATIENT
Start: 2024-01-19 | End: 2024-01-20 | Stop reason: HOSPADM

## 2024-01-19 RX ORDER — DEXAMETHASONE SODIUM PHOSPHATE 4 MG/ML
4 INJECTION, SOLUTION INTRA-ARTICULAR; INTRALESIONAL; INTRAMUSCULAR; INTRAVENOUS; SOFT TISSUE EVERY 8 HOURS
Status: DISCONTINUED | OUTPATIENT
Start: 2024-01-19 | End: 2024-01-20 | Stop reason: HOSPADM

## 2024-01-19 RX ORDER — POLYETHYLENE GLYCOL 3350 17 G/17G
17 POWDER, FOR SOLUTION ORAL DAILY
Status: DISCONTINUED | OUTPATIENT
Start: 2024-01-19 | End: 2024-01-20 | Stop reason: HOSPADM

## 2024-01-19 RX ORDER — SEVELAMER CARBONATE 800 MG/1
800 TABLET, FILM COATED ORAL
Status: DISCONTINUED | OUTPATIENT
Start: 2024-01-19 | End: 2024-01-20 | Stop reason: HOSPADM

## 2024-01-19 RX ORDER — SODIUM CHLORIDE 0.9 % (FLUSH) 0.9 %
5-40 SYRINGE (ML) INJECTION PRN
Status: DISCONTINUED | OUTPATIENT
Start: 2024-01-19 | End: 2024-01-20 | Stop reason: HOSPADM

## 2024-01-19 RX ORDER — GLUCAGON 1 MG/ML
1 KIT INJECTION PRN
Status: DISCONTINUED | OUTPATIENT
Start: 2024-01-19 | End: 2024-01-20 | Stop reason: HOSPADM

## 2024-01-19 RX ORDER — SODIUM CHLORIDE 9 MG/ML
INJECTION, SOLUTION INTRAVENOUS PRN
Status: DISCONTINUED | OUTPATIENT
Start: 2024-01-19 | End: 2024-01-20 | Stop reason: HOSPADM

## 2024-01-19 RX ORDER — HEPARIN SODIUM 5000 [USP'U]/ML
5000 INJECTION, SOLUTION INTRAVENOUS; SUBCUTANEOUS EVERY 8 HOURS SCHEDULED
Status: DISCONTINUED | OUTPATIENT
Start: 2024-01-19 | End: 2024-01-20 | Stop reason: HOSPADM

## 2024-01-19 RX ORDER — MEGESTROL ACETATE 20 MG/1
40 TABLET ORAL 2 TIMES DAILY
Status: CANCELLED | OUTPATIENT
Start: 2024-01-19

## 2024-01-19 RX ORDER — DEXTROSE MONOHYDRATE 100 MG/ML
INJECTION, SOLUTION INTRAVENOUS CONTINUOUS PRN
Status: CANCELLED | OUTPATIENT
Start: 2024-01-19

## 2024-01-19 RX ORDER — PANTOPRAZOLE SODIUM 40 MG/1
40 TABLET, DELAYED RELEASE ORAL
Status: CANCELLED | OUTPATIENT
Start: 2024-01-20

## 2024-01-19 RX ORDER — ACETAMINOPHEN 650 MG/1
650 SUPPOSITORY RECTAL EVERY 6 HOURS PRN
Status: CANCELLED | OUTPATIENT
Start: 2024-01-19

## 2024-01-19 RX ORDER — CARVEDILOL 6.25 MG/1
3.12 TABLET ORAL 2 TIMES DAILY
Status: CANCELLED | OUTPATIENT
Start: 2024-01-19

## 2024-01-19 RX ORDER — MEGESTROL ACETATE 20 MG/1
40 TABLET ORAL 2 TIMES DAILY
Status: DISCONTINUED | OUTPATIENT
Start: 2024-01-19 | End: 2024-01-20 | Stop reason: HOSPADM

## 2024-01-19 RX ORDER — CARVEDILOL 6.25 MG/1
3.12 TABLET ORAL 2 TIMES DAILY
Status: DISCONTINUED | OUTPATIENT
Start: 2024-01-19 | End: 2024-01-20 | Stop reason: HOSPADM

## 2024-01-19 RX ORDER — ONDANSETRON 4 MG/1
4 TABLET, ORALLY DISINTEGRATING ORAL EVERY 8 HOURS PRN
Status: DISCONTINUED | OUTPATIENT
Start: 2024-01-19 | End: 2024-01-20 | Stop reason: HOSPADM

## 2024-01-19 RX ORDER — ACETAMINOPHEN 650 MG/1
650 SUPPOSITORY RECTAL EVERY 6 HOURS PRN
Status: DISCONTINUED | OUTPATIENT
Start: 2024-01-19 | End: 2024-01-19

## 2024-01-19 RX ORDER — SODIUM CHLORIDE 0.9 % (FLUSH) 0.9 %
5-40 SYRINGE (ML) INJECTION EVERY 12 HOURS SCHEDULED
Status: CANCELLED | OUTPATIENT
Start: 2024-01-19

## 2024-01-19 RX ORDER — POLYETHYLENE GLYCOL 3350 17 G/17G
17 POWDER, FOR SOLUTION ORAL DAILY PRN
Status: CANCELLED | OUTPATIENT
Start: 2024-01-19

## 2024-01-19 RX ORDER — ACETAMINOPHEN 325 MG/1
650 TABLET ORAL EVERY 6 HOURS PRN
Status: DISCONTINUED | OUTPATIENT
Start: 2024-01-19 | End: 2024-01-20 | Stop reason: HOSPADM

## 2024-01-19 RX ORDER — ACETAMINOPHEN 325 MG/1
650 TABLET ORAL EVERY 6 HOURS PRN
Status: CANCELLED | OUTPATIENT
Start: 2024-01-19

## 2024-01-19 RX ORDER — SENNA AND DOCUSATE SODIUM 50; 8.6 MG/1; MG/1
1 TABLET, FILM COATED ORAL 2 TIMES DAILY
Status: DISCONTINUED | OUTPATIENT
Start: 2024-01-19 | End: 2024-01-20 | Stop reason: HOSPADM

## 2024-01-19 RX ORDER — ONDANSETRON 2 MG/ML
4 INJECTION INTRAMUSCULAR; INTRAVENOUS EVERY 6 HOURS PRN
Status: DISCONTINUED | OUTPATIENT
Start: 2024-01-19 | End: 2024-01-20 | Stop reason: HOSPADM

## 2024-01-19 RX ORDER — INSULIN LISPRO 100 [IU]/ML
0-4 INJECTION, SOLUTION INTRAVENOUS; SUBCUTANEOUS NIGHTLY
Status: DISCONTINUED | OUTPATIENT
Start: 2024-01-19 | End: 2024-01-20 | Stop reason: HOSPADM

## 2024-01-19 RX ORDER — PANTOPRAZOLE SODIUM 40 MG/1
40 TABLET, DELAYED RELEASE ORAL
Status: DISCONTINUED | OUTPATIENT
Start: 2024-01-20 | End: 2024-01-20 | Stop reason: HOSPADM

## 2024-01-19 RX ORDER — INSULIN LISPRO 100 [IU]/ML
0-4 INJECTION, SOLUTION INTRAVENOUS; SUBCUTANEOUS
Status: CANCELLED | OUTPATIENT
Start: 2024-01-19

## 2024-01-19 RX ORDER — SODIUM CHLORIDE 0.9 % (FLUSH) 0.9 %
5-40 SYRINGE (ML) INJECTION EVERY 12 HOURS SCHEDULED
Status: DISCONTINUED | OUTPATIENT
Start: 2024-01-19 | End: 2024-01-20 | Stop reason: HOSPADM

## 2024-01-19 RX ORDER — INSULIN LISPRO 100 [IU]/ML
0-4 INJECTION, SOLUTION INTRAVENOUS; SUBCUTANEOUS NIGHTLY
Status: CANCELLED | OUTPATIENT
Start: 2024-01-19

## 2024-01-19 RX ORDER — IPRATROPIUM BROMIDE AND ALBUTEROL SULFATE 2.5; .5 MG/3ML; MG/3ML
1 SOLUTION RESPIRATORY (INHALATION) EVERY 4 HOURS PRN
Status: CANCELLED | OUTPATIENT
Start: 2024-01-19

## 2024-01-19 RX ORDER — POLYETHYLENE GLYCOL 3350 17 G/17G
17 POWDER, FOR SOLUTION ORAL DAILY PRN
Status: DISCONTINUED | OUTPATIENT
Start: 2024-01-19 | End: 2024-01-20 | Stop reason: HOSPADM

## 2024-01-19 RX ORDER — INSULIN LISPRO 100 [IU]/ML
0-4 INJECTION, SOLUTION INTRAVENOUS; SUBCUTANEOUS
Status: DISCONTINUED | OUTPATIENT
Start: 2024-01-19 | End: 2024-01-20 | Stop reason: HOSPADM

## 2024-01-19 RX ORDER — ISOSORBIDE MONONITRATE 30 MG/1
30 TABLET, EXTENDED RELEASE ORAL DAILY
Status: DISCONTINUED | OUTPATIENT
Start: 2024-01-20 | End: 2024-01-20 | Stop reason: HOSPADM

## 2024-01-19 RX ORDER — BISACODYL 10 MG
10 SUPPOSITORY, RECTAL RECTAL DAILY PRN
Status: DISCONTINUED | OUTPATIENT
Start: 2024-01-19 | End: 2024-01-20 | Stop reason: HOSPADM

## 2024-01-19 RX ORDER — ISOSORBIDE MONONITRATE 30 MG/1
30 TABLET, EXTENDED RELEASE ORAL DAILY
Status: CANCELLED | OUTPATIENT
Start: 2024-01-20

## 2024-01-19 RX ORDER — ONDANSETRON 4 MG/1
4 TABLET, ORALLY DISINTEGRATING ORAL EVERY 8 HOURS PRN
Status: CANCELLED | OUTPATIENT
Start: 2024-01-19

## 2024-01-19 RX ORDER — LEVETIRACETAM 500 MG/5ML
500 INJECTION, SOLUTION, CONCENTRATE INTRAVENOUS EVERY 12 HOURS
Status: DISCONTINUED | OUTPATIENT
Start: 2024-01-19 | End: 2024-01-20 | Stop reason: HOSPADM

## 2024-01-19 RX ORDER — SEVELAMER CARBONATE 800 MG/1
800 TABLET, FILM COATED ORAL
Status: CANCELLED | OUTPATIENT
Start: 2024-01-19

## 2024-01-19 RX ORDER — IPRATROPIUM BROMIDE AND ALBUTEROL SULFATE 2.5; .5 MG/3ML; MG/3ML
1 SOLUTION RESPIRATORY (INHALATION) EVERY 4 HOURS PRN
Status: DISCONTINUED | OUTPATIENT
Start: 2024-01-19 | End: 2024-01-20 | Stop reason: HOSPADM

## 2024-01-19 RX ORDER — GLUCAGON 1 MG/ML
1 KIT INJECTION PRN
Status: CANCELLED | OUTPATIENT
Start: 2024-01-19

## 2024-01-19 RX ORDER — LEVETIRACETAM 500 MG/5ML
500 INJECTION, SOLUTION, CONCENTRATE INTRAVENOUS EVERY 12 HOURS
Status: CANCELLED | OUTPATIENT
Start: 2024-01-19

## 2024-01-19 RX ORDER — ONDANSETRON 2 MG/ML
4 INJECTION INTRAMUSCULAR; INTRAVENOUS EVERY 6 HOURS PRN
Status: CANCELLED | OUTPATIENT
Start: 2024-01-19

## 2024-01-19 RX ORDER — DEXTROSE MONOHYDRATE 100 MG/ML
INJECTION, SOLUTION INTRAVENOUS CONTINUOUS PRN
Status: DISCONTINUED | OUTPATIENT
Start: 2024-01-19 | End: 2024-01-20 | Stop reason: HOSPADM

## 2024-01-19 RX ORDER — HEPARIN SODIUM 5000 [USP'U]/ML
5000 INJECTION, SOLUTION INTRAVENOUS; SUBCUTANEOUS EVERY 8 HOURS SCHEDULED
Status: CANCELLED | OUTPATIENT
Start: 2024-01-19

## 2024-01-19 RX ORDER — DEXAMETHASONE SODIUM PHOSPHATE 4 MG/ML
4 INJECTION, SOLUTION INTRA-ARTICULAR; INTRALESIONAL; INTRAMUSCULAR; INTRAVENOUS; SOFT TISSUE EVERY 8 HOURS
Status: CANCELLED | OUTPATIENT
Start: 2024-01-19

## 2024-01-19 RX ADMIN — HEPARIN SODIUM 5000 UNITS: 5000 INJECTION INTRAVENOUS; SUBCUTANEOUS at 04:57

## 2024-01-19 RX ADMIN — MEGESTROL ACETATE 40 MG: 20 TABLET ORAL at 22:05

## 2024-01-19 RX ADMIN — SODIUM CHLORIDE, PRESERVATIVE FREE 10 ML: 5 INJECTION INTRAVENOUS at 22:07

## 2024-01-19 RX ADMIN — DEXAMETHASONE SODIUM PHOSPHATE 4 MG: 4 INJECTION, SOLUTION INTRAMUSCULAR; INTRAVENOUS at 18:28

## 2024-01-19 RX ADMIN — ONDANSETRON 4 MG: 2 INJECTION INTRAMUSCULAR; INTRAVENOUS at 12:33

## 2024-01-19 RX ADMIN — DEXAMETHASONE SODIUM PHOSPHATE 4 MG: 4 INJECTION, SOLUTION INTRAMUSCULAR; INTRAVENOUS at 00:04

## 2024-01-19 RX ADMIN — POLYETHYLENE GLYCOL (3350) 17 G: 17 POWDER, FOR SOLUTION ORAL at 22:05

## 2024-01-19 RX ADMIN — PANTOPRAZOLE SODIUM 40 MG: 40 TABLET, DELAYED RELEASE ORAL at 05:00

## 2024-01-19 RX ADMIN — SENNOSIDES, DOCUSATE SODIUM 1 TABLET: 8.6; 5 TABLET ORAL at 22:05

## 2024-01-19 RX ADMIN — SEVELAMER CARBONATE 800 MG: 800 TABLET, FILM COATED ORAL at 22:05

## 2024-01-19 RX ADMIN — BISACODYL 10 MG: 10 SUPPOSITORY RECTAL at 22:05

## 2024-01-19 RX ADMIN — CARVEDILOL 3.12 MG: 6.25 TABLET, FILM COATED ORAL at 22:06

## 2024-01-19 RX ADMIN — LEVETIRACETAM 500 MG: 500 INJECTION INTRAVENOUS at 22:05

## 2024-01-19 RX ADMIN — MEROPENEM 500 MG: 500 INJECTION, POWDER, FOR SOLUTION INTRAVENOUS at 18:33

## 2024-01-19 ASSESSMENT — PAIN - FUNCTIONAL ASSESSMENT: PAIN_FUNCTIONAL_ASSESSMENT: PREVENTS OR INTERFERES SOME ACTIVE ACTIVITIES AND ADLS

## 2024-01-19 ASSESSMENT — PAIN DESCRIPTION - ORIENTATION: ORIENTATION: RIGHT;LEFT;LOWER

## 2024-01-19 ASSESSMENT — PAIN SCALES - GENERAL
PAINLEVEL_OUTOF10: 2
PAINLEVEL_OUTOF10: 4

## 2024-01-19 ASSESSMENT — PAIN DESCRIPTION - ONSET: ONSET: ON-GOING

## 2024-01-19 ASSESSMENT — PAIN DESCRIPTION - LOCATION: LOCATION: ABDOMEN

## 2024-01-19 ASSESSMENT — PAIN DESCRIPTION - DESCRIPTORS: DESCRIPTORS: ACHING;CRAMPING;DISCOMFORT

## 2024-01-19 ASSESSMENT — PAIN DESCRIPTION - PAIN TYPE: TYPE: ACUTE PAIN

## 2024-01-19 ASSESSMENT — PAIN DESCRIPTION - FREQUENCY: FREQUENCY: CONTINUOUS

## 2024-01-19 NOTE — PROGRESS NOTES
Nephrology Progress Note        [address], [address]  [place] [address]  Phone: [phone]  Office Hours: 8:30AM - 4:30PM  Monday - Friday 1/19/2024 6:44 AM  Subjective:   Admit Date: 1/14/2024  PCP: Musa Corral MD  Interval History:   ON ROOM AIR  BP at goal    Diet: ADULT DIET; Dysphagia - Minced and Moist; Moderately Thick (Honey)  ADULT ORAL NUTRITION SUPPLEMENT; Lunch, Dinner; Frozen Oral Supplement      Data:   Scheduled Meds:   dexAMETHasone  4 mg IntraVENous Q8H    heparin (porcine)  5,000 Units SubCUTAneous 3 times per day    pantoprazole  40 mg Oral QAM AC    meropenem  500 mg IntraVENous Q24H    sennosides-docusate sodium  1 tablet Oral BID    levETIRAcetam  500 mg IntraVENous Q12H    sodium chloride flush  5-40 mL IntraVENous 2 times per day    sodium chloride flush  5-40 mL IntraVENous 2 times per day    carvedilol  3.125 mg Oral BID    isosorbide mononitrate  30 mg Oral Daily    megestrol  40 mg Oral BID    sevelamer  800 mg Oral TID WC    insulin lispro  0-4 Units SubCUTAneous TID WC    insulin lispro  0-4 Units SubCUTAneous Nightly     Continuous Infusions:   sodium chloride      sodium chloride      dextrose       PRN Meds:ipratropium 0.5 mg-albuterol 2.5 mg, sodium chloride flush, sodium chloride, ondansetron **OR** ondansetron, polyethylene glycol, acetaminophen **OR** acetaminophen, sodium chloride flush, sodium chloride, hydrALAZINE, glucose, dextrose bolus **OR** dextrose bolus, glucagon (rDNA), dextrose  I/O last 3 completed shifts:  In: 1279.7 [P.O.:660; I.V.:20; IV Piggyback:99.7]  Out: 3150 [Urine:650]  I/O this shift:  In: -   Out: 350 [Urine:350]    Intake/Output Summary (Last 24 hours) at 1/19/2024 0644  Last data filed at 1/19/2024 0450  Gross per 24 hour   Intake 440 ml   Output 575 ml   Net -135 ml       CBC:   Recent Labs     01/17/24  0445 01/18/24  0505 01/19/24  0430   WBC 13.7* 9.8 10.4   HGB 9.5* 9.7* 10.2*    186 169       BMP:    Recent

## 2024-01-19 NOTE — PROCEDURES
Central Venous Catheter Insertion Procedure Note   Procedure: Insertion of Central Venous Catheter   Procedure Clinician: Vidhya Hummel MD   Procedure Assistant: None   Indications: sclerosing agents   Size and location: 7fr TLC, RIJ   Attempts: 1   Procedure Details:   Informed consent was obtained for the procedure, including sedation. Risks of lung perforation, hemorrhage, arrhythmia, and adverse drug reaction were discussed.     Under sterile conditions the skin above the RIJ was prepped with chlorhexidine and covered with a sterile drape. Local anesthesia was applied to the skin and subcutaneous tissues. Ultrasound was used to localize the vein and an 18-gauge needle was then inserted into the vein. A drop test was performed and was negative for any evidence of arterial flow. A guide wire was then passed easily through the catheter. There were no arrhythmias. Ultrasound was used to identify the guidewire once in the vein. The catheter was then withdrawn. A 7Fr TLC was then inserted into the vessel over the guide wire. The guidewire was then removed with the tip intact, and witnessed by bedside nurse. The catheter was sutured into place.     Findings:   There were no changes to vital signs. Catheter was flushed with 20 mL NS. Patient tolerated the procedure well.     Lung ultrasound:   Pleural sliding - yes  Lung point - no  A lines - yes  B lines - no   Consolidation? Not assessed   Pleural effusion? Not assessed     Recommendations:   CXR ordered to verify placement.   
1/19/2024 at 1:33 PM

## 2024-01-19 NOTE — PROGRESS NOTES
4 Eyes Skin Assessment     NAME:  Christiano Miller  YOB: 1945  MEDICAL RECORD NUMBER:  [id number]    The patient is being assessed for  Admission    I agree that at least one RN has performed a thorough Head to Toe Skin Assessment on the patient. ALL assessment sites listed below have been assessed.      Areas assessed by both nurses:    Head, Face, Ears, Shoulders, Back, Chest, Arms, Elbows, Hands, Sacrum. Buttock, Coccyx, Ischium, and Legs. Feet and Heels        Does the Patient have a Wound? No noted wound(s) surgical incision to forehead       Jose Prevention initiated by RN: No  Wound Care Orders initiated by RN: No    Pressure Injury (Stage 3,4, Unstageable, DTI, NWPT, and Complex wounds) if present, place Wound referral order by RN under : No    New Ostomies, if present place, Ostomy referral order under : No     Nurse 1 eSignature: Electronically signed by Shawna Curtis LPN on 1/19/24 at 6:44 PM EST    **SHARE this note so that the co-signing nurse can place an eSignature**    Nurse 2 eSignature: Electronically signed by CAMERON JEAN BAPTISTE RN on 1/19/24 at 6:46 PM EST

## 2024-01-19 NOTE — DISCHARGE SUMMARY
V2.0  Discharge Summary    Name:  Christiano Miller /Age/Sex: 1945 (78 y.o. male)   Admit Date: 2024  Discharge Date: 24    MRN & CSN:  [id number] & [id number] Encounter Date and Time 24 12:36 PM EST    Attending:  Miah Mcclain,* Discharging Provider: Miah Mcclain MD       Hospital Course:     Brief HPI: Christiano Miller is a 78 y.o. male  with past medical history significant for ESRD on HD MWF, prostatomegaly with elevated PSA with LUTS, recent history of ESBL positive E. coli UTI in , primary malignant melanoma with metastasis to lung, sigmoid colon, severe diverticulosis who presented to [facility] ER after a fall.  Patient called for help, no loss of consciousness, noted mild dysarthria, no motor or sensory changes, no vision changes, reports mild headache, no fever, chills, urinary frequency/urgency, burning micturition. CT head without contrast showed multiple hypodense lesions, most likely hemorrhagic brain metastasis, consulted neurosurgery, given one-time of 10 mg IV Decadron followed by 6 mg IV every 6 hours, Keppra 500 mg twice daily for seizure prophylaxis, repeat CT head in 6 hours to ensure the stability of hemorrhage, MRI brain with and without contrast to further evaluate lesions.    Brief Problem Based Course:     Malignant melanoma: MRI brain  showed 5 intracranial metastatic lesions the largest was hemorrhagic in nature located within the right frontal lobe measuring 3.6 x 3.5 cm s/p resection of tumor per neurosurgery 1/15/2024.  Neurosurgery recommending gamma knife radiation versus whole brain as well as possible systemic treatment if patient agreeable.  Dr. Schmidt  following and pt has refused options including radiation and BRAF inhibitor in the clinic.  Continue Decadron, Keppra. Patient to be continued to be seen in ARU by neurosurgery and oncology.    ESBL UTI: Urine culture  with E. coli ESBL.  Continue meropenem with plan for 1 week

## 2024-01-19 NOTE — PLAN OF CARE
of_______________________________.    [] Independent   [] Mod I  [] Supervision  [x] CGA   [] Min A   [] Mod A  Level for ambulation []without assistive device  [x] with assistive device        [] Independent   [] Mod I  [] Supervision [x] CGA   [] Min A   [] Mod A  Level for transfers []without assistive device  [x] with assistive device         [] Independent   [] Mod I  [] Supervision [x] CGA   [] Min A   [] Mod A Level with ADL's []without assistive device   [x] with assistive device     ___________________     Level with cognitive skills requiring [] No assist [] Supervision  [x] Active Assist/Cues     [] Maximize level of mobility and ADL's to decrease burden on caregiver    IPOC brief synthesis of Preadmission Screen, Post-Admission Evaluation, and Therapy Evaluations: Acute inpatient rehabilitation with occupational and physical therapy 180 minutes 5 out of every 7 days. Will address basic and  advancing mobility with self-care instruction and adaptive equipment training. Caregiver education will be offered. Expected length of stay  prior to a supervised level of function for discharge home with a walker and [facility] OT/PT is 2 weeks.     Additional recommendation:     Metastatic malignant melanoma with brain involvement: The patient requires daily occupational and physical therapy with speech-language pathology.  With his generalized weakness, poor coordination and history of falling, he is at risk for falling during standing ADLs and mobility activities.  Therefore, we must provide him adaptive equipment training with strengthening exercises, balance recovery training and conditioning development.  We are continuing his diet modified for thickness of liquids and textures of solids.  He requires aggressive pulmonary hygiene measures, cautious pain management and monitoring of his craniotomy incision for signs of infection.  Seizure precautions will be followed.  He needs DVT prophylaxis.  Caregiver training will  team.    ________________________________________________   ______________________  Patient/Significant Other      Date    I have reviewed this initial plan of care and agree with its contents:    Title   Name    Date    Time    Physician: STEPHANIE Ha MD 1/20/2024 10:20 AM     Case Mgmt:    OT:     PT:    RN: Savannah Che RN    ST:    Dietician:     ADMIT DATE:1/19/2024

## 2024-01-19 NOTE — PROGRESS NOTES
ONCOLOGY HEMATOLOGY  PROGRESS NOTE    2024  8:13 AM    Patient:    Christiano Miller  : 1945   78 y.o.             MRN: [id number]  Admitted: 2024  6:36 AM ATT: Miah Mcclain,*   -A  AdmitDx: Intracranial mass [R90.0]  Coccyx pain [M53.3]  Confusion [R41.0]  Metastasis to brain (HCC) [C79.31]  Fall, initial encounter [W19.XXXA]  PCP: Musa Corral MD    HISTORY OF PRESENT ILLNESS   Christiano Miller is a 78 y.o. male who was admitted after attaining a fall, unsteady gait and also dysarthria.  Denies any syncopal episode.  Denies any vision changes.  Reported dizziness.  No other focal weakness.  CT scan of the head on admission with multiple hypodense lesions mostly hemorrhagic brain metastasis.  This was followed by MRI of the brain which revealed 5 intracranial metastasis, largest of which is hemorrhagic in the right frontal lobe.  With surrounding edema.  Got a loading dose of steroids followed by maintenance steroids.  Has been evaluated by neurosurgery.      BACKGROUND ONCOLOGY HISTORY:  Patient known to us with diagnosis of melanoma, stage IIa in May 2021.  He had wide excision with sentinel lymph node biopsy.  He was under surveillance until 2022 when he was diagnosed with metastatic melanoma.  BRAF could not be performed secondary to insufficient tissue.  He started immunotherapy  and looks like he received until 2023.  He did have response as per PET scan in May 2023.  He then stopped Keytruda secondary to adverse effects.  Refused radiation therapy or BRAF inhibitor.  Repeat CT scan of the chest with interval increase in the size of the medial right upper lobe lung nodule.  In addition there were new large right hilar enlarged lymph nodes.    : CBC with wbc 14, hb 11, plt 261  I discussed with him about RT to  the brain.   Seen by nephrologist for kidney disease.  1/15/2024 CT chest:   1. Disease progression.  A right upper lobe  to consider radiation at that time.   Latha discussed with children who were agreeable to RT to the brain and not to pursue systemic therapy.  Hospice was also discussed but he is on dialysis.  I will inform RT onc.  1/18/2024 WBC 9.8, Hg 9.7, plat 186. Creat 5.3.  D/w RT onc who will start RT in 2 weeks due to concern of wound dehiscence.    1/19/2024 Creat 7.1. WBC 10.4, HG 10.2, plat 169.   ESRD: on HD, nephrology following    Leukocytosis most probably steroid induced, reactive from surgery, UTI    UTI d/t ESBL E-Coli: on HD dosed meropenem    Continue other medical care    We will continue to follow the patient.    Thank you for allowing us to participate in the care of this patient.     Orlando Schmidt

## 2024-01-19 NOTE — PROGRESS NOTES
ARU Admission Report    [x] Approved in Relay (Time [id number] )  [x] Printed 2 copies of PAS  [x] Bed has been reserved  [x] Dr Ha has been Notified of Patient Admission   [x] Signed and Held Orders are in chart  [x] Hospitalist has completed discharge order    Room transferring from [facility]: -A         Room assigned on ARU:  1011  Report received from : ***  2024      12:58 PM    NAME: Christiano Miller   : 1945  AGE: 78 y.o.  Code Status:   Advance Directives       Power of  Living Will ACP-Advance Directive ACP-Power of     Not on File Not on File Not on File Not on File          Full Code    Dx: Intracranial mass [R90.0]  Coccyx pain [M53.3]  Confusion [R41.0]  Metastasis to brain (HCC) [C79.31]  Fall, initial encounter [W19.XXXA]  ICD Code: C79.31 Isolation Status: Currently active isolation(s): Contact  Allergies: No Known Allergies  Medical Hx:   Past Medical History:   Diagnosis Date    Abnormal urine     abn urine culture 4/3/2017- OR for 2017 cancelled-rescheduled for 2017- ( on 2017 pt states finishes antibiotic today and had repeat urine culture at office last week)    Cancer (HCC)     melanoma    Chronic kidney disease, stage 4, severely decreased GFR (HCC) 2016    Dialysis on Mon-Wed-Fri    follows with Dr Corral    Diarrhea     Nunez catheter in place     Has had in place since 16    Hearing deficit     both ears --no hearing aids    Hemodialysis patient (HCC)     Sees Dr Corral    Dialysis on Mon-Wed-Fri    History of blood transfusion     Hypertension     Follows with Dr. Corral    Inguinal hernia     Missing teeth, acquired     upper and lower  some broken off now    Stomach pain     Wears glasses     for reading        Past Surgical History:   Procedure Laterality Date    ABDOMEN SURGERY Right 2021    RIGHT BACK EXCISION 5.8 CM MELANOMA (3.1 MM DEPTH) WITH LOCAL FLAP performed by Jose Schuster MD at [facility] OR    AXILLARY SURGERY  N/A 06/22/2021    SENTINEL LYMPHNODE BIOPSY RIGHT AXILLA performed by Venus Potter MD at [facility] OR    COLONOSCOPY      COLONOSCOPY N/A 5/18/2023    COLONOSCOPY DIAGNOSTIC performed by Augusto Groves MD at [facility] ENDOSCOPY    DIALYSIS FISTULA CREATION Left 11/29/2016    Left upper arm    HERNIA REPAIR Right 11/29/2016    INGUINAL HERNIA    PROSTATE SURGERY  04/2017       Background: Metastatic melanoma   Precautions:          Diet: ADULT DIET; Dysphagia - Minced and Moist; Moderately Thick (Honey)  ADULT ORAL NUTRITION SUPPLEMENT; Lunch, Dinner; Frozen Oral Supplement  Accuchecks: ac/hs  Labs:   Lab Results   Component Value Date    WBC 10.4 01/19/2024    HGB 10.2 (L) 01/19/2024    HCT 30.6 (L) 01/19/2024    MCV 91.1 01/19/2024     01/19/2024     Recent Labs     01/17/24  0445 01/18/24  0505 01/19/24  0430   BUN 80* 48* 79*           Estimated Creatinine Clearance: 7 mL/min (A) (based on SCr of 7.1 mg/dL (H)).  INR: No results for input(s): \"INR\" in the last 72 hours.  No results for input(s): \"BMP\" in the last 72 hours.  LDA:   Peripheral IV 01/18/24 Proximal;Right;Dorsal Forearm (Active)   Site Assessment Clean, dry & intact 01/18/24 1813   Line Status Intermittent infusions;Infusing 01/18/24 1813   Line Care Connections checked and tightened;Ports disinfected 01/18/24 1813   Phlebitis Assessment No symptoms 01/18/24 1813   Infiltration Assessment 0 01/18/24 1813   Alcohol Cap Used Yes 01/18/24 1813   Dressing Status Clean, dry & intact 01/18/24 1813   Dressing Type Transparent 01/18/24 1813   Dressing Intervention New 01/18/24 1651     Mobility:  Bed Mobility assist x2   Ambulatory: Device walker    Assist Level moderate/heavy   Toileting assist x2 to the bathroom; x1 for urinal   Any restrictions per surgeon or wt bearing precautions? Please make sure these are in orders.  Vitals: MEWS Score: 1  Level of Consciousness: Alert (0)   Vitals:    01/19/24 1200 01/19/24 1215 01/19/24 1230 01/19/24 1248

## 2024-01-19 NOTE — PROGRESS NOTES
Dr. Graff sees  the patient  I have requested nurse to page her or on-call physician for her group

## 2024-01-19 NOTE — PROGRESS NOTES
[facility]  DEPARTMENT OF SPEECH/LANGUAGE PATHOLOGY    Christiano Miller  1/19/2024  [id number]    Attempted to see Christiano Miller for dysphagia follow-up. Pt off the floor at dialysis. Will reattempt as schedule allows.    Chata Swain, SLP  1/19/2024  11:07 AM

## 2024-01-19 NOTE — CARE COORDINATION
PS'd Dr. Orlando Schmidt on clarification of when radiation with be starting.  During admission vs outpatient.  Will need this information prior to patient admitting to ARU.     1000:  Per Oncology patient will start radiation in 2 weeks.      Received approval for admission to ARU.  Auth#  [id number].    Will notify MARÍA and MD in IDR.     Patients plan is to take patient home at discharge from ARU.  Per patients daughter patient with have 24/7 supervision once home with help from family and friends.

## 2024-01-19 NOTE — PROGRESS NOTES
Neurosurgery Progress Note  1/19/2024 7:39 AM      Right frontal craniotomy for resection of metastatic melanoma  POD: 3    Assessment and Plan:   S/P craniotomy, resection of tumor- tumor sent for pathology, tissue positive for metastatic melanoma, continue keppra, decadron decreased to 4mg every 8 hours. 3% hypertonic discontinued, allow Na to trend to normal. Patient with improved speech today. He can leave ICU and begin dispo planning. Keep systolic BP<160. Keep HOB>30 degrees. Can work with therapy and have activity as tolerated. Prophylactic heparin added. Patient can be bathed and hair washed, incentive spirometer encouraged. ARU precert pending. Nunez can be removed from our standpoint.   Metastatic melanoma- oncology and rad onc. Are following. We recommend gamma knife radiation vs whole brain as well as restarting the patient on systemic treatment of his disease if he is agreeable. Would want radiation performed as soon as possible as regrowth of tumor is high. This was conveyed to Dr. Schmidt.   ESRD- on dialysis, has been on dialysis for 7-8 years per family  ESBL positive e. Coli- present in urine. On meropenum.    Supervising physician: Saray Neely MD.  Dr. Neely was readily and continuously available by phone for direct consultation regarding the care of this patient.    Dragon dictation may have been used in the writing of this note. Spelling or word errors may have occurred. Please call for clarification if there are concerns.      Subjective:   Admit Date: 1/14/2024  PCP: Musa Corral MD    Patient sitting up in bed. He has no complaints at this time, is doing well. Waiting on aru. Makes frequent jokes, pleasant.    Data:   Scheduled Meds:   dexAMETHasone  4 mg IntraVENous Q8H    heparin (porcine)  5,000 Units SubCUTAneous 3 times per day    pantoprazole  40 mg Oral QAM AC    meropenem  500 mg IntraVENous Q24H    sennosides-docusate sodium  1 tablet Oral BID    levETIRAcetam  500 mg

## 2024-01-20 VITALS
TEMPERATURE: 32 F | WEIGHT: 137.35 LBS | HEIGHT: 68 IN | SYSTOLIC BLOOD PRESSURE: 90 MMHG | DIASTOLIC BLOOD PRESSURE: 50 MMHG | BODY MASS INDEX: 20.82 KG/M2

## 2024-01-20 PROBLEM — R13.12 OROPHARYNGEAL DYSPHAGIA: Status: ACTIVE | Noted: 2024-01-20

## 2024-01-20 PROBLEM — R39.11 BENIGN PROSTATIC HYPERPLASIA WITH URINARY HESITANCY: Status: ACTIVE | Noted: 2024-01-20

## 2024-01-20 PROBLEM — R26.9 GAIT DISTURBANCE: Status: ACTIVE | Noted: 2024-01-20

## 2024-01-20 PROBLEM — N30.00 ACUTE CYSTITIS WITHOUT HEMATURIA: Status: ACTIVE | Noted: 2024-01-20

## 2024-01-20 PROBLEM — K59.00 OBSTIPATION: Status: ACTIVE | Noted: 2024-01-20

## 2024-01-20 PROBLEM — N40.1 BENIGN PROSTATIC HYPERPLASIA WITH URINARY HESITANCY: Status: ACTIVE | Noted: 2024-01-20

## 2024-01-20 PROBLEM — R53.1 GENERALIZED WEAKNESS: Status: ACTIVE | Noted: 2024-01-20

## 2024-01-20 LAB — GLUCOSE BLD-MCNC: 95 MG/DL (ref 70–99)

## 2024-01-20 PROCEDURE — 6360000002 HC RX W HCPCS: Performed by: STUDENT IN AN ORGANIZED HEALTH CARE EDUCATION/TRAINING PROGRAM

## 2024-01-20 PROCEDURE — 82962 GLUCOSE BLOOD TEST: CPT

## 2024-01-20 PROCEDURE — 5A12012 PERFORMANCE OF CARDIAC OUTPUT, SINGLE, MANUAL: ICD-10-PCS | Performed by: STUDENT IN AN ORGANIZED HEALTH CARE EDUCATION/TRAINING PROGRAM

## 2024-01-20 PROCEDURE — 3E033XZ INTRODUCTION OF VASOPRESSOR INTO PERIPHERAL VEIN, PERCUTANEOUS APPROACH: ICD-10-PCS | Performed by: STUDENT IN AN ORGANIZED HEALTH CARE EDUCATION/TRAINING PROGRAM

## 2024-01-20 PROCEDURE — 2580000003 HC RX 258: Performed by: STUDENT IN AN ORGANIZED HEALTH CARE EDUCATION/TRAINING PROGRAM

## 2024-01-20 PROCEDURE — 0BH17EZ INSERTION OF ENDOTRACHEAL AIRWAY INTO TRACHEA, VIA NATURAL OR ARTIFICIAL OPENING: ICD-10-PCS | Performed by: STUDENT IN AN ORGANIZED HEALTH CARE EDUCATION/TRAINING PROGRAM

## 2024-01-20 PROCEDURE — 5A1935Z RESPIRATORY VENTILATION, LESS THAN 24 CONSECUTIVE HOURS: ICD-10-PCS | Performed by: STUDENT IN AN ORGANIZED HEALTH CARE EDUCATION/TRAINING PROGRAM

## 2024-01-20 RX ORDER — 0.9 % SODIUM CHLORIDE 0.9 %
INTRAVENOUS SOLUTION INTRAVENOUS CONTINUOUS PRN
Status: COMPLETED | OUTPATIENT
Start: 2024-01-20 | End: 2024-01-20

## 2024-01-20 RX ORDER — EPINEPHRINE IN SOD CHLOR,ISO 1 MG/10 ML
SYRINGE (ML) INTRAVENOUS
Status: COMPLETED | OUTPATIENT
Start: 2024-01-20 | End: 2024-01-20

## 2024-01-20 RX ADMIN — EPINEPHRINE 1 MG: 0.1 INJECTION INTRACARDIAC; INTRAVENOUS at 03:53

## 2024-01-20 RX ADMIN — EPINEPHRINE 1 MG: 0.1 INJECTION INTRACARDIAC; INTRAVENOUS at 04:02

## 2024-01-20 RX ADMIN — DEXAMETHASONE SODIUM PHOSPHATE 4 MG: 4 INJECTION, SOLUTION INTRAMUSCULAR; INTRAVENOUS at 00:22

## 2024-01-20 RX ADMIN — EPINEPHRINE 1 MG: 0.1 INJECTION INTRACARDIAC; INTRAVENOUS at 03:56

## 2024-01-20 RX ADMIN — EPINEPHRINE 1 MG: 0.1 INJECTION INTRACARDIAC; INTRAVENOUS at 04:00

## 2024-01-20 RX ADMIN — EPINEPHRINE 1 MG: 0.1 INJECTION INTRACARDIAC; INTRAVENOUS at 03:50

## 2024-01-20 RX ADMIN — SODIUM CHLORIDE 1000 ML: 9 INJECTION, SOLUTION INTRAVENOUS at 04:48

## 2024-01-20 RX ADMIN — EPINEPHRINE 1 MG: 0.1 INJECTION INTRACARDIAC; INTRAVENOUS at 04:47

## 2024-01-20 NOTE — PLAN OF CARE
Problem: Discharge Planning  Goal: Discharge to home or other facility with appropriate resources  Outcome: Progressing  Flowsheets (Taken 1/19/2024 2206)  Discharge to home or other facility with appropriate resources: Identify barriers to discharge with patient and caregiver     Problem: Safety - Adult  Goal: Free from fall injury  Outcome: Progressing     Problem: Pain  Goal: Verbalizes/displays adequate comfort level or baseline comfort level  Outcome: Progressing     Problem: Skin/Tissue Integrity  Goal: Absence of new skin breakdown  Description: 1.  Monitor for areas of redness and/or skin breakdown  2.  Assess vascular access sites hourly  3.  Every 4-6 hours minimum:  Change oxygen saturation probe site  4.  Every 4-6 hours:  If on nasal continuous positive airway pressure, respiratory therapy assess nares and determine need for appliance change or resting period.  Outcome: Progressing

## 2024-01-20 NOTE — DEATH NOTES
Death Pronouncement Note  Patient's Name: Christiano Miller   Patient's YOB: 1945  MRN Number: [id number]    Admitting Provider: STEPHANIE Ha MD  Attending Provider: STEPHANIE Ha MD    Patient was examined and the following were absent: Pulses, Blood Pressure, and Respiratory effort    I declared the patient dead on  1/20/2024 at 04:05 AM     Preliminary Cause of Death:   Cardiopulmonary collapse      Electronically signed by Vidhya Hummel MD on 1/20/24 at 4:26 AM EST

## 2024-01-20 NOTE — CODE DOCUMENTATION
Hansel BOLDEN notified Dr. Ha of cardiac arrest. Multiple attempts to call family with no answer. l

## 2024-01-20 NOTE — H&P
Christiano Miller    : 1945  [facility] #: [id number]  MRN: [id number]              History and physical    Date of face-to-face exam: 2024.  Time of face-to-face exam: 1645.    Admitting diagnosis: Brain metastasis (IGC 2.1)    Comorbid diagnoses impacting rehabilitation: Malignant melanoma, generalized weakness, gait disturbance, esophagopharyngeal dysphagia, end-stage renal disease on hemodialysis, acute cystitis without hematuria, BPH with urinary hesitancy    Chief complaint: Abdominal distention and nasal congestion.    History of present illness: The patient is a 78-year-old right-hand-dominant male with known metastatic malignant melanoma involving his lungs, his sigmoid colon and his brain.  He presented to our ED on 2024 after a fall at home.  His evaluation identified multiple hemorrhagic metastases in the cerebral cortex.  The next day he underwent a craniotomy by Dr. Neely.  Patient has had slurred speech, generalized weakness and dysphagia.  Intravenous corticosteroids (Decadron) has been used to help modify his symptoms.  A modified diet with thickened liquids has been provided.  He continued to require hemodialysis due to his end-stage renal disease.  He has required significant assistance with bed mobility, transfers and toileting and cannot return directly home.  He requires inpatient rehabilitation to address these issues.    Review of systems: Patient has had significant weakness impacting his ability to transfer and walk.  No recent bowel movement of significance.  Some flatus reported today.  He complains of some abdominal distention with gas-like pain radiating towards his right shoulder.  Nasal congestion reported.  No significant headache, cough or limb paresthesias.  The remainder of their review of systems was negative except as mentioned in the history of present illness.    Social History: Lives With: Alone  Type of Home: Apartment  Home Layout: One level  Bathroom  Therefore I will provide GI prophylaxis with a PPI (Protonix) while monitoring his hemoglobin and platelet count regularly.  Weightbearing activities will be pursued daily.  Dysphagia: He needs to be upright for meds and meals.  He is on modified textures of solids and honey thick liquids.  Close supervision of his intake.  He may require periodic intravenous fluids to maintain adequate hydration.  This will be coordinated with nephrology due to his dialysis dependence.  His rhonchi on pulmonary exam will be evaluated with a stat chest x-ray.  Urinary tract infection with ESBL E. coli: Continuing his meropenem.  Encouraging consistent oral hydration with the thickened liquids.  Monitoring his white blood count and fever curve.  End-stage renal disease on hemodialysis: Nephrology has been consulted to continue to direct his RRT.  Periodic monitoring of his chemistries.  BPH: Minimizing anticholinergic exposures.  A bladder protocol will be useful should he communicate any symptoms of retention.  Obstipation: His mild abdominal distention with gas pains with diminished bowel sounds will be evaluated by stat portable abdominal x-ray.  A suppository has been made available for nursing to use tonight.  Continuing the MiraLAX order.  Hypertension: Continue Coreg for control of his systolic blood pressure.  Target systolic blood pressure is 120-140.  Vital signs will be checked at rest and with activity.    I personally performed a history and physical on this patient within 24 hours of admission to the rehab unit. I have reviewed the preadmission screening and concur with its findings without change. A detailed plan of care will be established by hospital day 4 and I attest the patient is appropriate for inpatient rehabilitation at this time.  I have compared the patient's current functional status noted during my history and physical with that of the preadmission screen and I have found no significant

## 2024-01-20 NOTE — PROCEDURES
Critical Care Intubation Note   Pre-Intubation     Time Code Paged: [id number]    Time of Arrival: 5925     Reason for consultation: Acute respiratory failure     Interventions prior to Anesthesiologist's arrival: IV access, AmbuBag     Procedure: Endotracheal intubation   [X] Airway equipment was checked.   [X] Suction available.   [X] Monitors including pulse oximetry, NIBP, and ECG monitoring in place or applied.   [X] The patient was preoxygenated.     Ventilation   Ventilation: Easy   Cricoid Pressure: Yes   Rapid sequence induction: Yes   Cervical collar present: No   In line stabilization or cervical collar left in place: No     Induction   none    Endotracheal Intubation   Intubation: was successful on 1 attempt.   Route: Oral   Blade: Mac 3   Adjuncts used: None   View of cords: Grade 1 view   ETT Size: 8.0 cuffed   Depth: 24 cm at the teeth   Confirmation: Colormetric change on ETCO2 detector x6, quantitative ETCO2   Secured with: ETT Rolon   Complications: None   Intubation performed by: Vidhya Hummel MD, Critical Care Attending

## 2024-01-20 NOTE — CODE DOCUMENTATION
Code Blue: CPR Note  At approximately 0344, Code blue was called overhead. Arrived to room with ongoing CPR, with initial rhythm noted to be asystole. Patient received CPR per ACLs protocol for asystole with 5 round of epinephrine. During every pulse check patient was noted to have a rhythm of asystole.     Despite all H&Ts being addressed, rhythm remained Asystole throughout.     Code was terminated at 04:05. Patient was pronounced, family was informed and condolences were provided    Per discussion with the nurse patient was last known well at around 2:30 am with the last vitals check. Review of all labs from today were within normal range for his comorbidities. He is known ESRD on dialysis and recently had a brain tumor excision for metastatic melanoma.     See CPR-Code Blue Sheet in bedside chart until scanned into EMR for medication dosings and resuscitation team members present.

## 2024-01-20 NOTE — PROGRESS NOTES
Called to room 1011, stating that patient seemed not breathing but with faint pulses. Patient was a full code. CPR was initiated. Code team were here.     Death was pronounced by Dr. Vidhya Hummel around 0405.    The family was called during the code and after the patient was pronounced dead. Several family members came in was given emotional support and offered ample time to say their last goodbyes.      services was also offered but was refused.      Belongings checked off on belongings list and signed by Barbara Martinez (daughter).     The folowing were notified of the patient's death-,  and Security.  Nursing Supervisor was present during code blue, [facility] was notified with referral# [id number].    Post mortem care was rendered with dignity which was done by TABITHA Quan and Camilla.      Barbara Juan and Wife gave gave the hospital authority to release the body to [facility] .      The family expressed gratefulness to everybody in ARU and doctors for the help and excellent care this patient received.     [facility], Nursing Supervisor Krystle Baer  was informed.

## 2024-01-22 NOTE — PROGRESS NOTES
ARU Admission Assessment - [facility]      A Complete drug regimen review was completed for this patient this date.   [x]  No clinically significant medication issue was identified   []  Yes, a clinically significant medication issue was identified     []  Adverse Drug Event:    []  Allergy:    []  Side Effect:    []  Ineffective Therapy:    []  Drug interaction:     []  Duplicate Therapy:    []  Untreated Indication:    []  Non-adherence:    []  Other:  Nursing contacted the physician:       Date:                Time:    Actions recommended by physician were completed:   Date:                 Time:  Action(s) Taken:             []  New Physician Order Received    []  Issue Noted by Physician; However No Action Required    []  Other:        Ethnicity  \"Are you of , /a, or Cymraes origin?\"  Check all that apply:  [x] A.  No, not of , /a, or Cymraes Origin  [] B.  Yes, Grenadian, Grenadian American, Chicano/a  [] C.  Yes, Turks and Ca[place] Islander  [] D.  Yes, Demetrio  [] E.  Yes, another , , or Cymraes origin  [] X.  Patient unable to respond    Race  \"What is your race?\"  Check all that apply:  [x] A.  White  [] B.  Black or   [] C.   or   [] D.   Saudi Arabian  [] E.  Chinese  [] F.  Saudi Arabian  [] G. English  [] H.  Portuguese  [] I.  Thai  [] J.  Other   [] K.    [] L.  Liberian or Roma  [] M.  Cypriot  [] N.  Other   [] X.  Patient unable to respond    Language  A.  \"What is your preferred language?\"   english    B.  \"Do you need or want an  to communicate with a doctor or health care staff?\"  Check only one:  [x] 0.  No  [] 1.  Yes  [] 9.  Unable to determine    Transportation  \"In the past 6-12 months, has lack of transportation kept you from medical appointments, meetings, work, or from getting things needed for daily living?\"  Check all that apply:  [] A.  Yes, it has kept me from

## 2024-01-25 NOTE — DEATH NOTES
Patient Name: Christiano Miller  Patient :  1945  Patient MRN:   [id number]      Admission Date:  2024  Discharge Date: 2024    Admitting diagnosis: Brain metastasis (IGC 2.1)    Comorbid diagnoses impacting rehabilitation: Malignant melanoma, generalized weakness, gait disturbance, esophagopharyngeal dysphagia, end-stage renal disease on hemodialysis, acute cystitis without hematuria, BPH with urinary hesitancy    Discharging diagnosis: Brain metastasis (IGC 2.1)    Comorbid diagnoses impacting rehabilitation: Malignant melanoma, generalized weakness, gait disturbance, esophagopharyngeal dysphagia, end-stage renal disease on hemodialysis, acute cystitis without hematuria, BPH with urinary hesitancy    Cause of death: Cardiac arrest.    History of present illness: The patient is a 78-year-old right-hand-dominant male with known metastatic malignant melanoma involving his lungs, his sigmoid colon and his brain.  He presented to our ED on 2024 after a fall at home.  His evaluation identified multiple hemorrhagic metastases in the cerebral cortex.  The next day he underwent a craniotomy by Dr. Neely.  Patient has had slurred speech, generalized weakness and dysphagia.  Intravenous corticosteroids (Decadron) has been used to help modify his symptoms.  A modified diet with thickened liquids has been provided.  He continued to require hemodialysis due to his end-stage renal disease.      Patient was admitted to the inpatient rehab unit at [facility].  On admission he describes some symptoms of constipation and abdominal gas.  An abdominal x-ray showed no obstructive pattern and bowel intervention was escalated.  He had an uneventful evening with normal vital signs.  Unfortunately, during a routine early morning check by nursing he was found to be in bed not breathing.  He was asystolic.  CODE BLUE was called.  The hospitalist that responded pursued exhaustive resuscitative effort.  These efforts were

## (undated) DEVICE — Device

## (undated) DEVICE — STERILE COTTON BALLS LARGE 5/P: Brand: MEDLINE

## (undated) DEVICE — GOWN,ECLIPSE,POLYRNF,BRTHSLV,L,30/CS: Brand: MEDLINE

## (undated) DEVICE — TUBING, SUCTION, 9/32" X 10', STRAIGHT: Brand: MEDLINE

## (undated) DEVICE — SHEET, T, LAPAROTOMY, STERILE: Brand: MEDLINE

## (undated) DEVICE — NEEDLE SPNL 20GA L3.5IN YEL HUB S STL REG WALL FIT STYL W/

## (undated) DEVICE — GLOVE SURG SZ 8 L12IN THK75MIL DK GRN LTX FREE

## (undated) DEVICE — YANKAUER,FLEXIBLE HANDLE,REGLR CAPACITY: Brand: MEDLINE INDUSTRIES, INC.

## (undated) DEVICE — SUTURE ABSORBABLE BRAIDED 2-0 CT-1 27 IN UD VICRYL J259H

## (undated) DEVICE — SYRINGE MED 10ML TRNSLUC BRL PLUNG BLK MRK POLYPR CTRL

## (undated) DEVICE — AGENT HEMSTAT W2XL3IN OXIDIZED REGENERATED CELOS ABSRB

## (undated) DEVICE — COUNTER NDL 30 COUNT FOAM STRP SGL MAG

## (undated) DEVICE — DRESSING TRNSPAR W2XL2.75IN FLM SHT SEMIPERMEABLE WIND

## (undated) DEVICE — MARKER SURG SKIN UTIL REGULAR/FINE 2 TIP W/ RUL AND 9 LBL

## (undated) DEVICE — PACK,BASIC,SIRUS,V: Brand: MEDLINE

## (undated) DEVICE — PACK,BASIC,IX: Brand: MEDLINE

## (undated) DEVICE — NEEDLE HYPO 23GA L1.5IN TURQ POLYPR HUB S STL THN WALL IM

## (undated) DEVICE — COVER US PRB W12XL244CM SURGICAL INTRAOPERATIVE PLAS TAPR L

## (undated) DEVICE — INTENDED FOR TISSUE SEPARATION, AND OTHER PROCEDURES THAT REQUIRE A SHARP SURGICAL BLADE TO PUNCTURE OR CUT.: Brand: BARD-PARKER ® STAINLESS STEEL BLADES

## (undated) DEVICE — NEEDLE HYPO 25GA L1.5IN BLU POLYPR HUB S STL REG BVL STR

## (undated) DEVICE — SUTURE ABSRB L18IN SZ 40 UD 13MM PC1 3/8 CIR PRECIS COSM VR835

## (undated) DEVICE — PENCIL ES CRD L10FT HND SWCHING ROCK SWCH W/ EDGE COAT BLDE

## (undated) DEVICE — SPONGE LAP W18XL18IN WHT COT 4 PLY FLD STRUNG RADPQ DISP ST 2 PER PACK

## (undated) DEVICE — APPLICATOR BNDG 1MM ADH PREMIERPRO EXOFIN

## (undated) DEVICE — UNDERGLOVE SURG SZ 8.5 FNGR THK0.21MIL GRN LTX BEAD CUF

## (undated) DEVICE — BATTERY SURG DRVR DISP FOR MATRIXPRO

## (undated) DEVICE — CONTAINER,SPECIMEN,OR STERILE,4OZ: Brand: MEDLINE

## (undated) DEVICE — HOOK RETRCT L5MM E SHRP SELF RET SYS LONE STAR

## (undated) DEVICE — CRANIOTOMY DRAPE, STERILE: Brand: MEDLINE

## (undated) DEVICE — WAX SURG 2.5GM HEMSTAT BNE BEESWAX PARAFFIN ISO PALMITATE

## (undated) DEVICE — GLOVE SURG SZ 85 L1185IN FNGR THK75MIL STRW LTX POLYMER

## (undated) DEVICE — SUTURE NRLN SZ 4-0 L18IN NONABSORBABLE BLK L13MM TF 1/2 CIR C584D

## (undated) DEVICE — DRAPE SHEET ULTRAGARD: Brand: MEDLINE

## (undated) DEVICE — SUTURE VCRL SZ 3-0 L27IN ABSRB UD L26MM SH 1/2 CIR J416H

## (undated) DEVICE — 1016 S-DRAPE IRRIG POUCH 10/BOX: Brand: STERI-DRAPE™

## (undated) DEVICE — COVER,MAYO STAND,STERILE: Brand: MEDLINE

## (undated) DEVICE — APPLICATOR MEDICATED 26 CC SOLUTION HI LT ORNG CHLORAPREP

## (undated) DEVICE — STRIP SKIN CLSR W1XL5IN NYL REINF CURAD

## (undated) DEVICE — SPONGE,NEURO,0.5"X0.5",XR,STRL,10/PK: Brand: MEDLINE

## (undated) DEVICE — MARKER SURG SKIN UTIL BLK REG TIP NONSMEARING W/ 6IN RUL

## (undated) DEVICE — CLIP SURG NYL DISP RANEY

## (undated) DEVICE — GOWN,SIRUS,POLYRNF,BRTHSLV,XLN/XL,20/CS: Brand: MEDLINE

## (undated) DEVICE — GLOVE SURG SZ 65 CRM LTX FREE POLYISOPRENE POLYMER BEAD ANTI

## (undated) DEVICE — CONVERTORS STOCKINETTE: Brand: CONVERTORS

## (undated) DEVICE — PIN ADLT MAYFIELD RIGID MOLD FINGER

## (undated) DEVICE — TOWEL,OR,DSP,ST,BLUE,STD,6/PK,12PK/CS: Brand: MEDLINE

## (undated) DEVICE — 2.3MM TAPERED ROUTER

## (undated) DEVICE — SUTURE VCRL SZ 2-0 L18IN ABSRB UD CT-1 L36MM 1/2 CIR J839D

## (undated) DEVICE — Z DISCONTINUED NO SUB IDED TUBING ETCO2 AD L6.5FT NSL ORAL CVD PRNG NONFLARED TIP OVR

## (undated) DEVICE — GLOVE SURG SZ 65 L12IN FNGR THK79MIL GRN LTX FREE

## (undated) DEVICE — SHEET,DRAPE,53X77,STERILE: Brand: MEDLINE

## (undated) DEVICE — ZYPHR DISPOSABLE CRANIAL PERFORATOR, LARGE 14/11MM: Brand: ZYPHR

## (undated) DEVICE — DRESSING TRNSPAR W5XL4.5IN FLM SHT SEMIPERMEABLE WIND

## (undated) DEVICE — DRAPE,UTILITY,XL,4/PK,STERILE: Brand: MEDLINE

## (undated) DEVICE — SOLUTION IV IRRIG 1000ML POUR BTL 2F7114

## (undated) DEVICE — PROTECTOR EYE PT SELF ADH NS OPT GRD LF

## (undated) DEVICE — SUTURE VCRL SZ 3-0 L27IN ABSRB UD L17MM RB-1 1/2 CIR J215H

## (undated) DEVICE — SPONGE LAP W18XL18IN WHT COT 4 PLY FLD STRUNG RADPQ DISP ST

## (undated) DEVICE — GLOVE ORANGE PI 8 1/2   MSG9085

## (undated) DEVICE — CONMED ACCESSORY ELECTRODE, NEEDLE WITH EXTENDED INSULATION: Brand: CONMED

## (undated) DEVICE — SUTURE VCRL SZ 3-0 L54IN ABSRB UD LIGAPAK REEL POLYGLACTIN J285G

## (undated) DEVICE — TOTAL TRAY, DB, 100% SILI FOLEY, 16FR 10: Brand: MEDLINE

## (undated) DEVICE — ELECTRODE ES L2.75IN S STL INSUL BLDE W/ SL EDGE

## (undated) DEVICE — GLOVE SURG SZ 7 L12IN FNGR THK79MIL GRN LTX FREE

## (undated) DEVICE — SYRINGE IRRIG 60ML SFT PLIABLE BLB EZ TO GRP 1 HND USE W/

## (undated) DEVICE — SUTURE VCRL SZ 3-0 L18IN ABSRB UD W/O NDL POLYGLACTIN 910 J110T

## (undated) DEVICE — AGENT HEMOSTATIC SURGIFLOW MATRIX KIT W/THROMBIN

## (undated) DEVICE — 1010 S-DRAPE TOWEL DRAPE 10/BX: Brand: STERI-DRAPE™

## (undated) DEVICE — SHEET,DRAPE,40X58,STERILE: Brand: MEDLINE

## (undated) DEVICE — SYRINGE MED 10ML LUERLOCK TIP W/O SFTY DISP

## (undated) DEVICE — GLOVE SURG SZ 6 CRM LTX FREE POLYISOPRENE POLYMER BEAD ANTI

## (undated) DEVICE — GLOVE SURG SZ 65 THK91MIL LTX FREE SYN POLYISOPRENE

## (undated) DEVICE — ADHESIVE SKIN CLSR 0.7ML TOP DERMBND ADV

## (undated) DEVICE — SOLUTION SURG PREP ANTIMICROBIAL 4 OZ SKIN WND EXIDINE

## (undated) DEVICE — STAPLER SKIN H3.9MM WIRE DIA0.58MM CRWN 6.9MM 35 STPL FIX

## (undated) DEVICE — ELECTRODE ES AD CRDLSS PT RET REM POLYHESIVE

## (undated) DEVICE — DRESSING ADH N ADH 6X3.5 IN PD FLM TEGDERM

## (undated) DEVICE — GOWN,ECLIPSE,POLYRNF,BRTHSLV,XL,30/CS: Brand: MEDLINE

## (undated) DEVICE — BIPOLAR IRRIGATOR INTEGRATED TUBING AND BIPOLAR CORD SET, DISPOSABLE, UNITIZED PLUG

## (undated) DEVICE — STERILE LATEX POWDER-FREE SURGICAL GLOVESWITH NITRILE COATING: Brand: PROTEXIS

## (undated) DEVICE — POSITIONER HD AD W4.5XH8XL9IN HIGHLY RESILIENT FOAM CMFRT

## (undated) DEVICE — BLADE CLIPPER GEN PURP NS

## (undated) DEVICE — GAUZE,SPONGE,4"X4",16PLY,XRAY,STRL,LF: Brand: MEDLINE

## (undated) DEVICE — SYRINGE MED 30ML STD CLR PLAS LUERLOCK TIP N CTRL DISP

## (undated) DEVICE — GOWN,SURGICAL,AURORA,SLEEVE: Brand: MEDLINE

## (undated) DEVICE — SOLUTION IV IRRIG POUR BRL 0.9% SODIUM CHL 2F7124

## (undated) DEVICE — 3M™ STERI-STRIP™ COMPOUND BENZOIN TINCTURE 40 BAGS/CARTON 4 CARTONS/CASE C1544: Brand: 3M™ STERI-STRIP™

## (undated) DEVICE — GLOVE SURG SZ 6 L12IN FNGR THK75MIL WHT LTX POLYMER BEAD

## (undated) DEVICE — SYRINGE, LUER LOCK, 10ML: Brand: MEDLINE

## (undated) DEVICE — SUTURE VCRL SZ 2-0 L18IN ABSRB VLT L26MM SH 1/2 CIR J775D

## (undated) DEVICE — SUTURE MCRYL SZ 4-0 L18IN ABSRB UD L19MM PS-2 3/8 CIR PRIM Y496G

## (undated) DEVICE — 3M™ STERI-DRAPE™ INSTRUMENT POUCH 1018: Brand: STERI-DRAPE™

## (undated) DEVICE — STAPLER SKIN H3.9MM WIRE DIA0.58MM CRWN 6.9MM 35 STPL ROT